# Patient Record
Sex: FEMALE | Race: WHITE | NOT HISPANIC OR LATINO | Employment: OTHER | URBAN - METROPOLITAN AREA
[De-identification: names, ages, dates, MRNs, and addresses within clinical notes are randomized per-mention and may not be internally consistent; named-entity substitution may affect disease eponyms.]

---

## 2017-04-25 ENCOUNTER — TRANSFERRED RECORDS (OUTPATIENT)
Dept: FAMILY MEDICINE | Facility: CLINIC | Age: 77
End: 2017-04-25

## 2017-06-21 ENCOUNTER — OFFICE VISIT (OUTPATIENT)
Dept: FAMILY MEDICINE | Facility: CLINIC | Age: 77
End: 2017-06-21

## 2017-06-21 VITALS
BODY MASS INDEX: 31.74 KG/M2 | RESPIRATION RATE: 20 BRPM | HEIGHT: 58 IN | WEIGHT: 151.2 LBS | TEMPERATURE: 97.6 F | HEART RATE: 60 BPM | SYSTOLIC BLOOD PRESSURE: 148 MMHG | DIASTOLIC BLOOD PRESSURE: 92 MMHG

## 2017-06-21 DIAGNOSIS — I10 ESSENTIAL HYPERTENSION, BENIGN: ICD-10-CM

## 2017-06-21 DIAGNOSIS — E78.2 MIXED HYPERLIPIDEMIA: ICD-10-CM

## 2017-06-21 DIAGNOSIS — R73.03 PRE-DIABETES: ICD-10-CM

## 2017-06-21 DIAGNOSIS — Z79.899 ENCOUNTER FOR LONG-TERM (CURRENT) USE OF MEDICATIONS: Primary | ICD-10-CM

## 2017-06-21 DIAGNOSIS — I49.1 PREMATURE ATRIAL BEATS: ICD-10-CM

## 2017-06-21 LAB — HBA1C MFR BLD: 6.7 % (ref 4–7)

## 2017-06-21 PROCEDURE — 99213 OFFICE O/P EST LOW 20 MIN: CPT | Performed by: PHYSICIAN ASSISTANT

## 2017-06-21 PROCEDURE — 83036 HEMOGLOBIN GLYCOSYLATED A1C: CPT | Performed by: PHYSICIAN ASSISTANT

## 2017-06-21 PROCEDURE — 36415 COLL VENOUS BLD VENIPUNCTURE: CPT | Performed by: PHYSICIAN ASSISTANT

## 2017-06-21 RX ORDER — ENALAPRIL MALEATE 10 MG/1
10 TABLET ORAL DAILY
Qty: 90 TABLET | Refills: 3 | Status: SHIPPED | OUTPATIENT
Start: 2017-06-21 | End: 2018-06-21

## 2017-06-21 RX ORDER — METOPROLOL TARTRATE 25 MG/1
12 TABLET, FILM COATED ORAL 2 TIMES DAILY
Qty: 90 TABLET | Refills: 3 | Status: SHIPPED | OUTPATIENT
Start: 2017-06-21 | End: 2018-06-21

## 2017-06-21 RX ORDER — HYDROCHLOROTHIAZIDE 25 MG/1
12.5 TABLET ORAL DAILY
Qty: 45 TABLET | Refills: 3 | Status: SHIPPED | OUTPATIENT
Start: 2017-06-21 | End: 2018-06-21

## 2017-06-21 NOTE — NURSING NOTE
Mikayla Sotomayor is here for a blood pressure check and medication refill.  Questioned patient about current smoking habits.  Pt. has never smoked.  Body mass index is 33.67 kg/(m^2).  PULSE regular  My Chart:     Pre-visit planning  Immunizations - up to date  Colonoscopy - declines  Mammogram - is up to date  Asthma -   PHQ9 -    JACKIE-7 -

## 2017-06-21 NOTE — PROGRESS NOTES
"CC: Medication check    History:  BENIGN HYPERTENSION  Mikayla takes BP 4-5 times/week at home. Getting 120s/60-70s.  As high as 130s, but never 140s.   Takes enalapril and HCTZ for her blood pressure. Has stopped walking as it becomes pain. No side effects to medication.     No chest pain, palpitations, shortness of breath. Occasional swelling in legs at night, but resolves by morning.      Is extremely careful with salt, and has a good balanced diet.       Premature atrial beats  Takes metoprolol for premature atrial beats. Is not having any symptoms of the premature beats like she used to.    Prediabetes  A1c last checked 4/2016 and was 6.1%. Not taking any medications.    PMH, MEDICATIONS, ALLERGIES, SOCIAL AND FAMILY HISTORY in [facility] and reviewed by me personally.      ROS negative other than the symptoms noted above in the HPI.        Examination   BP (!) 148/92 (BP Location: Left arm, Patient Position: Chair, Cuff Size: Adult Regular)  Pulse 60  Temp 97.6  F (36.4  C) (Oral)  Resp 20  Ht 1.48 m (4' 10.25\")  Wt 68.6 kg (151 lb 3.2 oz)  BMI 31.33 kg/m2       Constitutional: Sitting comfortably, in no acute distress. Vital signs noted. BP mildly elevated.   Eyes: pupils equal round reactive to light and accomodation, extra ocular movements intact  Cardiovascular:  regular rate and rhythm, no murmurs, clicks, or gallops  Respiratory:  normal respiratory rate and rhythm, lungs clear to auscultation  SKIN: No jaundice/pallor/rash.   Psychiatric: mentation appears normal and affect normal/bright        A/P    ICD-10-CM    1. Encounter for long-term (current) use of medications Z79.899    2. BENIGN HYPERTENSION I10 hydrochlorothiazide (HYDRODIURIL) 25 MG tablet     enalapril (VASOTEC) 10 MG tablet     BASIC METABOLIC PANEL (QUEST)   3. Premature atrial beats I49.1 metoprolol (LOPRESSOR) 25 MG tablet     VENOUS COLLECTION   4. Mixed hyperlipidemia E78.2 VENOUS COLLECTION     Lipid Profile (QUEST)   5. Pre-diabetes " R73.03 Hemoglobin A1c (BFP)       DISCUSSION: Mikayla seems to have white coat hypertension as all her home readings are much lower, she will bring cuff to next appt. Will refill medications and check labs today. I will send her a letter with results and any further recommendations.     follow up visit: 1 year, unless lab results indicate sooner     Lizbeth To PA-C  [facility]

## 2017-06-21 NOTE — MR AVS SNAPSHOT
"              After Visit Summary   6/21/2017    Mikayla Sotomayor    MRN: [id number]           Patient Information     Date Of Birth          1940        Visit Information        Provider Department      6/21/2017 10:00 AM Lizbeth To PA-C [facility]        Today's Diagnoses     Encounter for long-term (current) use of medications    -  1    BENIGN HYPERTENSION        Premature atrial beats        Mixed hyperlipidemia        Pre-diabetes           Follow-ups after your visit        Follow-up notes from your care team     Return in about 1 year (around 6/21/2018) for BP Recheck, Lab Work, Routine Visit.      Who to contact     If you have questions or need follow up information about today's clinic visit or your schedule please contact [facility] directly at [phone].  Normal or non-critical lab and imaging results will be communicated to you by Teevoxhart, letter or phone within 4 business days after the clinic has received the results. If you do not hear from us within 7 days, please contact the clinic through Teevoxhart or phone. If you have a critical or abnormal lab result, we will notify you by phone as soon as possible.  Submit refill requests through AppCast or call your pharmacy and they will forward the refill request to us. Please allow 3 business days for your refill to be completed.          Additional Information About Your Visit        Teevoxhart Information     AppCast lets you send messages to your doctor, view your test results, renew your prescriptions, schedule appointments and more. To sign up, go to [email or web address] . Click on \"Log in\" on the left side of the screen, which will take you to the Welcome page. Then click on \"Sign up Now\" on the right side of the page.     You will be asked to enter the access code listed below, as well as some personal information. Please follow the directions to create your username and " "password.     Your access code is: [email or web address]  Expires: 2017  1:25 PM     Your access code will  in 90 days. If you need help or a new code, please call your [facility] clinic or [phone].        Care EveryWhere ID     This is your Care EveryWhere ID. This could be used by other organizations to access your [facility] medical records  [id number]        Your Vitals Were     Pulse Temperature Respirations Height BMI (Body Mass Index)       60 97.6  F (36.4  C) (Oral) 20 1.48 m (4' 10.25\") 31.33 kg/m2        Blood Pressure from Last 3 Encounters:   17 (!) 148/92   16 150/70   16 156/78    Weight from Last 3 Encounters:   17 68.6 kg (151 lb 3.2 oz)   16 67.7 kg (149 lb 3.2 oz)   16 65.8 kg (145 lb)              We Performed the Following     BASIC METABOLIC PANEL (QUEST)     Hemoglobin A1c (BFP)     Lipid Profile (QUEST)     VENOUS COLLECTION          Where to get your medicines      These medications were sent to [facility] [address] [place] [address] [address]  AT [address] [facility] [address]     Phone:  [phone]     enalapril 10 MG tablet    hydrochlorothiazide 25 MG tablet    metoprolol 25 MG tablet          Primary Care Provider Office Phone # Fax #    Lizbeth To PA-C [phone]       [facility] [address] [facility] [address]  [facility] [address]        Equal Access to Services     LARA MARIE AH: Hadii yair tongo Sokeeley, waaxda luqadaha, qaybta kaalmada adeemilyyada, sary escobar. So [facility] [phone].    ATENCIÓN: Si habla español, tiene a koenig disposición servicios gratuitos de asistencia lingüística. Llame al [phone].    We comply with applicable federal civil rights laws and [place] laws. We do not discriminate on the basis of race, color, national origin, age, disability sex, sexual orientation or gender identity.       "      Thank you!     Thank you for choosing [facility]  for your care. Our goal is always to provide you with excellent care. Hearing back from our patients is one way we can continue to improve our services. Please take a few minutes to complete the written survey that you may receive in the mail after your visit with us. Thank you!             Your Updated Medication List - Protect others around you: Learn how to safely use, store and throw away your medicines at www.disposemymeds.org.          This list is accurate as of: 6/21/17  1:25 PM.  Always use your most recent med list.                   Brand Name Dispense Instructions for use Diagnosis    aspirin 81 MG tablet     100    1 tab po QD (Once per day)        CALCIUM MAGNESIUM PO      Take 2 oz by mouth daily.        enalapril 10 MG tablet    VASOTEC    90 tablet    Take 1 tablet (10 mg) by mouth daily    Essential hypertension, benign       FLAXSEED (LINSEED) PO      Take 1 tablet by mouth 2 times daily.        hydrochlorothiazide 25 MG tablet    HYDRODIURIL    45 tablet    Take 0.5 tablets (12.5 mg) by mouth daily    Essential hypertension, benign       metoprolol 25 MG tablet    LOPRESSOR    90 tablet    Take 0.5 tablets (12.5 mg) by mouth 2 times daily    Premature atrial beats       MULTIVITAMIN & MINERAL Liqd      Take 2 oz by mouth daily.        VITAMIN D (CHOLECALCIFEROL) PO      Take 5 drops by mouth daily.

## 2017-06-22 LAB
BUN SERPL-MCNC: 21 MG/DL (ref 7–25)
BUN/CREATININE RATIO: 17 (CALC) (ref 6–22)
CALCIUM SERPL-MCNC: 9.9 MG/DL (ref 8.6–10.4)
CHLORIDE SERPLBLD-SCNC: 100 MMOL/L (ref 98–110)
CHOLEST SERPL-MCNC: 246 MG/DL (ref 125–200)
CHOLEST/HDLC SERPL: 4.4 (CALC)
CO2 SERPL-SCNC: 24 MMOL/L (ref 20–31)
CREAT SERPL-MCNC: 1.24 MG/DL (ref 0.6–0.93)
EGFR AFRICAN AMERICAN - QUEST: 48 ML/MIN/1.73M2
GFR SERPL CREATININE-BSD FRML MDRD: 42 ML/MIN/1.73M2
GLUCOSE - QUEST: 127 MG/DL (ref 65–99)
HDLC SERPL-MCNC: 56 MG/DL
LDLC SERPL CALC-MCNC: 159 MG/DL (CALC)
NONHDLC SERPL-MCNC: 190 MG/DL (CALC)
POTASSIUM SERPL-SCNC: 4.6 MMOL/L (ref 3.5–5.3)
SODIUM SERPL-SCNC: 138 MMOL/L (ref 135–146)
TRIGL SERPL-MCNC: 155 MG/DL

## 2017-06-23 PROBLEM — E11.9 DIABETES MELLITUS, TYPE 2 (H): Status: ACTIVE | Noted: 2017-06-23

## 2017-06-23 PROBLEM — R73.03 PRE-DIABETES: Status: RESOLVED | Noted: 2017-06-21 | Resolved: 2017-06-23

## 2018-06-21 ENCOUNTER — OFFICE VISIT (OUTPATIENT)
Dept: FAMILY MEDICINE | Facility: CLINIC | Age: 78
End: 2018-06-21

## 2018-06-21 VITALS
DIASTOLIC BLOOD PRESSURE: 73 MMHG | RESPIRATION RATE: 20 BRPM | HEIGHT: 58 IN | TEMPERATURE: 97.7 F | HEART RATE: 57 BPM | BODY MASS INDEX: 31.91 KG/M2 | WEIGHT: 152 LBS | SYSTOLIC BLOOD PRESSURE: 184 MMHG

## 2018-06-21 DIAGNOSIS — G62.89 OTHER POLYNEUROPATHY: ICD-10-CM

## 2018-06-21 DIAGNOSIS — I34.0 MITRAL VALVE INSUFFICIENCY, UNSPECIFIED ETIOLOGY: ICD-10-CM

## 2018-06-21 DIAGNOSIS — E11.9 TYPE 2 DIABETES MELLITUS WITHOUT COMPLICATION, WITHOUT LONG-TERM CURRENT USE OF INSULIN (H): Primary | ICD-10-CM

## 2018-06-21 DIAGNOSIS — I49.1 PREMATURE ATRIAL BEATS: ICD-10-CM

## 2018-06-21 DIAGNOSIS — I10 ESSENTIAL HYPERTENSION, BENIGN: ICD-10-CM

## 2018-06-21 DIAGNOSIS — E78.2 MIXED HYPERLIPIDEMIA: ICD-10-CM

## 2018-06-21 LAB — HBA1C MFR BLD: 6.8 % (ref 4–7)

## 2018-06-21 PROCEDURE — 99213 OFFICE O/P EST LOW 20 MIN: CPT | Performed by: PHYSICIAN ASSISTANT

## 2018-06-21 PROCEDURE — 83036 HEMOGLOBIN GLYCOSYLATED A1C: CPT | Performed by: PHYSICIAN ASSISTANT

## 2018-06-21 PROCEDURE — 36415 COLL VENOUS BLD VENIPUNCTURE: CPT | Performed by: PHYSICIAN ASSISTANT

## 2018-06-21 RX ORDER — ENALAPRIL MALEATE 20 MG/1
20 TABLET ORAL DAILY
Qty: 90 TABLET | Refills: 0 | Status: SHIPPED | OUTPATIENT
Start: 2018-06-21 | End: 2018-10-01

## 2018-06-21 RX ORDER — ENALAPRIL MALEATE 10 MG/1
10 TABLET ORAL DAILY
Qty: 90 TABLET | Refills: 1 | Status: SHIPPED | OUTPATIENT
Start: 2018-06-21 | End: 2018-06-21 | Stop reason: DRUGHIGH

## 2018-06-21 RX ORDER — METOPROLOL TARTRATE 25 MG/1
12 TABLET, FILM COATED ORAL 2 TIMES DAILY
Qty: 90 TABLET | Refills: 1 | Status: SHIPPED | OUTPATIENT
Start: 2018-06-21 | End: 2018-10-01

## 2018-06-21 RX ORDER — HYDROCHLOROTHIAZIDE 25 MG/1
12.5 TABLET ORAL DAILY
Qty: 45 TABLET | Refills: 1 | Status: SHIPPED | OUTPATIENT
Start: 2018-06-21 | End: 2018-10-01

## 2018-06-21 NOTE — MR AVS SNAPSHOT
After Visit Summary   6/21/2018    Mikayla Sotomayor    MRN: [id number]           Patient Information     Date Of Birth          1940        Visit Information        Provider Department      6/21/2018 10:00 AM Lizbeth To PA-C [facility]        Today's Diagnoses     Type 2 diabetes mellitus without complication, without long-term current use of insulin (H)    -  1    BENIGN HYPERTENSION        Premature atrial beats        Other polyneuropathy        Mixed hyperlipidemia        Mitral valve insufficiency, unspecified etiology           Follow-ups after your visit        Follow-up notes from your care team     Return in about 3 months (around 9/21/2018).      Future tests that were ordered for you today     Open Future Orders        Priority Expected Expires Ordered    Echocardiogram Routine  6/21/2019 6/21/2018            Who to contact     If you have questions or need follow up information about today's clinic visit or your schedule please contact [facility] directly at [phone].  Normal or non-critical lab and imaging results will be communicated to you by MyChart, letter or phone within 4 business days after the clinic has received the results. If you do not hear from us within 7 days, please contact the clinic through Ocean Lithotripsyhart or phone. If you have a critical or abnormal lab result, we will notify you by phone as soon as possible.  Submit refill requests through iTherX or call your pharmacy and they will forward the refill request to us. Please allow 3 business days for your refill to be completed.          Additional Information About Your Visit        Care EveryWhere ID     This is your Care EveryWhere ID. This could be used by other organizations to access your [place] [facility] medical records  [id number]        Your Vitals Were     Pulse Temperature Respirations Height BMI (Body Mass Index)       57 97.7  F (36.5  C) (Oral) 20 1.48  "m (4' 10.25\") 31.5 kg/m2        Blood Pressure from Last 3 Encounters:   06/21/18 184/73   06/21/17 (!) 148/92   12/16/16 150/70    Weight from Last 3 Encounters:   06/21/18 68.9 kg (152 lb)   06/21/17 68.6 kg (151 lb 3.2 oz)   12/16/16 67.7 kg (149 lb 3.2 oz)              We Performed the Following     BASIC METABOLIC PANEL (QUEST)     Hemoglobin A1c (BFP)     Lipid Profile (QUEST)     VENOUS COLLECTION     VITAMIN B12 (QUEST)          Today's Medication Changes          These changes are accurate as of 6/21/18  4:02 PM.  If you have any questions, ask your nurse or doctor.               These medicines have changed or have updated prescriptions.        Dose/Directions    enalapril 20 MG tablet   Commonly known as:  VASOTEC   This may have changed:    - medication strength  - how much to take   Used for:  Essential hypertension, benign   Changed by:  Lizbeth To PA-C        Dose:  20 mg   Take 1 tablet (20 mg) by mouth daily   Quantity:  90 tablet   Refills:  0            Where to get your medicines      These medications [facility] to [facility] [address] [place] [address] [address] AT [address] [facility] [address]     Phone:  [phone]     enalapril 20 MG tablet    hydrochlorothiazide 25 MG tablet    metoprolol tartrate 25 MG tablet                Primary Care Provider Office Phone # Fax #    Lizbeth To PA-C [phone] [address] [address]        Equal Access to Services     [facility] AH: Hadii yair radford Sokeeley, waaxda luqadaha, qaybta kaalmada dana, sary escobar. So [facility] [phone].    ATENCIÓN: Si habla español, tiene a koenig disposición servicios gratuitos de asistencia lingüística. Inez al [phone].    We comply with applicable federal civil rights laws and [place] laws. We do not discriminate on the basis of race, color, " national origin, age, disability, sex, sexual orientation, or gender identity.            Thank you!     Thank you for choosing [facility]  for your care. Our goal is always to provide you with excellent care. Hearing back from our patients is one way we can continue to improve our services. Please take a few minutes to complete the written survey that you may receive in the mail after your visit with us. Thank you!             Your Updated Medication List - Protect others around you: Learn how to safely use, store and throw away your medicines at www.disposemymeds.org.          This list is accurate as of 6/21/18  4:02 PM.  Always use your most recent med list.                   Brand Name Dispense Instructions for use Diagnosis    aspirin 81 MG tablet     100    1 tab po QD (Once per day)        B-12 1000 MCG Tbcr     100 tablet    Take 1,000 mcg by mouth daily        CALCIUM MAGNESIUM PO      Take 2 oz by mouth daily.        enalapril 20 MG tablet    VASOTEC    90 tablet    Take 1 tablet (20 mg) by mouth daily    Essential hypertension, benign       FLAXSEED (LINSEED) PO      Take 1 tablet by mouth 2 times daily.        hydrochlorothiazide 25 MG tablet    HYDRODIURIL    45 tablet    Take 0.5 tablets (12.5 mg) by mouth daily    Essential hypertension, benign       metoprolol tartrate 25 MG tablet    LOPRESSOR    90 tablet    Take 0.5 tablets (12.5 mg) by mouth 2 times daily    Premature atrial beats       MULTIVITAMIN & MINERAL Liqd      Take 2 oz by mouth daily.        VITAMIN D (CHOLECALCIFEROL) PO      Take 5 drops by mouth daily.

## 2018-06-21 NOTE — PROGRESS NOTES
"CC: Medication Check    History:  Mikayla is here to refill her BP medication. She takes BP every day at home. Getting 120s/70s.  As high as 130s occasionally, but never 140s.      Takes metoprolol for premature atrial beats. Takes enalapril and HCTZ for her blood pressure.     No chest pain, palpitations, shortness of breath. Occasional swelling in legs at night, but resolves by morning.      Is extremely careful with salt, and has a good balanced diet.     PMH, MEDICATIONS, ALLERGIES, SOCIAL AND FAMILY HISTORY in [facility] and reviewed by me personally.      ROS negative other than the symptoms noted above in the HPI.        Examination   /73  Pulse 57  Temp 97.7  F (36.5  C) (Oral)  Resp 20  Ht 1.48 m (4' 10.25\")  Wt 68.9 kg (152 lb)  BMI 31.5 kg/m2       Constitutional: Sitting comfortably, in no acute distress. Vital signs noted  Eyes: pupils equal round reactive to light and accomodation, extra ocular movements intact  Neck:  no adenopathy, trachea midline and normal to palpation  Cardiovascular:  regular rate and rhythm, no murmurs, clicks, or gallops  Respiratory:  normal respiratory rate and rhythm, lungs clear to auscultation  SKIN: No jaundice/pallor/rash.   Psychiatric: mentation appears normal and affect normal/bright        A/P    ICD-10-CM    1. Type 2 diabetes mellitus without complication, without long-term current use of insulin (H) E11.9 VENOUS COLLECTION     Hemoglobin A1c (BFP)   2. BENIGN HYPERTENSION I10 hydrochlorothiazide (HYDRODIURIL) 25 MG tablet     enalapril (VASOTEC) 20 MG tablet     DISCONTINUED: enalapril (VASOTEC) 10 MG tablet   3. Premature atrial beats I49.1 metoprolol tartrate (LOPRESSOR) 25 MG tablet   4. Other polyneuropathy G62.89 BASIC METABOLIC PANEL (QUEST)     VITAMIN B12 (QUEST)   5. Mixed hyperlipidemia E78.2 Lipid Profile (QUEST)   6. Mitral valve insufficiency, unspecified etiology I34.0 Echocardiogram       DISCUSSION:  Mikayla's BP was elevated on 7 minute omron. " Recommended increase to 20 mg daily of enalapril with other medications staying the same. Did order echocardiogram to recheck MR she was last checked for in 2013. May need to follow-up with cardiology based on results.I will check other labs today, and contact her with letter when available.     A1c today was stable at 6.8% since last checked 6/2017 and was 6.7%. She can continue without medication for diabetes, but should continue to use caution with diet, and be as active as possible.     follow up visit: 1-2 months, to ensure lower BP.     Lizbeth To PA-C  [facility]

## 2018-06-21 NOTE — NURSING NOTE
Mikayla Sotomayor is here for a blood pressure check and medication refill.  Questioned patient about current smoking habits.  Pt. has never smoked.  Body mass index is 33.67 kg/(m^2).  PULSE regular  My Chart: declines    Pre-visit planning  Immunizations - up to date, declines Tdap  Colonoscopy -   Mammogram - is due and to be scheduled by patient for later completion  Asthma -   PHQ9 -    JACKIE-7 -

## 2018-06-22 LAB
BUN SERPL-MCNC: 25 MG/DL (ref 7–25)
BUN/CREATININE RATIO: 20 (CALC) (ref 6–22)
CALCIUM SERPL-MCNC: 9.6 MG/DL (ref 8.6–10.4)
CHLORIDE SERPLBLD-SCNC: 100 MMOL/L (ref 98–110)
CHOLEST SERPL-MCNC: 220 MG/DL
CHOLEST/HDLC SERPL: 4.7 (CALC)
CO2 SERPL-SCNC: 25 MMOL/L (ref 20–31)
CREAT SERPL-MCNC: 1.24 MG/DL (ref 0.6–0.93)
EGFR AFRICAN AMERICAN - QUEST: 48 ML/MIN/1.73M2
GFR SERPL CREATININE-BSD FRML MDRD: 41 ML/MIN/1.73M2
GLUCOSE - QUEST: 124 MG/DL (ref 65–99)
HDLC SERPL-MCNC: 47 MG/DL
LDLC SERPL CALC-MCNC: 143 MG/DL (CALC)
NONHDLC SERPL-MCNC: 173 MG/DL (CALC)
POTASSIUM SERPL-SCNC: 4.3 MMOL/L (ref 3.5–5.3)
SODIUM SERPL-SCNC: 137 MMOL/L (ref 135–146)
TRIGL SERPL-MCNC: 167 MG/DL
VIT B12 SERPL-MCNC: 233 PG/ML (ref 200–1100)

## 2018-06-29 ENCOUNTER — HEALTH MAINTENANCE LETTER (OUTPATIENT)
Age: 78
End: 2018-06-29

## 2018-07-24 ENCOUNTER — HOSPITAL ENCOUNTER (OUTPATIENT)
Dept: CARDIOLOGY | Facility: CLINIC | Age: 78
Discharge: HOME | End: 2018-07-24
Attending: PHYSICIAN ASSISTANT | Admitting: PHYSICIAN ASSISTANT
Payer: MEDICARE

## 2018-07-24 DIAGNOSIS — I34.0 MITRAL VALVE INSUFFICIENCY, UNSPECIFIED ETIOLOGY: ICD-10-CM

## 2018-07-24 PROCEDURE — 93306 TTE W/DOPPLER COMPLETE: CPT | Mod: 26 | Performed by: INTERNAL MEDICINE

## 2018-07-24 PROCEDURE — 93306 TTE W/DOPPLER COMPLETE: CPT

## 2018-10-01 ENCOUNTER — OFFICE VISIT (OUTPATIENT)
Dept: FAMILY MEDICINE | Facility: CLINIC | Age: 78
End: 2018-10-01

## 2018-10-01 VITALS
TEMPERATURE: 98 F | HEART RATE: 58 BPM | DIASTOLIC BLOOD PRESSURE: 80 MMHG | WEIGHT: 150 LBS | BODY MASS INDEX: 31.08 KG/M2 | OXYGEN SATURATION: 98 % | SYSTOLIC BLOOD PRESSURE: 164 MMHG

## 2018-10-01 DIAGNOSIS — I49.1 PREMATURE ATRIAL BEATS: ICD-10-CM

## 2018-10-01 DIAGNOSIS — I10 ESSENTIAL HYPERTENSION, BENIGN: ICD-10-CM

## 2018-10-01 LAB
ERYTHROCYTE [DISTWIDTH] IN BLOOD BY AUTOMATED COUNT: 14.1 %
HCT VFR BLD AUTO: 40.9 % (ref 35–47)
HEMOGLOBIN: 13.6 G/DL (ref 11.7–15.7)
MCH RBC QN AUTO: 29.6 PG (ref 26–33)
MCHC RBC AUTO-ENTMCNC: 33.3 G/DL (ref 31–36)
MCV RBC AUTO: 88.9 FL (ref 78–100)
PLATELET COUNT - QUEST: 257 10^9/L (ref 150–375)
RBC # BLD AUTO: 4.6 10*12/L (ref 3.8–5.2)
WBC # BLD AUTO: 7.5 10*9/L (ref 4–11)

## 2018-10-01 PROCEDURE — 99213 OFFICE O/P EST LOW 20 MIN: CPT | Performed by: PHYSICIAN ASSISTANT

## 2018-10-01 PROCEDURE — 85027 COMPLETE CBC AUTOMATED: CPT | Performed by: PHYSICIAN ASSISTANT

## 2018-10-01 PROCEDURE — 36415 COLL VENOUS BLD VENIPUNCTURE: CPT | Performed by: PHYSICIAN ASSISTANT

## 2018-10-01 RX ORDER — HYDROCHLOROTHIAZIDE 25 MG/1
12.5 TABLET ORAL DAILY
Qty: 45 TABLET | Refills: 1 | Status: SHIPPED | OUTPATIENT
Start: 2018-10-01 | End: 2019-01-07

## 2018-10-01 RX ORDER — METOPROLOL TARTRATE 25 MG/1
12 TABLET, FILM COATED ORAL 2 TIMES DAILY
Qty: 90 TABLET | Refills: 1 | Status: SHIPPED | OUTPATIENT
Start: 2018-10-01 | End: 2019-01-07

## 2018-10-01 RX ORDER — ENALAPRIL MALEATE 20 MG/1
20 TABLET ORAL DAILY
Qty: 90 TABLET | Refills: 0 | Status: SHIPPED | OUTPATIENT
Start: 2018-10-01 | End: 2019-01-07

## 2018-10-01 RX ORDER — AMLODIPINE BESYLATE 5 MG/1
5 TABLET ORAL DAILY
Qty: 90 TABLET | Refills: 0 | Status: SHIPPED | OUTPATIENT
Start: 2018-10-01 | End: 2018-12-13

## 2018-10-01 NOTE — NURSING NOTE
Mikayla is here for bp med check        Pre-visit Screening:  Immunizations:  not up to date - needs Tdap and Pneumonia  Colonoscopy:  NA  Mammogram: pt. States is scheduled  Asthma Action Test/Plan:  NA  PHQ9:  none  GAD7:  none  Questioned patient about current smoking habits Pt. has never smoked.  Ok to leave detailed message on voice mail for today's visit only Yes, phone # [phone]

## 2018-10-01 NOTE — PATIENT INSTRUCTIONS
BP is still elevated today.  Continue on same 3 blood pressure medications.  Start new medication called amlodipine. Take 1/2 tablet daily for 1 week, then if tolerating without side effects (fatigue, dizziness swelling in ankle) increase to 1 full tablet daily. Okay to take in AM or PM   Check BP 2-3 times weekly and record. Bring readings with you to next appointment. Consider buying Omron cuff, and bring with you to next appt.   Return 3 months fasting or sooner with concerns.

## 2018-10-01 NOTE — MR AVS SNAPSHOT
After Visit Summary   10/1/2018    Mikayla Sotomayor    MRN: [id number]           Patient Information     Date Of Birth          1940        Visit Information        Provider Department      10/1/2018 10:00 AM Lizbeth To PA-C [facility]        Today's Diagnoses     Premature atrial beats        BENIGN HYPERTENSION          Care Instructions    BP is still elevated today.  Continue on same 3 blood pressure medications.  Start new medication called amlodipine. Take 1/2 tablet daily for 1 week, then if tolerating without side effects (fatigue, dizziness swelling in ankle) increase to 1 full tablet daily. Okay to take in AM or PM   Check BP 2-3 times weekly and record. Bring readings with you to next appointment. Consider buying Omron cuff, and bring with you to next appt.   Return 3 months fasting or sooner with concerns.            Follow-ups after your visit        Who to contact     If you have questions or need follow up information about today's clinic visit or your schedule please contact [facility] directly at [phone].  Normal or non-critical lab and imaging results will be communicated to you by MyChart, letter or phone within 4 business days after the clinic has received the results. If you do not hear from us within 7 days, please contact the clinic through MyChart or phone. If you have a critical or abnormal lab result, we will notify you by phone as soon as possible.  Submit refill requests through NaturVention or call your pharmacy and they will forward the refill request to us. Please allow 3 business days for your refill to be completed.          Additional Information About Your Visit        Care EveryWhere ID     This is your Care EveryWhere ID. This could be used by other organizations to access your [place] medical records  [id number]        Your Vitals Were     Pulse Temperature Pulse Oximetry BMI (Body Mass Index)           58 98  F (36.7  C) (Oral) 98% 31.08 kg/m2         Blood Pressure from Last 3 Encounters:   10/01/18 164/80   06/21/18 184/73   06/21/17 (!) 148/92    Weight from Last 3 Encounters:   10/01/18 68 kg (150 lb)   06/21/18 68.9 kg (152 lb)   06/21/17 68.6 kg (151 lb 3.2 oz)              We Performed the Following     BASIC METABOLIC PANEL (QUEST)     HEMOGRAM/PLATELET (BFP)     VENOUS COLLECTION          Today's Medication Changes          These changes are accurate as of 10/1/18 10:40 AM.  If you have any questions, ask your nurse or doctor.               Start taking these medicines.        Dose/Directions    amLODIPine 5 MG tablet   Commonly known as:  NORVASC   Used for:  Essential hypertension, benign   Started by:  Lizbeth To PA-C        Dose:  5 mg   Take 1 tablet (5 mg) by mouth daily   Quantity:  90 tablet   Refills:  0            Where to get your medicines      These medications were sent to [facility] [address] [place] [address] [address]  AT [address] [address]     Phone:  [phone]     amLODIPine 5 MG tablet    enalapril 20 MG tablet    hydrochlorothiazide 25 MG tablet    metoprolol tartrate 25 MG tablet                Primary Care Provider Office Phone # Fax #    Lizbeth To PA-C [phone] [address]  [place] [address]        Equal Access to Services     [facility] AH: Hadii aad ku hadasho Soomaali, waaxda luqadaha, qaybta kaalmada adeegyada, waxay michael escobar. So [facility] [phone].    ATENCIÓN: Si habla español, tiene a koenig disposición servicios gratuitos de asistencia lingüística. Llame al [phone].    We comply with applicable federal civil rights laws and [place] laws. We do not discriminate on the basis of race, color, national origin, age, disability, sex, sexual orientation, or gender identity.            Thank you!     Thank you  for choosing [facility]  for your care. Our goal is always to provide you with excellent care. Hearing back from our patients is one way we can continue to improve our services. Please take a few minutes to complete the written survey that you may receive in the mail after your visit with us. Thank you!             Your Updated Medication List - Protect others around you: Learn how to safely use, store and throw away your medicines at www.disposemymeds.org.          This list is accurate as of 10/1/18 10:40 AM.  Always use your most recent med list.                   Brand Name Dispense Instructions for use Diagnosis    amLODIPine 5 MG tablet    NORVASC    90 tablet    Take 1 tablet (5 mg) by mouth daily    Essential hypertension, benign       aspirin 81 MG tablet     100    1 tab po QD (Once per day)        B-12 1000 MCG Tbcr     100 tablet    Take 1,000 mcg by mouth daily        CALCIUM MAGNESIUM PO      Take 2 oz by mouth daily.        enalapril 20 MG tablet    VASOTEC    90 tablet    Take 1 tablet (20 mg) by mouth daily    Essential hypertension, benign       FLAXSEED (LINSEED) PO      Take 1 tablet by mouth 2 times daily.        hydrochlorothiazide 25 MG tablet    HYDRODIURIL    45 tablet    Take 0.5 tablets (12.5 mg) by mouth daily    Essential hypertension, benign       metoprolol tartrate 25 MG tablet    LOPRESSOR    90 tablet    Take 0.5 tablets (12.5 mg) by mouth 2 times daily    Premature atrial beats       MULTIVITAMIN & MINERAL Liqd      Take 2 oz by mouth daily.        VITAMIN D (CHOLECALCIFEROL) PO      Take 5 drops by mouth daily.

## 2018-10-01 NOTE — PROGRESS NOTES
CC: BP Medication Check    History:  Mikayla was here 6/21/2018 with BP elevated to 184/73 on Omron. At that appt, she was increased to enalapril 20 mg daily. Still takes hydrochlorothiazide 12.5 mg 1/2 tablet and metoprolol 25 mg 1/2 tablet twice daily as well. Checks BP at home and usually gets a wide range range 90 (one time)-130s/70s. Admits she is due to get a new home BP cuff.     No headaches, vision changes, chest pain, SOB, palpitations.     Since June appt has been more careful of diet. Still unable to exercise due to foot pain.    PMH, MEDICATIONS, ALLERGIES, SOCIAL AND FAMILY HISTORY in [facility] and reviewed by me personally.      ROS negative other than the symptoms noted above in the HPI.        Examination   /80 (BP Location: Right arm, Patient Position: Sitting, Cuff Size: Adult Large)  Pulse 58  Temp 98  F (36.7  C) (Oral)  Wt 68 kg (150 lb)  SpO2 98%  BMI 31.08 kg/m2       Constitutional: Sitting comfortably, in no acute distress. Vital signs noted. BP elevated.  Neck:  no adenopathy, trachea midline and normal to palpation  Cardiovascular:  regular rate and rhythm, no murmurs, clicks, or gallops  Respiratory:  normal respiratory rate and rhythm, lungs clear to auscultation  SKIN: No jaundice/pallor/rash.   Psychiatric: mentation appears normal and affect normal/bright        A/P    ICD-10-CM    1. Premature atrial beats I49.1 metoprolol tartrate (LOPRESSOR) 25 MG tablet   2. BENIGN HYPERTENSION I10 hydrochlorothiazide (HYDRODIURIL) 25 MG tablet     enalapril (VASOTEC) 20 MG tablet     amLODIPine (NORVASC) 5 MG tablet     VENOUS COLLECTION     BASIC METABOLIC PANEL (QUEST)     HEMOGRAM/PLATELET (B[facility])       DISCUSSION:  BP improved, but is still elevated today.  Continue on same 3 blood pressure medications at same doses  START new medication called amlodipine. Take 1/2 tablet daily for 1 week, then if tolerating without side effects (fatigue, dizziness swelling in ankles) increase to 1 full  tablet daily. Okay to take in AM or PM   Check BP 2-3 times weekly and record. Bring readings with you to next appointment. Consider buying Omron cuff, and bring with you to next appt.   Return 3 months FASTING or sooner with concerns. If BP still elevated at that time, will likely refer to cardiologist to ensure risks are minimized.     Again, encouraged podiatry appt, as food pain is limiting exercise.    follow up visit: 3 months    Lizbeth To PA-C  [facility]

## 2018-10-02 DIAGNOSIS — N18.30 CKD (CHRONIC KIDNEY DISEASE) STAGE 3, GFR 30-59 ML/MIN (H): ICD-10-CM

## 2018-10-02 LAB
BUN SERPL-MCNC: 26 MG/DL (ref 7–25)
BUN/CREATININE RATIO: 21 (CALC) (ref 6–22)
CALCIUM SERPL-MCNC: 10 MG/DL (ref 8.6–10.4)
CHLORIDE SERPLBLD-SCNC: 102 MMOL/L (ref 98–110)
CO2 SERPL-SCNC: 24 MMOL/L (ref 20–32)
CREAT SERPL-MCNC: 1.26 MG/DL (ref 0.6–0.88)
EGFR AFRICAN AMERICAN - QUEST: 47 ML/MIN/1.73M2
GFR SERPL CREATININE-BSD FRML MDRD: 40 ML/MIN/1.73M2
GLUCOSE - QUEST: 137 MG/DL (ref 65–99)
POTASSIUM SERPL-SCNC: 4.9 MMOL/L (ref 3.5–5.3)
SODIUM SERPL-SCNC: 138 MMOL/L (ref 135–146)

## 2018-12-13 ENCOUNTER — OFFICE VISIT (OUTPATIENT)
Dept: INTERNAL MEDICINE | Facility: CLINIC | Age: 78
End: 2018-12-13
Payer: MEDICARE

## 2018-12-13 VITALS
DIASTOLIC BLOOD PRESSURE: 84 MMHG | HEART RATE: 68 BPM | RESPIRATION RATE: 16 BRPM | HEIGHT: 59 IN | TEMPERATURE: 98 F | WEIGHT: 144.8 LBS | BODY MASS INDEX: 29.19 KG/M2 | SYSTOLIC BLOOD PRESSURE: 138 MMHG | OXYGEN SATURATION: 98 %

## 2018-12-13 DIAGNOSIS — R41.3 MEMORY LOSS: Primary | ICD-10-CM

## 2018-12-13 DIAGNOSIS — I10 ESSENTIAL HYPERTENSION, BENIGN: ICD-10-CM

## 2018-12-13 PROCEDURE — 99203 OFFICE O/P NEW LOW 30 MIN: CPT | Performed by: NURSE PRACTITIONER

## 2018-12-13 ASSESSMENT — MIFFLIN-ST. JEOR: SCORE: 1032.44

## 2018-12-13 NOTE — PROGRESS NOTES
"  SUBJECTIVE:   Mikayla Sotomayor is a 78 year old female who presents to clinic today for the following health issues:      New Patient/Transfer of Care    Pt and  believe she had a TIA 10/1/18, had an episode of trouble word finding.  No loss of speech, facial droop or drooling, muscle weakness.  Has had\"memory issues\" since then.  Cannot remember parts of her prayers,  reports she is more irritable or easily frustrated, cries more easily.  Home BPs 130s/80s    Problem list and histories reviewed & adjusted, as indicated.  Additional history: as documented    Patient Active Problem List   Diagnosis     Essential hypertension, benign     Diffuse cystic mastopathy     Symptomatic menopausal or female climacteric states     Family history of other cardiovascular diseases     Family history of diabetes mellitus     Mixed hyperlipidemia     Nonspecific abnormal electrocardiogram (ECG) (EKG)     Disorder of bone and cartilage     ACP (advance care planning)     Health Care Home     Mitral valve disorder     Premature atrial complexes     Diabetes mellitus, type 2 (H)     CKD (chronic kidney disease) stage 3, GFR 30-59 ml/min (H)     Past Surgical History:   Procedure Laterality Date     C ANESTH,SURG BREAST RECONSTRUCTIVE      L breast     C APPENDECTOMY      age 5     C C-SEC ONLY,PREV C-SEC  1965-68     C DISCISSION,2ND CATARACT,INCIS  1/2008     C EYE EXAM ESTABLISHED PT  2012     DRAINAGE OF HEMATOMA/FLUID  1980     HC COLONOSCOPY THRU STOMA, DIAGNOSTIC  1/2007     TEST NOT FOUND  March 03    Cardiology    Thal. stess test  reported to the patient as normal       Social History     Tobacco Use     Smoking status: Never Smoker     Smokeless tobacco: Never Used   Substance Use Topics     Alcohol use: Yes     Alcohol/week: 0.0 oz     Comment: rare      Family History   Problem Relation Age of Onset     Hypertension Mother      Gastrointestinal Disease Mother         diverticulosis     Diabetes Father      " "Heart Disease Father      Hypertension Sister      Diabetes Brother      Breast Cancer No family hx of      Cancer - colorectal No family hx of          Current Outpatient Medications   Medication Sig Dispense Refill     ASPIRIN 81 MG OR TABS 1 tab po QD (Once per day) 100 3     Calcium-Magnesium-Vitamin D (CALCIUM MAGNESIUM PO) Take 2 oz by mouth daily.       Cyanocobalamin (B-12) 1000 MCG TBCR Take 1,000 mcg by mouth daily 100 tablet 1     enalapril (VASOTEC) 20 MG tablet Take 1 tablet (20 mg) by mouth daily 90 tablet 0     FLAXSEED, LINSEED, PO Take 1 tablet by mouth 2 times daily.       hydrochlorothiazide (HYDRODIURIL) 25 MG tablet Take 0.5 tablets (12.5 mg) by mouth daily 45 tablet 1     metoprolol tartrate (LOPRESSOR) 25 MG tablet Take 0.5 tablets (12.5 mg) by mouth 2 times daily 90 tablet 1     Multiple Vitamins-Minerals (MULTIVITAMIN & MINERAL) LIQD Take 2 oz by mouth daily.       VITAMIN D, CHOLECALCIFEROL, PO Take 5 drops by mouth daily.       BP Readings from Last 3 Encounters:   12/13/18 138/84   10/01/18 164/80   06/21/18 184/73    Wt Readings from Last 3 Encounters:   12/13/18 65.7 kg (144 lb 12.8 oz)   10/01/18 68 kg (150 lb)   06/21/18 68.9 kg (152 lb)                    Reviewed and updated as needed this visit by clinical staff  Tobacco  Allergies  Meds  Med Hx  Surg Hx  Fam Hx  Soc Hx      Reviewed and updated as needed this visit by Provider         ROS:  CONSTITUTIONAL: NEGATIVE for fever, chills, change in weight  ENT/MOUTH: NEGATIVE for ear, mouth and throat problems  RESP: NEGATIVE for significant cough or SOB  CV: NEGATIVE for chest pain, palpitations or peripheral edema  ROS otherwise negative    OBJECTIVE:     /84 (BP Location: Right arm, Patient Position: Sitting, Cuff Size: Adult Large)   Pulse 68   Temp 98  F (36.7  C) (Oral)   Resp 16   Ht 1.499 m (4' 11\")   Wt 65.7 kg (144 lb 12.8 oz)   SpO2 98%   BMI 29.25 kg/m    Body mass index is 29.25 kg/m .  GENERAL: alert, " no distress and elderly  Normal mentation, no word finding problems, she is anxious        ASSESSMENT/PLAN:               ICD-10-CM    1. Memory loss R41.3    2. Essential hypertension, benign I10        Patient Instructions   Increase hydrochlorothiazide to a full tablet  Consider memory evaluation    Nubia Morocho, NP  [facility]

## 2019-01-07 ENCOUNTER — OFFICE VISIT (OUTPATIENT)
Dept: INTERNAL MEDICINE | Facility: CLINIC | Age: 79
End: 2019-01-07
Payer: MEDICARE

## 2019-01-07 VITALS
SYSTOLIC BLOOD PRESSURE: 122 MMHG | DIASTOLIC BLOOD PRESSURE: 78 MMHG | WEIGHT: 142 LBS | HEIGHT: 58 IN | OXYGEN SATURATION: 96 % | BODY MASS INDEX: 29.81 KG/M2 | RESPIRATION RATE: 12 BRPM | HEART RATE: 71 BPM | TEMPERATURE: 98.1 F

## 2019-01-07 DIAGNOSIS — R41.3 MEMORY PROBLEM: ICD-10-CM

## 2019-01-07 DIAGNOSIS — Z00.00 ROUTINE GENERAL MEDICAL EXAMINATION AT A HEALTH CARE FACILITY: Primary | ICD-10-CM

## 2019-01-07 DIAGNOSIS — G45.9 TIA (TRANSIENT ISCHEMIC ATTACK): ICD-10-CM

## 2019-01-07 DIAGNOSIS — I49.1 PREMATURE ATRIAL BEATS: ICD-10-CM

## 2019-01-07 DIAGNOSIS — I10 ESSENTIAL HYPERTENSION, BENIGN: ICD-10-CM

## 2019-01-07 DIAGNOSIS — E11.21 CONTROLLED TYPE 2 DIABETES MELLITUS WITH DIABETIC NEPHROPATHY, WITHOUT LONG-TERM CURRENT USE OF INSULIN (H): ICD-10-CM

## 2019-01-07 LAB
BASOPHILS # BLD AUTO: 0 10E9/L (ref 0–0.2)
BASOPHILS NFR BLD AUTO: 0.5 %
DIFFERENTIAL METHOD BLD: NORMAL
EOSINOPHIL # BLD AUTO: 0.2 10E9/L (ref 0–0.7)
EOSINOPHIL NFR BLD AUTO: 3.1 %
ERYTHROCYTE [DISTWIDTH] IN BLOOD BY AUTOMATED COUNT: 14.5 % (ref 10–15)
HBA1C MFR BLD: 6.9 % (ref 0–5.6)
HCT VFR BLD AUTO: 43.4 % (ref 35–47)
HGB BLD-MCNC: 13.7 G/DL (ref 11.7–15.7)
LYMPHOCYTES # BLD AUTO: 1.9 10E9/L (ref 0.8–5.3)
LYMPHOCYTES NFR BLD AUTO: 25 %
MCH RBC QN AUTO: 29.6 PG (ref 26.5–33)
MCHC RBC AUTO-ENTMCNC: 31.6 G/DL (ref 31.5–36.5)
MCV RBC AUTO: 94 FL (ref 78–100)
MONOCYTES # BLD AUTO: 0.8 10E9/L (ref 0–1.3)
MONOCYTES NFR BLD AUTO: 10.2 %
NEUTROPHILS # BLD AUTO: 4.7 10E9/L (ref 1.6–8.3)
NEUTROPHILS NFR BLD AUTO: 61.2 %
PLATELET # BLD AUTO: 239 10E9/L (ref 150–450)
RBC # BLD AUTO: 4.63 10E12/L (ref 3.8–5.2)
WBC # BLD AUTO: 7.8 10E9/L (ref 4–11)

## 2019-01-07 PROCEDURE — 93000 ELECTROCARDIOGRAM COMPLETE: CPT | Performed by: INTERNAL MEDICINE

## 2019-01-07 PROCEDURE — 82043 UR ALBUMIN QUANTITATIVE: CPT | Performed by: INTERNAL MEDICINE

## 2019-01-07 PROCEDURE — 85025 COMPLETE CBC W/AUTO DIFF WBC: CPT | Performed by: INTERNAL MEDICINE

## 2019-01-07 PROCEDURE — 36415 COLL VENOUS BLD VENIPUNCTURE: CPT | Performed by: INTERNAL MEDICINE

## 2019-01-07 PROCEDURE — 99214 OFFICE O/P EST MOD 30 MIN: CPT | Mod: 25 | Performed by: INTERNAL MEDICINE

## 2019-01-07 PROCEDURE — G0439 PPPS, SUBSEQ VISIT: HCPCS | Performed by: INTERNAL MEDICINE

## 2019-01-07 PROCEDURE — 84443 ASSAY THYROID STIM HORMONE: CPT | Performed by: INTERNAL MEDICINE

## 2019-01-07 PROCEDURE — 83036 HEMOGLOBIN GLYCOSYLATED A1C: CPT | Performed by: INTERNAL MEDICINE

## 2019-01-07 RX ORDER — ENALAPRIL MALEATE 20 MG/1
20 TABLET ORAL DAILY
Qty: 90 TABLET | Refills: 4 | Status: SHIPPED | OUTPATIENT
Start: 2019-01-07 | End: 2020-03-16

## 2019-01-07 RX ORDER — METOPROLOL TARTRATE 25 MG/1
12 TABLET, FILM COATED ORAL 2 TIMES DAILY
Qty: 90 TABLET | Refills: 4 | Status: SHIPPED | OUTPATIENT
Start: 2019-01-07 | End: 2020-06-12

## 2019-01-07 RX ORDER — HYDROCHLOROTHIAZIDE 25 MG/1
12.5 TABLET ORAL DAILY
Qty: 45 TABLET | Refills: 4 | Status: SHIPPED | OUTPATIENT
Start: 2019-01-07 | End: 2020-03-16

## 2019-01-07 RX ORDER — ROSUVASTATIN CALCIUM 10 MG/1
10 TABLET, COATED ORAL DAILY
Qty: 90 TABLET | Refills: 0 | Status: SHIPPED | OUTPATIENT
Start: 2019-01-07 | End: 2020-01-07

## 2019-01-07 ASSESSMENT — ENCOUNTER SYMPTOMS
SORE THROAT: 0
HEADACHES: 0
COUGH: 0
MYALGIAS: 0
PARESTHESIAS: 0
DYSURIA: 0
FEVER: 0
HEARTBURN: 0
NAUSEA: 0
DIZZINESS: 0
HEMATURIA: 0
CONSTIPATION: 0
DIARRHEA: 0
EYE PAIN: 0
PALPITATIONS: 0
HEMATOCHEZIA: 0
CHILLS: 0
JOINT SWELLING: 0
BREAST MASS: 0
NERVOUS/ANXIOUS: 0
SHORTNESS OF BREATH: 0
ARTHRALGIAS: 0
FREQUENCY: 0
ABDOMINAL PAIN: 0

## 2019-01-07 ASSESSMENT — MIFFLIN-ST. JEOR: SCORE: 1003.86

## 2019-01-07 ASSESSMENT — ACTIVITIES OF DAILY LIVING (ADL): CURRENT_FUNCTION: NO ASSISTANCE NEEDED

## 2019-01-07 NOTE — LETTER
[facility]  [address], [address]  [place] [address]  [phone]        January 10, 2019    Mikayla Sotomayor  [address] [facility] [address]            Dear Ms. Mikayla BROWN Bran:      The results of your recent blood counts are within acceptable limits.   The thyroid test (TSH) is within normal limits.   The microalbumin is within normal limits.   The hemoglobin A1c is at goal of less than 7.0%. .  If you have any further questions or problems, please contact our office.    Sincerely,    Tereza Pimentel M.D.    Results for orders placed or performed in visit on 01/07/19   Hemoglobin A1c   Result Value Ref Range    Hemoglobin A1C 6.9 (H) 0 - 5.6 %   Albumin Random Urine Quantitative with Creat Ratio   Result Value Ref Range    Creatinine Urine 120 mg/dL    Albumin Urine mg/L 15 mg/L    Albumin Urine mg/g Cr 12.75 0 - 25 mg/g Cr   CBC with platelets differential   Result Value Ref Range    WBC 7.8 4.0 - 11.0 10e9/L    RBC Count 4.63 3.8 - 5.2 10e12/L    Hemoglobin 13.7 11.7 - 15.7 g/dL    Hematocrit 43.4 35.0 - 47.0 %    MCV 94 78 - 100 fl    MCH 29.6 26.5 - 33.0 pg    MCHC 31.6 31.5 - 36.5 g/dL    RDW 14.5 10.0 - 15.0 %    Platelet Count 239 150 - 450 10e9/L    % Neutrophils 61.2 %    % Lymphocytes 25.0 %    % Monocytes 10.2 %    % Eosinophils 3.1 %    % Basophils 0.5 %    Absolute Neutrophil 4.7 1.6 - 8.3 10e9/L    Absolute Lymphocytes 1.9 0.8 - 5.3 10e9/L    Absolute Monocytes 0.8 0.0 - 1.3 10e9/L    Absolute Eosinophils 0.2 0.0 - 0.7 10e9/L    Absolute Basophils 0.0 0.0 - 0.2 10e9/L    Diff Method Automated Method    TSH with free T4 reflex   Result Value Ref Range    TSH 2.27 0.40 - 4.00 mU/L

## 2019-01-07 NOTE — PROGRESS NOTES
"SUBJECTIVE:   Mikayla Sotomayor is a 78 year old female who presents for Preventive Visit.    Are you in the first 12 months of your Medicare coverage?  No    Annual Wellness Visit     In general, how would you rate your overall health?  Good    Frequency of exercise:  None    Do you usually eat at least 4 servings of fruit and vegetables a day, include whole grains    & fiber and avoid regularly eating high fat or \"junk\" foods?  No    Taking medications regularly:  Yes    Ability to successfully perform activities of daily living:  No assistance needed    Home Safety:  No safety concerns identified    Hearing Impairment:  Feel that people are mumbling or not speaking clearly and difficulty understanding soft or whispered speech    In the past 6 months, have you been bothered by leaking of urine?  No    In general, how would you rate your overall mental or emotional health?  Good    PHQ-2 Total Score: 0    Additional concerns today:  No      Patient was unaware of her new diagnosis of diabetes in 2017.  She has not seen diabetic education.  Last hgA1c was 6.8%.    3 months ago she reports an episode of garbled speech.  This lasted approximately 10 minutes.   Since then she has not had any episodes.  The patient reports that she has had some memory loss since this event.   She has not seen neurology.   She does not take an aspirin regularly.     Of note, B12 was 233.  No TSH recently performed.     Do you feel safe in your environment? Yes    Do you have a Health Care Directive? Yes: Patient states has Advance Directive and will bring in a copy to clinic.      Cognitive Screening   1) Repeat 3 items (Leader, Season, Table)    2) Clock draw: NORMAL  3) 3 item recall: Recalls NO objects   Results: 0 items recalled: PROBABLE COGNITIVE IMPAIRMENT, **INFORM PROVIDER**    Mini-CogTM Copyright JONATHON Zaman. Licensed by the author for use in [facility]; reprinted with permission ([email or web address]). All rights reserved.  "     Do you have sleep apnea, excessive snoring or daytime drowsiness?: no    Reviewed and updated as needed this visit by clinical staff  Tobacco  Allergies  Meds  Med Hx  Surg Hx  Fam Hx  Soc Hx        Reviewed and updated as needed this visit by Provider  Meds        Social History     Tobacco Use     Smoking status: Never Smoker     Smokeless tobacco: Never Used   Substance Use Topics     Alcohol use: Yes     Alcohol/week: 0.0 oz     Comment: rare        Alcohol Use 1/7/2019   If you drink alcohol do you typically have greater than 3 drinks per day OR greater than 7 drinks per week? No     Current providers sharing in care for this patient include:   Patient Care Team:  Lizbeth To PA-C as PCP - General (Physician Assistant)  Nubia Morocho NP as PCP - Assigned PCP    The following health maintenance items are reviewed in Epic and correct as of today:  Health Maintenance   Topic Date Due     FOOT EXAM Q1 YEAR  08/24/1941     MICROALBUMIN Q1 YEAR  08/24/1941     MEDICARE ANNUAL WELLNESS VISIT  08/24/1956     DTAP/TDAP/TD IMMUNIZATION (3 - Td) 06/12/2013     TSH W/ FREE T4 REFLEX Q2 YEAR  09/06/2014     EYE EXAM Q1 YEAR  01/01/2018     A1C Q6 MO  12/21/2018     ZOSTER IMMUNIZATION (2 of 2) 02/07/2019     MAMMO Q1 YR  01/13/2019 (Originally 4/25/2018)     ADVANCE DIRECTIVE PLANNING Q5 YRS  03/05/2019     FALL RISK ASSESSMENT  06/21/2019     LIPID MONITORING Q1 YEAR  06/21/2019     CREATININE Q1 YEAR  10/01/2019     PHQ-2 Q1 YR  10/01/2019     PNEUMOVAX IMMUNIZATION 65+ LOW/MEDIUM RISK (2 of 2 - PPSV23) 12/13/2019     COLONOSCOPY Q10 YR  06/21/2027     DEXA SCAN SCREENING (SYSTEM ASSIGNED)  Completed     INFLUENZA VACCINE  Addressed     IPV IMMUNIZATION  Aged Out     MENINGITIS IMMUNIZATION  Aged Out       Review of Systems   Constitutional: Negative for chills and fever.   HENT: Positive for hearing loss. Negative for congestion, ear pain and sore throat.    Eyes: Positive for visual  "disturbance. Negative for pain.   Respiratory: Negative for cough and shortness of breath.    Cardiovascular: Negative for chest pain, palpitations and peripheral edema.   Gastrointestinal: Negative for abdominal pain, constipation, diarrhea, heartburn, hematochezia and nausea.   Breasts:  Negative for tenderness, breast mass and discharge.   Genitourinary: Negative for dysuria, frequency, genital sores, hematuria, pelvic pain, urgency, vaginal bleeding and vaginal discharge.   Musculoskeletal: Negative for arthralgias, joint swelling and myalgias.   Skin: Negative for rash.   Neurological: Negative for dizziness, headaches and paresthesias.   Psychiatric/Behavioral: Negative for mood changes. The patient is not nervous/anxious.      Memory changes    OBJECTIVE:   /78   Pulse 71   Temp 98.1  F (36.7  C) (Oral)   Resp 12   Ht 1.473 m (4' 10\")   Wt 64.4 kg (142 lb)   SpO2 96%   BMI 29.68 kg/m   Estimated body mass index is 29.68 kg/m  as calculated from the following:    Height as of this encounter: 1.473 m (4' 10\").    Weight as of this encounter: 64.4 kg (142 lb).     Physical Exam  GENERAL: healthy, alert and no distress  EYES: Eyes grossly normal to inspection, PERRL and conjunctivae and sclerae normal  HENT: ear canals and TM's normal, nose and mouth without ulcers or lesions  NECK: no adenopathy, no asymmetry, masses, or scars and thyroid normal to palpation  RESP: lungs clear to auscultation - no rales, rhonchi or wheezes  BREAST: normal without masses, tenderness or nipple discharge and no palpable axillary masses or adenopathy  CV: regular rate and rhythm, normal S1 S2, no S3 or S4, no murmur, click or rub, no peripheral edema and peripheral pulses strong  ABDOMEN: soft, nontender, no hepatosplenomegaly, no masses and bowel sounds normal  MS: no gross musculoskeletal defects noted, no edema  SKIN: no suspicious lesions or rashes  NEURO: Normal strength and tone, mentation intact and speech " "normal  PSYCH: mentation appears normal, affect normal/bright      ASSESSMENT / PLAN:       ICD-10-CM    1. Routine general medical examination at a health care facility Z00.00    2. Controlled type 2 diabetes mellitus with diabetic nephropathy, without long-term current use of insulin (H) E11.21 DIABETES EDUCATOR REFERRAL     Comprehensive metabolic panel     Hemoglobin A1c     Albumin Random Urine Quantitative with Creat Ratio     rosuvastatin (CRESTOR) 10 MG tablet   3. BENIGN HYPERTENSION I10 hydrochlorothiazide (HYDRODIURIL) 25 MG tablet   4. Premature atrial beats I49.1 metoprolol tartrate (LOPRESSOR) 25 MG tablet   5. Memory problem R41.3 CT Head w/o Contrast     EKG 12-lead complete w/read - Clinics     NEUROLOGY ADULT REFERRAL     CBC with platelets differential     TSH with free T4 reflex   6. TIA (transient ischemic attack) G45.9 US Carotid Bilateral     EKG 12-lead complete w/read - Clinics     NEUROLOGY ADULT REFERRAL     rosuvastatin (CRESTOR) 10 MG tablet       DM.  Patient now aware of her DM.    H/o TIA.  No symptoms since.  Do not see past work up for TIA.  Patient to start aspirin and statin, statin also for her diabetes.  EKG and imaging ordered.  The patient is to follow up with neurology.      COUNSELING:  Reviewed preventive health counseling, as reflected in patient instructions       Regular exercise       Healthy diet/nutrition       Vision screening       Hearing screening       Dental care    BP Readings from Last 1 Encounters:   01/07/19 122/78     Estimated body mass index is 29.68 kg/m  as calculated from the following:    Height as of this encounter: 1.473 m (4' 10\").    Weight as of this encounter: 64.4 kg (142 lb).           reports that  has never smoked. she has never used smokeless tobacco.      Appropriate preventive services were discussed with this patient, including applicable screening as appropriate for cardiovascular disease, diabetes, osteopenia/osteoporosis, and " glaucoma.  As appropriate for age/gender, discussed screening for colorectal cancer, prostate cancer, breast cancer, and cervical cancer. Checklist reviewing preventive services available has been given to the patient.    Reviewed patients plan of care and provided an AVS. The Basic Care Plan (routine screening as documented in Health Maintenance) for Mikayla meets the Care Plan requirement. This Care Plan has been established and reviewed with the Patient.    Counseling Resources:  ATP IV Guidelines  Pooled Cohorts Equation Calculator  Breast Cancer Risk Calculator  FRAX Risk Assessment  ICSI Preventive Guidelines  Dietary Guidelines for Americans, 2010  USDA's MyPlate  ASA Prophylaxis  Lung CA Screening    Tereza Pimentel MD  [facility]

## 2019-01-07 NOTE — PATIENT INSTRUCTIONS
Regarding your mini stroke, recommend further work up with brain and carotid imaging.  Also will update EKG.  Please take aspirin 81mg regularly.  See neurology.     For diabetes, please see diabetic education.   You should be on cholesterol medication with both diabetes and a mini stroke.  Need to check these labs - cholesterol and liver function in 3 months.     Your B12 was low.  Recommend taking an OTC b12 supplement.   B12 can affect your memory.

## 2019-01-07 NOTE — NURSING NOTE
"Pulse 71   Temp 98.1  F (36.7  C) (Oral)   Resp 12   Ht 1.473 m (4' 10\")   Wt 64.4 kg (142 lb)   SpO2 96%   BMI 29.68 kg/m      "

## 2019-01-08 LAB
CREAT UR-MCNC: 120 MG/DL
MICROALBUMIN UR-MCNC: 15 MG/L
MICROALBUMIN/CREAT UR: 12.75 MG/G CR (ref 0–25)
TSH SERPL DL<=0.005 MIU/L-ACNC: 2.27 MU/L (ref 0.4–4)

## 2019-02-07 ENCOUNTER — TELEPHONE (OUTPATIENT)
Dept: INTERNAL MEDICINE | Facility: CLINIC | Age: 79
End: 2019-02-07

## 2019-02-07 NOTE — TELEPHONE ENCOUNTER
Diabetes Education Scheduling Outreach #1:    Call to patient to schedule. Spouse  declined to schedule.        Sonya Allen  [facility]  Diabetes and Nutrition Scheduling

## 2020-03-11 DIAGNOSIS — I10 ESSENTIAL HYPERTENSION, BENIGN: ICD-10-CM

## 2020-03-11 NOTE — TELEPHONE ENCOUNTER
"Requested Prescriptions   Pending Prescriptions Disp Refills     hydrochlorothiazide (HYDRODIURIL) 25 MG tablet [Pharmacy Med Name: HYDROCHLOROTHIAZIDE 25MG TABLETS] 45 tablet 4     Sig: TAKE 1/2 TABLET(12.5 MG) BY MOUTH DAILY   Last Written Prescription Date:  01/07/2019  Last Fill Quantity: 45,  # refills: 04  Last office visit: 1/7/2019 with prescribing provider:     Future Office Visit:      Diuretics (Including Combos) Protocol Failed - 3/11/2020 12:34 PM        Failed - Blood pressure under 140/90 in past 12 months     BP Readings from Last 3 Encounters:   01/07/19 122/78   12/13/18 138/84   10/01/18 164/80                 Failed - Recent (12 mo) or future (30 days) visit within the authorizing provider's specialty     Patient has had an office visit with the authorizing provider or a provider within the authorizing providers department within the previous 12 mos or has a future within next 30 days. See \"Patient Info\" tab in inbasket, or \"Choose Columns\" in Meds & Orders section of the refill encounter.              Failed - Normal serum creatinine on file in past 12 months     Recent Labs   Lab Test 10/01/18  1055   CR 1.26*              Failed - Normal serum potassium on file in past 12 months     Recent Labs   Lab Test 10/01/18  1055   POTASSIUM 4.9                    Failed - Normal serum sodium on file in past 12 months     Recent Labs   Lab Test 10/01/18  1055                 Passed - Medication is active on med list        Passed - Patient is age 18 or older        Passed - No active pregancy on record        Passed - No positive pregnancy test in past 12 months           enalapril (VASOTEC) 20 MG tablet [Pharmacy Med Name: ENALAPRIL 20MG TABLETS] 90 tablet 4     Sig: TAKE 1 TABLET(20 MG) BY MOUTH DAILY   Last Written Prescription Date:  01/07/2019  Last Fill Quantity: 90,  # refills: 04   Last office visit: 1/7/2019 with prescribing provider:     Future Office Visit:      ACE Inhibitors " "(Including Combos) Protocol Failed - 3/11/2020 12:34 PM        Failed - Blood pressure under 140/90 in past 12 months     BP Readings from Last 3 Encounters:   01/07/19 122/78   12/13/18 138/84   10/01/18 164/80                 Failed - Recent (12 mo) or future (30 days) visit within the authorizing provider's specialty     Patient has had an office visit with the authorizing provider or a provider within the authorizing providers department within the previous 12 mos or has a future within next 30 days. See \"Patient Info\" tab in inbasket, or \"Choose Columns\" in Meds & Orders section of the refill encounter.              Failed - Normal serum creatinine on file in past 12 months     Recent Labs   Lab Test 10/01/18  1055   CR 1.26*             Failed - Normal serum potassium on file in past 12 months     Recent Labs   Lab Test 10/01/18  1055   POTASSIUM 4.9             Passed - Medication is active on med list        Passed - Patient is age 18 or older        Passed - No active pregnancy on record        Passed - No positive pregnancy test within past 12 months           "

## 2020-03-16 RX ORDER — HYDROCHLOROTHIAZIDE 25 MG/1
TABLET ORAL
Qty: 45 TABLET | Refills: 0 | Status: SHIPPED | OUTPATIENT
Start: 2020-03-16 | End: 2020-06-12

## 2020-03-16 RX ORDER — ENALAPRIL MALEATE 20 MG/1
TABLET ORAL
Qty: 90 TABLET | Refills: 0 | Status: SHIPPED | OUTPATIENT
Start: 2020-03-16 | End: 2020-06-12

## 2020-03-16 NOTE — TELEPHONE ENCOUNTER
Last office visit 1/7/19. Last labs 10/1/18. Please advise due to recent public health concerns. Is it still OK to approve through 7/6/20 with labs this overdue?

## 2020-03-16 NOTE — TELEPHONE ENCOUNTER
Routing refill request to provider for review/approval because:  Patient needs to be seen because it has been more than 1 year since last office visit, labs out of range: CR    Given Covid-19, ok to refill until July?

## 2020-06-09 DIAGNOSIS — I10 ESSENTIAL HYPERTENSION, BENIGN: ICD-10-CM

## 2020-06-10 RX ORDER — ENALAPRIL MALEATE 20 MG/1
TABLET ORAL
Qty: 90 TABLET | Refills: 0 | OUTPATIENT
Start: 2020-06-10

## 2020-06-10 RX ORDER — HYDROCHLOROTHIAZIDE 25 MG/1
TABLET ORAL
Qty: 45 TABLET | Refills: 0 | OUTPATIENT
Start: 2020-06-10

## 2020-06-10 NOTE — TELEPHONE ENCOUNTER
Patient due for annual physical, call to patient. Assisted in scheduling appointment. Patient has enough medication to last her until future appointment. Will route to provider as fyi.

## 2020-06-12 ENCOUNTER — VIRTUAL VISIT (OUTPATIENT)
Dept: INTERNAL MEDICINE | Facility: CLINIC | Age: 80
End: 2020-06-12
Payer: MEDICARE

## 2020-06-12 DIAGNOSIS — E78.2 MIXED HYPERLIPIDEMIA: Primary | ICD-10-CM

## 2020-06-12 DIAGNOSIS — I10 ESSENTIAL HYPERTENSION, BENIGN: ICD-10-CM

## 2020-06-12 DIAGNOSIS — E11.00 TYPE 2 DIABETES MELLITUS WITH HYPEROSMOLARITY WITHOUT COMA, WITHOUT LONG-TERM CURRENT USE OF INSULIN (H): ICD-10-CM

## 2020-06-12 PROCEDURE — 99442 ZZC PHYSICIAN TELEPHONE EVALUATION 11-20 MIN: CPT | Performed by: NURSE PRACTITIONER

## 2020-06-12 RX ORDER — HYDROCHLOROTHIAZIDE 25 MG/1
TABLET ORAL
Qty: 45 TABLET | Refills: 0 | Status: SHIPPED | OUTPATIENT
Start: 2020-06-12 | End: 2020-09-15

## 2020-06-12 RX ORDER — ENALAPRIL MALEATE 20 MG/1
20 TABLET ORAL DAILY
Qty: 90 TABLET | Refills: 0 | Status: SHIPPED | OUTPATIENT
Start: 2020-06-12 | End: 2020-09-15

## 2020-06-12 NOTE — PROGRESS NOTES
"Mikayla Sotomayor is a 79 year old female who is being evaluated via a billable telephone visit.      The patient has been notified of following:     \"This telephone visit will be conducted via a call between you and your physician/provider. We have found that certain health care needs can be provided without the need for a physical exam.  This service lets us provide the care you need with a short phone conversation.  If a prescription is necessary we can send it directly to your pharmacy.  If lab work is needed we can place an order for that and you can then stop by our lab to have the test done at a later time.    Telephone visits are billed at different rates depending on your insurance coverage. During this emergency period, for some insurers they may be billed the same as an in-person visit.  Please reach out to your insurance provider with any questions.    If during the course of the call the physician/provider feels a telephone visit is not appropriate, you will not be charged for this service.\"    Patient has given verbal consent for Telephone visit?  Yes, Yumiko Hernandez MA    What phone number would you like to be contacted at? [phone]    How would you like to obtain your AVS? Patient declined.  Subjective     Mikayla Sotomayor is a 79 year old female who presents via phone visit today for the following health issues:    HPI  Diabetes Follow-up      How often are you checking your blood sugar? Not at all    What concerns do you have today about your diabetes? None     Do you have any of these symptoms? (Select all that apply)  No numbness or tingling in feet.  No redness, sores or blisters on feet.  No complaints of excessive thirst.  No reports of blurry vision.  No significant changes to weight.    Have you had a diabetic eye exam in the last 12 months? No          Hyperlipidemia Follow-Up      Are you regularly taking any medication or supplement to lower your cholesterol?   Yes- " Rosuvastatin    Are you having muscle aches or other side effects that you think could be caused by your cholesterol lowering medication?  No    Hypertension Follow-up      Do you check your blood pressure regularly outside of the clinic? Yes   130-140/60-70    Are you following a low salt diet? Yes    Are your blood pressures ever more than 140 on the top number (systolic) OR more   than 90 on the bottom number (diastolic), for example 140/90? No    BP Readings from Last 2 Encounters:   01/07/19 122/78   12/13/18 138/84     Hemoglobin A1C (%)   Date Value   01/07/2019 6.9 (H)   06/21/2018 6.8     LDL Cholesterol Calculated (mg/dL (calc))   Date Value   06/21/2018 143 (H)   06/21/2017 159 (H)         How many servings of fruits and vegetables do you eat daily?  2-3    On average, how many sweetened beverages do you drink each day (Examples: soda, juice, sweet tea, etc.  Do NOT count diet or artificially sweetened beverages)?   1    How many days per week do you exercise enough to make your heart beat faster? 3 or less     How many minutes a day do you exercise enough to make your heart beat faster? 9 or less    How many days per week do you miss taking your medication? 0             Patient Active Problem List   Diagnosis     Essential hypertension, benign     Diffuse cystic mastopathy     Symptomatic menopausal or female climacteric states     Family history of other cardiovascular diseases     Family history of diabetes mellitus     Mixed hyperlipidemia     Nonspecific abnormal electrocardiogram (ECG) (EKG)     Disorder of bone and cartilage     ACP (advance care planning)     Health Care Home     Mitral valve disorder     Premature atrial complexes     Diabetes mellitus, type 2 (H)     CKD (chronic kidney disease) stage 3, GFR 30-59 ml/min (H)     Past Surgical History:   Procedure Laterality Date     C ANESTH,SURG BREAST RECONSTRUCTIVE      L breast     C APPENDECTOMY      age 5     C C-SEC ONLY,PREV C-SEC   1965-68     C DISCISSION,2ND CATARACT,INCIS  1/2008     C EYE EXAM ESTABLISHED PT  2012     DRAINAGE OF HEMATOMA/FLUID  1980     HC COLONOSCOPY THRU STOMA, DIAGNOSTIC  1/2007     TEST NOT FOUND  March 03    Cardiology    Thal. stess test  reported to the patient as normal       Social History     Tobacco Use     Smoking status: Never Smoker     Smokeless tobacco: Never Used   Substance Use Topics     Alcohol use: Yes     Alcohol/week: 0.0 standard drinks     Comment: rare      Family History   Problem Relation Age of Onset     Hypertension Mother      Gastrointestinal Disease Mother         diverticulosis     Diabetes Father      Heart Disease Father      Hypertension Sister      Diabetes Brother      Breast Cancer No family hx of      Cancer - colorectal No family hx of          Current Outpatient Medications   Medication Sig Dispense Refill     ASPIRIN 81 MG OR TABS 1 tab po QD (Once per day) 100 3     Calcium-Magnesium-Vitamin D (CALCIUM MAGNESIUM PO) Take 2 oz by mouth daily.       Cyanocobalamin (B-12) 1000 MCG TBCR Take 1,000 mcg by mouth daily 100 tablet 1     enalapril (VASOTEC) 20 MG tablet Take 1 tablet (20 mg) by mouth daily 90 tablet 0     FLAXSEED, LINSEED, PO Take 1 tablet by mouth 2 times daily.       hydrochlorothiazide (HYDRODIURIL) 25 MG tablet TAKE 1/2 TABLET(12.5 MG) BY MOUTH DAILY 45 tablet 0     Multiple Vitamins-Minerals (MULTIVITAMIN & MINERAL) LIQD Take 2 oz by mouth daily.       VITAMIN D, CHOLECALCIFEROL, PO Take 5 drops by mouth daily.       rosuvastatin (CRESTOR) 10 MG tablet Take 1 tablet (10 mg) by mouth daily 90 tablet 0     Recent Labs   Lab Test 01/07/19  1059 10/01/18  1055 06/21/18  1041 06/21/18  1024 06/21/17  1054 06/21/17  1048  01/22/16  0908 07/11/13  1649  10/11/12  0942 09/06/12  0952   A1C 6.9*  --   --  6.8 6.7  --    < >  --   --    < >  --   --    LDL  --   --  143*  --   --  159*  --  144*  --   --   --  153*   HDL  --   --  47*  --   --  56  --  60  --   --   --  58    TRIG  --   --  167*  --   --  155*  --  182*  --   --   --  138   ALT  --   --   --   --   --   --   --  6  --   --  9 8   CR  --  1.26* 1.24*  --   --  1.24*   < > 1.33* 1.09*  --  1.01* 1.18*   GFRESTIMATED  --  40* 41*  --   --  42*   < > 38* 49*  --  55* 45*   GFRESTBLACK  --   --   --   --   --   --   --   --  59*  --   --   --    POTASSIUM  --  4.9 4.3  --   --  4.6   < > 4.0 4.4  --  4.7 4.3   TSH 2.27  --   --   --   --   --   --   --   --   --   --  6.83*    < > = values in this interval not displayed.      BP Readings from Last 3 Encounters:   01/07/19 122/78   12/13/18 138/84   10/01/18 164/80    Wt Readings from Last 3 Encounters:   01/07/19 64.4 kg (142 lb)   12/13/18 65.7 kg (144 lb 12.8 oz)   10/01/18 68 kg (150 lb)                    Reviewed and updated as needed this visit by Provider         Review of Systems   Constitutional, HEENT, cardiovascular, pulmonary, gi and gu systems are negative, except as otherwise noted.       Objective   Reported vitals:  There were no vitals taken for this visit.     PSYCH: Alert and oriented times 3; coherent speech, normal   rate and volume, able to articulate logical thoughts, able   to abstract reason, no tangential thoughts, no hallucinations   or delusions  Her affect is normal  RESP: No cough, no audible wheezing, able to talk in full sentences  Remainder of exam unable to be completed due to telephone visits            Assessment/Plan:  1. BENIGN HYPERTENSION    - hydrochlorothiazide (HYDRODIURIL) 25 MG tablet; TAKE 1/2 TABLET(12.5 MG) BY MOUTH DAILY  Dispense: 45 tablet; Refill: 0  - enalapril (VASOTEC) 20 MG tablet; Take 1 tablet (20 mg) by mouth daily  Dispense: 90 tablet; Refill: 0  - **Basic metabolic panel FUTURE anytime; Future  - **CBC with platelets FUTURE anytime; Future    2. Mixed hyperlipidemia    - **ALT FUTURE anytime; Future  - Lipid panel reflex to direct LDL Fasting; Future    3. Type 2 diabetes mellitus with hyperosmolarity without  coma, without long-term current use of insulin (H)    - **A1C FUTURE anytime; Future    Pt refuses to come to lab or OV due to pandemic  The current medical regimen is effective;  continue present plan and medications.        Phone call duration:  14 minutes    Nubia Morocho NP

## 2020-09-14 DIAGNOSIS — I10 ESSENTIAL HYPERTENSION, BENIGN: ICD-10-CM

## 2020-09-15 RX ORDER — HYDROCHLOROTHIAZIDE 25 MG/1
TABLET ORAL
Qty: 45 TABLET | Refills: 0 | Status: SHIPPED | OUTPATIENT
Start: 2020-09-15 | End: 2021-01-07

## 2020-09-15 RX ORDER — ENALAPRIL MALEATE 20 MG/1
TABLET ORAL
Qty: 90 TABLET | Refills: 0 | Status: SHIPPED | OUTPATIENT
Start: 2020-09-15 | End: 2021-01-07

## 2020-09-15 NOTE — TELEPHONE ENCOUNTER
"Last VV on 6/12/20     No current lab work. Pt refusing to come to lab or OV, due to pandemic.   Provider approval needed.     Requested Prescriptions   Pending Prescriptions Disp Refills     enalapril (VASOTEC) 20 MG tablet [Pharmacy Med Name: ENALAPRIL 20MG TABLETS] 90 tablet 0     Sig: TAKE 1 TABLET BY MOUTH DAILY       ACE Inhibitors (Including Combos) Protocol Failed - 9/14/2020 10:10 AM        Failed - Blood pressure under 140/90 in past 12 months     BP Readings from Last 3 Encounters:   01/07/19 122/78   12/13/18 138/84   10/01/18 164/80                 Failed - Normal serum creatinine on file in past 12 months     Recent Labs   Lab Test 10/01/18  1055   CR 1.26*       Ok to refill medication if creatinine is low          Failed - Normal serum potassium on file in past 12 months     Recent Labs   Lab Test 10/01/18  1055   POTASSIUM 4.9             Passed - Recent (12 mo) or future (30 days) visit within the authorizing provider's specialty     Patient has had an office visit with the authorizing provider or a provider within the authorizing providers department within the previous 12 mos or has a future within next 30 days. See \"Patient Info\" tab in inbasket, or \"Choose Columns\" in Meds & Orders section of the refill encounter.              Passed - Medication is active on med list        Passed - Patient is age 18 or older        Passed - No active pregnancy on record        Passed - No positive pregnancy test within past 12 months           hydrochlorothiazide (HYDRODIURIL) 25 MG tablet [Pharmacy Med Name: HYDROCHLOROTHIAZIDE 25MG TABLETS] 45 tablet 0     Sig: TAKE 1/2 TABLET BY MOUTH DAILY       Diuretics (Including Combos) Protocol Failed - 9/14/2020 10:10 AM        Failed - Blood pressure under 140/90 in past 12 months     BP Readings from Last 3 Encounters:   01/07/19 122/78   12/13/18 138/84   10/01/18 164/80                 Failed - Normal serum creatinine on file in past 12 months     Recent Labs " "  Lab Test 10/01/18  1055   CR 1.26*              Failed - Normal serum potassium on file in past 12 months     Recent Labs   Lab Test 10/01/18  1055   POTASSIUM 4.9                    Failed - Normal serum sodium on file in past 12 months     Recent Labs   Lab Test 10/01/18  1055                 Passed - Recent (12 mo) or future (30 days) visit within the authorizing provider's specialty     Patient has had an office visit with the authorizing provider or a provider within the authorizing providers department within the previous 12 mos or has a future within next 30 days. See \"Patient Info\" tab in inbasket, or \"Choose Columns\" in Meds & Orders section of the refill encounter.              Passed - Medication is active on med list        Passed - Patient is age 18 or older        Passed - No active pregancy on record        Passed - No positive pregnancy test in past 12 months             "

## 2021-04-12 DIAGNOSIS — I10 ESSENTIAL HYPERTENSION, BENIGN: ICD-10-CM

## 2021-04-14 RX ORDER — HYDROCHLOROTHIAZIDE 25 MG/1
TABLET ORAL
Qty: 45 TABLET | Refills: 0 | Status: SHIPPED | OUTPATIENT
Start: 2021-04-14 | End: 2021-07-15

## 2021-04-14 RX ORDER — ENALAPRIL MALEATE 20 MG/1
TABLET ORAL
Qty: 90 TABLET | Refills: 0 | Status: SHIPPED | OUTPATIENT
Start: 2021-04-14 | End: 2021-07-15

## 2021-04-14 NOTE — TELEPHONE ENCOUNTER
.Pending Prescriptions:                       Disp   Refills    hydrochlorothiazide (HYDRODIURIL) 25 MG ta*45 tab*0        Sig: TAKE 1/2 TABLET BY MOUTH DAILY    enalapril (VASOTEC) 20 MG tablet [Pharmacy*90 tab*0        Sig: TAKE 1 TABLET BY MOUTH DAILY    Routing refill request to provider for review/approval because:  Patient fails protocol

## 2021-07-15 DIAGNOSIS — I10 ESSENTIAL HYPERTENSION, BENIGN: ICD-10-CM

## 2021-07-15 NOTE — LETTER
[facility]  [address], [address]  [place], [place]  [address]                                            TEL:[phone]  FAX:[phone]      Mikayla Sotomayor  [address] [facility] [address]      July 19, 2021    Dear Mikayla,    We are concerned about your health care.  We recently provided you with a medication refill.  Many medications require routine follow-up with your provider.      At this time we ask that: You schedule a routine office visit with your physician to follow your medications    Your prescription: Has been refilled for 1 month so you may have time for the above noted follow-up.      Thank you,      [facility]

## 2021-07-18 RX ORDER — HYDROCHLOROTHIAZIDE 25 MG/1
TABLET ORAL
Qty: 15 TABLET | Refills: 0 | Status: SHIPPED | OUTPATIENT
Start: 2021-07-18 | End: 2021-08-30

## 2021-07-18 RX ORDER — ENALAPRIL MALEATE 20 MG/1
TABLET ORAL
Qty: 30 TABLET | Refills: 0 | Status: SHIPPED | OUTPATIENT
Start: 2021-07-18 | End: 2021-08-30

## 2021-07-19 NOTE — TELEPHONE ENCOUNTER
Message sent via [employer].      Esperanza Casas CMA  [facility]  [place]

## 2021-08-30 ENCOUNTER — VIRTUAL VISIT (OUTPATIENT)
Dept: INTERNAL MEDICINE | Facility: CLINIC | Age: 81
End: 2021-08-30
Payer: MEDICARE

## 2021-08-30 DIAGNOSIS — I10 ESSENTIAL HYPERTENSION, BENIGN: ICD-10-CM

## 2021-08-30 PROCEDURE — 99213 OFFICE O/P EST LOW 20 MIN: CPT | Mod: 95 | Performed by: NURSE PRACTITIONER

## 2021-08-30 RX ORDER — HYDROCHLOROTHIAZIDE 25 MG/1
TABLET ORAL
Qty: 45 TABLET | Refills: 3 | Status: SHIPPED | OUTPATIENT
Start: 2021-08-30 | End: 2022-10-13

## 2021-08-30 RX ORDER — ENALAPRIL MALEATE 20 MG/1
20 TABLET ORAL DAILY
Qty: 90 TABLET | Refills: 3 | Status: SHIPPED | OUTPATIENT
Start: 2021-08-30 | End: 2022-10-13

## 2021-08-30 NOTE — PROGRESS NOTES
Mikayla is a 81 year old who is being evaluated via a billable telephone visit.      What phone number would you like to be contacted at? Home phone  How would you like to obtain your AVS?     Assessment & Plan     BENIGN HYPERTENSION    - enalapril (VASOTEC) 20 MG tablet; Take 1 tablet (20 mg) by mouth daily  - hydrochlorothiazide (HYDRODIURIL) 25 MG tablet; TAKE 1/2 TABLET BY MOUTH DAILY    Pt refuses to come to clinic for labs, has not had Covid vaccine  F/u 6 months and prn         Nubia Morocho NP  [facility]   Mikayla is a 81 year old who presents for the following health issues     HPI     Hypertension Follow-up      Do you check your blood pressure regularly outside of the clinic? Yes     Are you following a low salt diet? Yes    Are your blood pressures ever more than 140 on the top number (systolic) OR more   than 90 on the bottom number (diastolic), for example 140/90? No      How many servings of fruits and vegetables do you eat daily?  2-3    On average, how many sweetened beverages do you drink each day (Examples: soda, juice, sweet tea, etc.  Do NOT count diet or artificially sweetened beverages)?   1-2    How many days per week do you exercise enough to make your heart beat faster? 3 or less    How many minutes a day do you exercise enough to make your heart beat faster? 10 - 19    How many days per week do you miss taking your medication? 0        Review of Systems   Constitutional, HEENT, cardiovascular, pulmonary, gi and gu systems are negative, except as otherwise noted.      Objective           Vitals:  No vitals were obtained today due to virtual visit.    Physical Exam     PSYCH: Alert and oriented times 3; coherent speech, normal   rate and volume, able to articulate logical thoughts, able   to abstract reason, no tangential thoughts, no hallucinations   or delusions  Her affect is normal  RESP: No cough, no audible wheezing, able to talk in full  sentences  Remainder of exam unable to be completed due to telephone visits                Phone call duration: 11 minutes

## 2022-09-29 DIAGNOSIS — I10 ESSENTIAL HYPERTENSION, BENIGN: ICD-10-CM

## 2022-09-29 NOTE — TELEPHONE ENCOUNTER
Pending Prescriptions:                       Disp   Refills    enalapril (VASOTEC) 20 MG tablet          90 tab*3            Sig: Take 1 tablet (20 mg) by mouth daily    hydrochlorothiazide (HYDRODIURIL) 25 MG t*45 tab*3            Sig: TAKE 1/2 TABLET BY MOUTH DAILY    Routing refill request to provider for review/approval because:  Protocols failed.    Please advise, thanks.

## 2022-09-30 NOTE — TELEPHONE ENCOUNTER
Last visit was a virtual visit in 8/21.  Patient has not actually been here physically since 2019.  Last labs were in 2019.  Call and get patient scheduled with Nubia Morocho first, then we can do the refill.

## 2022-10-10 NOTE — TELEPHONE ENCOUNTER
Routing refill request to provider for review/approval because:  Blood pressure under 140/90 in past 12 months    Recent (12 mo) or future (30 days) visit within the authorizing provider's specialty    Normal serum creatinine on file in past 12 months    Normal serum potassium on file in past 12 months   Puneet ATKINS RN, BSN

## 2022-10-13 DIAGNOSIS — I10 ESSENTIAL HYPERTENSION, BENIGN: ICD-10-CM

## 2022-10-13 RX ORDER — ENALAPRIL MALEATE 20 MG/1
20 TABLET ORAL DAILY
Qty: 90 TABLET | Refills: 3 | Status: SHIPPED | OUTPATIENT
Start: 2022-10-13 | End: 2023-10-31

## 2022-10-13 RX ORDER — ENALAPRIL MALEATE 20 MG/1
20 TABLET ORAL DAILY
Qty: 30 TABLET | Refills: 0 | Status: CANCELLED | OUTPATIENT
Start: 2022-10-13

## 2022-10-13 RX ORDER — HYDROCHLOROTHIAZIDE 25 MG/1
TABLET ORAL
Qty: 45 TABLET | Refills: 3 | Status: SHIPPED | OUTPATIENT
Start: 2022-10-13 | End: 2023-10-31

## 2022-10-13 RX ORDER — HYDROCHLOROTHIAZIDE 25 MG/1
TABLET ORAL
Qty: 15 TABLET | Refills: 0 | Status: CANCELLED | OUTPATIENT
Start: 2022-10-13

## 2023-10-06 DIAGNOSIS — I10 ESSENTIAL HYPERTENSION, BENIGN: ICD-10-CM

## 2023-10-06 RX ORDER — HYDROCHLOROTHIAZIDE 25 MG/1
TABLET ORAL
Qty: 45 TABLET | Refills: 3 | OUTPATIENT
Start: 2023-10-06

## 2023-10-06 RX ORDER — ENALAPRIL MALEATE 20 MG/1
20 TABLET ORAL DAILY
Qty: 90 TABLET | Refills: 3 | OUTPATIENT
Start: 2023-10-06

## 2023-10-30 ENCOUNTER — ALLIED HEALTH/NURSE VISIT (OUTPATIENT)
Dept: FAMILY MEDICINE | Facility: CLINIC | Age: 83
End: 2023-10-30
Payer: MEDICARE

## 2023-10-30 VITALS — DIASTOLIC BLOOD PRESSURE: 92 MMHG | OXYGEN SATURATION: 99 % | HEART RATE: 90 BPM | SYSTOLIC BLOOD PRESSURE: 153 MMHG

## 2023-10-30 DIAGNOSIS — I10 ESSENTIAL HYPERTENSION, BENIGN: Primary | ICD-10-CM

## 2023-10-30 PROCEDURE — 99207 PR NO CHARGE NURSE ONLY: CPT

## 2023-10-30 NOTE — PROGRESS NOTES
Mikayla Sotomayor is a 83 year old year old patient who comes in today for a Blood Pressure check because of ongoing blood pressure monitoring.    Vital Signs as repeated by /92 ! (Right arm, sitting, adult regular cuff)  Patient is taking medication as prescribed  Patient is tolerating medications well.  Patient is monitoring Blood Pressure at home.  Average readings if yes are 130's/80's  Current complaints: none, denies any symptoms, believes she is experiencing white coat HTN    Pt requesting refills of her enalapril (VASOTEC) and hydrochlorothiazide (HYDRODIURIL), has approx 4 days left    >2 years since last visit, pt overdue for OV, needs to establish care with primary provider accepting new patients     Disposition:   -Scheduled pt for OV with Slime Ibarra PA-C tomorrow 10/31 at 12:40pm for medication and BP check   -Educated pt on red flag symptoms, advised to call clinic if experiencing symptoms or if BP is very elevated     Patient was given an opportunity to ask questions, verbalized understanding of plan, and is agreeable.     Valencia BROWN RN  Patient Advocate Liaison - YAHAIRA BROWN [facility]  [phone]

## 2023-10-30 NOTE — PROGRESS NOTES
Mikayla Sotomayor is a 83 year old patient who comes in today for a Blood Pressure check.  Initial BP:  BP (!) 156/90 (BP Location: Right arm, Patient Position: Sitting, Cuff Size: Adult Regular)   Pulse 90   SpO2 99%      Data Unavailable  Disposition: follow-up as previously indicated by provider, results routed to provider, and BP elevated.  Triage RN notified, patient asked to wait

## 2023-10-31 ENCOUNTER — OFFICE VISIT (OUTPATIENT)
Dept: FAMILY MEDICINE | Facility: CLINIC | Age: 83
End: 2023-10-31
Payer: MEDICARE

## 2023-10-31 ENCOUNTER — TELEPHONE (OUTPATIENT)
Dept: FAMILY MEDICINE | Facility: CLINIC | Age: 83
End: 2023-10-31

## 2023-10-31 VITALS
HEIGHT: 59 IN | DIASTOLIC BLOOD PRESSURE: 70 MMHG | SYSTOLIC BLOOD PRESSURE: 172 MMHG | WEIGHT: 140.6 LBS | TEMPERATURE: 97.9 F | HEART RATE: 102 BPM | RESPIRATION RATE: 10 BRPM | BODY MASS INDEX: 28.35 KG/M2 | OXYGEN SATURATION: 97 %

## 2023-10-31 DIAGNOSIS — I10 ESSENTIAL HYPERTENSION, BENIGN: Primary | ICD-10-CM

## 2023-10-31 DIAGNOSIS — I49.49 ECTOPIC BEATS: ICD-10-CM

## 2023-10-31 DIAGNOSIS — Z12.31 VISIT FOR SCREENING MAMMOGRAM: ICD-10-CM

## 2023-10-31 DIAGNOSIS — N18.30 STAGE 3 CHRONIC KIDNEY DISEASE, UNSPECIFIED WHETHER STAGE 3A OR 3B CKD (H): ICD-10-CM

## 2023-10-31 DIAGNOSIS — E11.9 TYPE 2 DIABETES MELLITUS WITHOUT COMPLICATION, WITHOUT LONG-TERM CURRENT USE OF INSULIN (H): ICD-10-CM

## 2023-10-31 LAB
ANION GAP SERPL CALCULATED.3IONS-SCNC: 12 MMOL/L (ref 7–15)
BUN SERPL-MCNC: 25.8 MG/DL (ref 8–23)
CALCIUM SERPL-MCNC: 10.1 MG/DL (ref 8.8–10.2)
CHLORIDE SERPL-SCNC: 103 MMOL/L (ref 98–107)
CHOLEST SERPL-MCNC: 225 MG/DL
CREAT SERPL-MCNC: 1.36 MG/DL (ref 0.51–0.95)
CREAT UR-MCNC: 101 MG/DL
DEPRECATED HCO3 PLAS-SCNC: 26 MMOL/L (ref 22–29)
EGFRCR SERPLBLD CKD-EPI 2021: 38 ML/MIN/1.73M2
GLUCOSE SERPL-MCNC: 144 MG/DL (ref 70–99)
HBA1C MFR BLD: 6.4 % (ref 0–5.6)
HDLC SERPL-MCNC: 65 MG/DL
HGB BLD-MCNC: 13.3 G/DL (ref 11.7–15.7)
LDLC SERPL CALC-MCNC: 133 MG/DL
MICROALBUMIN UR-MCNC: 36.1 MG/L
MICROALBUMIN/CREAT UR: 35.74 MG/G CR (ref 0–25)
NONHDLC SERPL-MCNC: 160 MG/DL
POTASSIUM SERPL-SCNC: 4.4 MMOL/L (ref 3.4–5.3)
SODIUM SERPL-SCNC: 141 MMOL/L (ref 135–145)
TRIGL SERPL-MCNC: 137 MG/DL

## 2023-10-31 PROCEDURE — 80061 LIPID PANEL: CPT | Performed by: PHYSICIAN ASSISTANT

## 2023-10-31 PROCEDURE — 85018 HEMOGLOBIN: CPT | Performed by: PHYSICIAN ASSISTANT

## 2023-10-31 PROCEDURE — 36415 COLL VENOUS BLD VENIPUNCTURE: CPT | Performed by: PHYSICIAN ASSISTANT

## 2023-10-31 PROCEDURE — 93000 ELECTROCARDIOGRAM COMPLETE: CPT | Performed by: PHYSICIAN ASSISTANT

## 2023-10-31 PROCEDURE — 99214 OFFICE O/P EST MOD 30 MIN: CPT | Mod: 25 | Performed by: PHYSICIAN ASSISTANT

## 2023-10-31 PROCEDURE — 82570 ASSAY OF URINE CREATININE: CPT | Performed by: PHYSICIAN ASSISTANT

## 2023-10-31 PROCEDURE — 83036 HEMOGLOBIN GLYCOSYLATED A1C: CPT | Performed by: PHYSICIAN ASSISTANT

## 2023-10-31 PROCEDURE — 80048 BASIC METABOLIC PNL TOTAL CA: CPT | Performed by: PHYSICIAN ASSISTANT

## 2023-10-31 PROCEDURE — 82043 UR ALBUMIN QUANTITATIVE: CPT | Performed by: PHYSICIAN ASSISTANT

## 2023-10-31 RX ORDER — ENALAPRIL MALEATE 20 MG/1
20 TABLET ORAL 2 TIMES DAILY
Qty: 180 TABLET | Refills: 1 | Status: SHIPPED | OUTPATIENT
Start: 2023-10-31 | End: 2024-08-01

## 2023-10-31 RX ORDER — RESPIRATORY SYNCYTIAL VIRUS VACCINE 120MCG/0.5
0.5 KIT INTRAMUSCULAR ONCE
Qty: 1 EACH | Refills: 0 | Status: CANCELLED | OUTPATIENT
Start: 2023-10-31 | End: 2023-10-31

## 2023-10-31 RX ORDER — HYDROCHLOROTHIAZIDE 25 MG/1
TABLET ORAL
Qty: 45 TABLET | Refills: 3 | Status: SHIPPED | OUTPATIENT
Start: 2023-10-31

## 2023-10-31 NOTE — TELEPHONE ENCOUNTER
RN reviewed results message below with patient    No questions at this time     Advised that there are still some labs pending and when they are completed she will be notified of results     Celi Navas Registered Nurse  [facility]

## 2023-10-31 NOTE — TELEPHONE ENCOUNTER
----- Message from Slime Ibarra PA-C sent at 10/31/2023  3:48 PM CDT -----  Please call patient and let her know that her A1c is improved from 4 years ago. Technically she meets criteria for diabetes due to elevated A1c several times (4-6 years ago) but today's A1c is right on the edge of pre-diabetes vs diabetes.  Hemoglobin is normal at 13.3.

## 2023-10-31 NOTE — PROGRESS NOTES
Assessment & Plan     BENIGN HYPERTENSION  High today. She has not been seen in a couple years. She reports blood pressure at home is normal.  She is taking both blood pressure medications at night. Perhaps they are wearing off by the end of the day. I recommended increasing the enalapril and using it twice daily to see if this helps.  Follow up in 2 weeks for recheck.  - hydrochlorothiazide (HYDRODIURIL) 25 MG tablet; TAKE 1/2 TABLET BY MOUTH DAILY  - enalapril (VASOTEC) 20 MG tablet; Take 1 tablet (20 mg) by mouth 2 times daily    Type 2 diabetes mellitus without complication, without long-term current use of insulin (H)  Patient states she did not know she had diabetes.  A1c does show elevations consistent with diabetes in the past few years. Recheck today. She is not taking any medications.  - Lipid panel reflex to direct LDL Non-fasting; Future  - HEMOGLOBIN A1C; Future  - Lipid panel reflex to direct LDL Non-fasting  - HEMOGLOBIN A1C    Stage 3 chronic kidney disease, unspecified whether stage 3a or 3b CKD (H)  Check labs today.  - BASIC METABOLIC PANEL; Future  - Albumin Random Urine Quantitative with Creat Ratio; Future  - Hemoglobin; Future  - BASIC METABOLIC PANEL  - Albumin Random Urine Quantitative with Creat Ratio  - Hemoglobin    Ectopic beats  On exam noted ectopic beats. EKG does not show atrial fibrillation. Reassurance given to patient.  - EKG 12-lead complete w/read - Clinics    Visit for screening mammogram  Declined mammogram.    Review of external notes as documented elsewhere in note  Review of the result(s) of each unique test - Previous A1c results  Ordering of each unique test  Prescription drug management      Slime Ibarra PA-C  [facility] TENISHA Degroot is a 83 year old, presenting for the following health issues:  Recheck Medication and Hypertension        10/31/2023    12:39 PM   Additional Questions   Roomed by Bertin MAN       History of Present  "Illness       Hypertension: She presents for follow up of hypertension.  She does check blood pressure  regularly outside of the clinic. Outpatient blood pressures have not been over 140/90. She does not follow a low salt diet.     She eats 2-3 servings of fruits and vegetables daily.She consumes 1 sweetened beverage(s) daily.She exercises with enough effort to increase her heart rate 9 or less minutes per day.  She exercises with enough effort to increase her heart rate 3 or less days per week.   She is taking medications regularly.     Hypertension Follow-up    Do you check your blood pressure regularly outside of the clinic? Yes   Are you following a low salt diet? No  Are your blood pressures ever more than 140 on the top number (systolic) OR more   than 90 on the bottom number (diastolic), for example 140/90? Yes      Medication Followup of Hydrochlorothiazide and Enalapril  Taking Medication as prescribed: yes  Side Effects:  None  Medication Helping Symptoms:  yes    She reports blood pressure at home is in the 130-140's/80's at home    Review of Systems   GENERAL:  No fevers or chills  RESP:  No shortness of breath  CARDIAC: No chest pain        Objective    BP (!) 172/70   Pulse 102   Temp 97.9  F (36.6  C) (Oral)   Resp 10   Ht 1.499 m (4' 11\")   Wt 63.8 kg (140 lb 9.6 oz)   SpO2 97%   BMI 28.40 kg/m    Body mass index is 28.4 kg/m .  Physical Exam   GENERAL: No acute distress  HEENT: Normocephalic, PERRL  CARDIAC: Regular rate and rhythm. 2/6 systolic ejection murmur.  NEURO: Alert and non-focal      EKG read by myself: Sinus rhythm with ectopic beats    Results for orders placed or performed in visit on 10/31/23 (from the past 24 hour(s))   HEMOGLOBIN A1C   Result Value Ref Range    Hemoglobin A1C 6.4 (H) 0.0 - 5.6 %   Hemoglobin   Result Value Ref Range    Hemoglobin 13.3 11.7 - 15.7 g/dL                 "

## 2023-11-01 NOTE — TELEPHONE ENCOUNTER
Please call patient and let her know that cholesterol is high. I would highly recommend taking a statin medication. She reported not taking the Crestor would she be open to trying it again?   Electrolytes are normal. Creatinine shows chronic kidney disease stage 3 (similar to previous).  Urine albumin (looking for small proteins in the urine) is a little high. The enalapril can help us with this as it is kidney protective. Also getting the blood pressure better control may help.  I would recommend we recheck BMP in 2 weeks with the nurse only BP check. This was ordered.

## 2023-11-01 NOTE — TELEPHONE ENCOUNTER
Called patient and advised of below.  Patient will discuss statin with  and daughter but does not feel she wants to take.  No lab appt on 11/14/23 when she has bp check scheduled.  Added to note to ask MA to draw.  Liliana Wilson RN

## 2023-11-02 NOTE — TELEPHONE ENCOUNTER
Called pt-informed she does not need to fast, lab results mailed, 11/02/2023    Treasure Harris/MATT

## 2023-11-02 NOTE — TELEPHONE ENCOUNTER
Labs do not need to be fasting. Please let her know  Can you please mail copy of labs to patient's house?

## 2023-11-02 NOTE — TELEPHONE ENCOUNTER
Slime,    Should pt's lab appt be fasting?    Routing  to MA/TC: If pt's lab needs to be fasting, pt's appt needs to be rescheduled   Also can you mail a copy of pt's labs to her house?    Thank you  Mis Wheatley RN on 11/2/2023 at 12:21 PM

## 2024-08-01 ENCOUNTER — TELEPHONE (OUTPATIENT)
Dept: FAMILY MEDICINE | Facility: CLINIC | Age: 84
End: 2024-08-01
Payer: MEDICARE

## 2024-08-01 DIAGNOSIS — I10 ESSENTIAL HYPERTENSION, BENIGN: ICD-10-CM

## 2024-08-01 RX ORDER — HYDROCHLOROTHIAZIDE 25 MG/1
TABLET ORAL
Qty: 45 TABLET | Refills: 3 | OUTPATIENT
Start: 2024-08-01

## 2024-08-01 RX ORDER — ENALAPRIL MALEATE 20 MG/1
20 TABLET ORAL 2 TIMES DAILY
Qty: 180 TABLET | Refills: 0 | Status: SHIPPED | OUTPATIENT
Start: 2024-08-01

## 2024-08-01 NOTE — TELEPHONE ENCOUNTER
Refill provided but patient needs to establish care and is due for labs. Please help her schedule.

## 2024-10-09 ENCOUNTER — OFFICE VISIT (OUTPATIENT)
Dept: FAMILY MEDICINE | Facility: CLINIC | Age: 84
End: 2024-10-09
Payer: MEDICARE

## 2024-10-09 VITALS
WEIGHT: 139 LBS | BODY MASS INDEX: 28.02 KG/M2 | RESPIRATION RATE: 11 BRPM | HEIGHT: 59 IN | HEART RATE: 95 BPM | TEMPERATURE: 98 F | OXYGEN SATURATION: 97 % | SYSTOLIC BLOOD PRESSURE: 140 MMHG | DIASTOLIC BLOOD PRESSURE: 80 MMHG

## 2024-10-09 DIAGNOSIS — E11.22 TYPE 2 DIABETES MELLITUS WITH STAGE 3 CHRONIC KIDNEY DISEASE, WITHOUT LONG-TERM CURRENT USE OF INSULIN, UNSPECIFIED WHETHER STAGE 3A OR 3B CKD (H): ICD-10-CM

## 2024-10-09 DIAGNOSIS — N18.30 TYPE 2 DIABETES MELLITUS WITH STAGE 3 CHRONIC KIDNEY DISEASE, WITHOUT LONG-TERM CURRENT USE OF INSULIN, UNSPECIFIED WHETHER STAGE 3A OR 3B CKD (H): ICD-10-CM

## 2024-10-09 DIAGNOSIS — I10 ESSENTIAL HYPERTENSION, BENIGN: Primary | ICD-10-CM

## 2024-10-09 DIAGNOSIS — N18.30 STAGE 3 CHRONIC KIDNEY DISEASE, UNSPECIFIED WHETHER STAGE 3A OR 3B CKD (H): ICD-10-CM

## 2024-10-09 LAB
EST. AVERAGE GLUCOSE BLD GHB EST-MCNC: 146 MG/DL
HBA1C MFR BLD: 6.7 % (ref 0–5.6)
HGB BLD-MCNC: 13 G/DL (ref 11.7–15.7)

## 2024-10-09 PROCEDURE — 36415 COLL VENOUS BLD VENIPUNCTURE: CPT

## 2024-10-09 PROCEDURE — 99214 OFFICE O/P EST MOD 30 MIN: CPT

## 2024-10-09 PROCEDURE — 85018 HEMOGLOBIN: CPT

## 2024-10-09 PROCEDURE — 82043 UR ALBUMIN QUANTITATIVE: CPT

## 2024-10-09 PROCEDURE — 82570 ASSAY OF URINE CREATININE: CPT

## 2024-10-09 PROCEDURE — 83036 HEMOGLOBIN GLYCOSYLATED A1C: CPT

## 2024-10-09 PROCEDURE — G2211 COMPLEX E/M VISIT ADD ON: HCPCS

## 2024-10-09 PROCEDURE — 80048 BASIC METABOLIC PNL TOTAL CA: CPT

## 2024-10-09 PROCEDURE — 80061 LIPID PANEL: CPT

## 2024-10-09 RX ORDER — ENALAPRIL MALEATE 20 MG/1
20 TABLET ORAL 2 TIMES DAILY
Qty: 180 TABLET | Refills: 3 | Status: SHIPPED | OUTPATIENT
Start: 2024-10-09

## 2024-10-09 RX ORDER — HYDROCHLOROTHIAZIDE 25 MG/1
TABLET ORAL
Qty: 45 TABLET | Refills: 3 | Status: SHIPPED | OUTPATIENT
Start: 2024-10-09

## 2024-10-09 RX ORDER — ENALAPRIL MALEATE 20 MG/1
20 TABLET ORAL 2 TIMES DAILY
Qty: 180 TABLET | Refills: 0 | Status: SHIPPED | OUTPATIENT
Start: 2024-10-09 | End: 2024-10-09

## 2024-10-09 NOTE — LETTER
October 11, 2024      Mikayla Sotomayor  [address] [facility] [address]        Dear ,    We are writing to inform you of your test results.    Your labs look stable except for her hemoglobin A1c came back at 6.7, which is in the diabetic range.     I recommend limiting processed carbohydrates and refined sugars and to increase her walking as tolerated. If you would like to start an oral medication for diabetes, please call [phone] to make an appointment to discuss.     Resulted Orders   HEMOGLOBIN A1C   Result Value Ref Range    Estimated Average Glucose 146 (H) <117 mg/dL    Hemoglobin A1C 6.7 (H) 0.0 - 5.6 %      Comment:      Normal <5.7%   Prediabetes 5.7-6.4%    Diabetes 6.5% or higher     Note: Adopted from ADA consensus guidelines.   Lipid panel reflex to direct LDL Non-fasting   Result Value Ref Range    Cholesterol 235 (H) <200 mg/dL    Triglycerides 117 <150 mg/dL    Direct Measure HDL 61 >=50 mg/dL    LDL Cholesterol Calculated 151 (H) <100 mg/dL    Non HDL Cholesterol 174 (H) <130 mg/dL    Patient Fasting > 8hrs? No     Narrative    Cholesterol  Desirable: < 200 mg/dL  Borderline High: 200 - 239 mg/dL  High: >= 240 mg/dL    Triglycerides  Normal: < 150 mg/dL  Borderline High: 150 - 199 mg/dL  High: 200-499 mg/dL  Very High: >= 500 mg/dL    Direct Measure HDL  Female: >= 50 mg/dL   Male: >= 40 mg/dL    LDL Cholesterol  Desirable: < 100 mg/dL  Above Desirable: 100 - 129 mg/dL   Borderline High: 130 - 159 mg/dL   High:  160 - 189 mg/dL   Very High: >= 190 mg/dL    Non HDL Cholesterol  Desirable: < 130 mg/dL  Above Desirable: 130 - 159 mg/dL  Borderline High: 160 - 189 mg/dL  High: 190 - 219 mg/dL  Very High: >= 220 mg/dL   Basic metabolic panel  (Ca, Cl, CO2, Creat, Gluc, K, Na, BUN)   Result Value Ref Range    Sodium 140 135 - 145 mmol/L    Potassium 4.9 3.4 - 5.3 mmol/L    Chloride 104 98 - 107 mmol/L    Carbon Dioxide (CO2) 26 22 - 29 mmol/L    Anion Gap 10 7 - 15 mmol/L     Urea Nitrogen 32.3 (H) 8.0 - 23.0 mg/dL    Creatinine 1.34 (H) 0.51 - 0.95 mg/dL    GFR Estimate 38 (L) >60 mL/min/1.73m2      Comment:      eGFR calculated using 2021 CKD-EPI equation.    Calcium 10.1 8.8 - 10.4 mg/dL      Comment:      Reference intervals for this test were updated on 7/16/2024 to reflect our healthy population more accurately. There may be differences in the flagging of prior results with similar values performed with this method. Those prior results can be interpreted in the context of the updated reference intervals.    Glucose 150 (H) 70 - 99 mg/dL    Patient Fasting > 8hrs? No    Hemoglobin   Result Value Ref Range    Hemoglobin 13.0 11.7 - 15.7 g/dL       If you have any questions or concerns, please call the clinic at the number listed above.       Sincerely,      KOMAL Purvis CNP

## 2024-10-09 NOTE — PROGRESS NOTES
"  Assessment & Plan     (I10) BENIGN HYPERTENSION  (primary encounter diagnosis)  Comment: Chronic stable medical condition; continue present management on current treatment regimen of hydrochlorothiazide 12.5 mg daily and enalapril 20 mg BID. Continue to monitor blood pressures at home. Refilled medication today  Plan: hydrochlorothiazide (HYDRODIURIL) 25 MG tablet,        enalapril (VASOTEC) 20 MG tablet    (E11.22,  N18.30) Type 2 diabetes mellitus with stage 3 chronic kidney disease, without long-term current use of insulin, unspecified whether stage 3a or 3b CKD (H)  Comment: Chronic medical condition due for monitoring. Patient's last A1c was 6.4. Diet controlled.   Plan: HEMOGLOBIN A1C, Lipid panel reflex to direct         LDL Non-fasting    (N18.30) Stage 3 chronic kidney disease, unspecified whether stage 3a or 3b CKD (H)  Comment: Chronic medical condition due for monitoring.   Plan: Albumin Random Urine Quantitative with Creat         Ratio, Basic metabolic panel  (Ca, Cl, CO2,         Creat, Gluc, K, Na, BUN), Hemoglobin      BMI  Estimated body mass index is 28.07 kg/m  as calculated from the following:    Height as of this encounter: 1.499 m (4' 11\").    Weight as of this encounter: 63 kg (139 lb).     The longitudinal plan of care for the diagnosis(es)/condition(s) as documented were addressed during this visit. Due to the added complexity in care, I will continue to support Mikayla in the subsequent management and with ongoing continuity of care.    Subjective   Mikayla is a 84 year old, presenting for the following health issues:  Hypertension and Recheck Medication        10/9/2024    10:48 AM   Additional Questions   Roomed by Leny LIU   Patient presents to the clinic today to follow-up on her chronic medical conditions. She declines all vaccines and mammogram. She is agreeable to have labs completed today.    Hypertension Follow-up    Do you check your blood pressure regularly outside of the " "clinic? Yes   Are you following a low salt diet? Yes  Are your blood pressures ever more than 140 on the top number (systolic) OR more   than 90 on the bottom number (diastolic), for example 140/90? Yes  How many servings of fruits and vegetables do you eat daily?  2-3  On average, how many sweetened beverages do you drink each day (Examples: soda, juice, sweet tea, etc.  Do NOT count diet or artificially sweetened beverages)?   0  How many days per week do you exercise enough to make your heart beat faster? 3 or less  How many minutes a day do you exercise enough to make your heart beat faster? 9 or less  How many days per week do you miss taking your medication? 0    Hypertension - Patient checks her blood pressures daily and they have been running 140s/80s.         Objective    BP (!) 140/80   Pulse 95   Temp 98  F (36.7  C) (Oral)   Resp 11   Ht 1.499 m (4' 11\")   Wt 63 kg (139 lb)   SpO2 97%   BMI 28.07 kg/m    Body mass index is 28.07 kg/m .  Physical Exam   GENERAL: alert and no distress  RESP: lungs clear to auscultation - no rales, rhonchi or wheezes  CV: regular rates and rhythm, normal S1 S2, no S3 or S4, murmur heard, peripheral pulses strong, and no peripheral edema  MS: no gross musculoskeletal defects noted, no edema        Signed Electronically by: KOMAL Purvis CNP    "

## 2024-10-10 LAB
ANION GAP SERPL CALCULATED.3IONS-SCNC: 10 MMOL/L (ref 7–15)
BUN SERPL-MCNC: 32.3 MG/DL (ref 8–23)
CALCIUM SERPL-MCNC: 10.1 MG/DL (ref 8.8–10.4)
CHLORIDE SERPL-SCNC: 104 MMOL/L (ref 98–107)
CHOLEST SERPL-MCNC: 235 MG/DL
CREAT SERPL-MCNC: 1.34 MG/DL (ref 0.51–0.95)
CREAT UR-MCNC: 63.4 MG/DL
EGFRCR SERPLBLD CKD-EPI 2021: 38 ML/MIN/1.73M2
FASTING STATUS PATIENT QL REPORTED: NO
FASTING STATUS PATIENT QL REPORTED: NO
GLUCOSE SERPL-MCNC: 150 MG/DL (ref 70–99)
HCO3 SERPL-SCNC: 26 MMOL/L (ref 22–29)
HDLC SERPL-MCNC: 61 MG/DL
LDLC SERPL CALC-MCNC: 151 MG/DL
MICROALBUMIN UR-MCNC: <12 MG/L
MICROALBUMIN/CREAT UR: NORMAL MG/G{CREAT}
NONHDLC SERPL-MCNC: 174 MG/DL
POTASSIUM SERPL-SCNC: 4.9 MMOL/L (ref 3.4–5.3)
SODIUM SERPL-SCNC: 140 MMOL/L (ref 135–145)
TRIGL SERPL-MCNC: 117 MG/DL

## 2025-01-11 ENCOUNTER — TRANSFERRED RECORDS (OUTPATIENT)
Dept: MULTI SPECIALTY CLINIC | Facility: CLINIC | Age: 85
End: 2025-01-11

## 2025-01-11 LAB — RETINOPATHY: NORMAL

## 2025-01-23 ENCOUNTER — TELEPHONE (OUTPATIENT)
Dept: FAMILY MEDICINE | Facility: CLINIC | Age: 85
End: 2025-01-23
Payer: MEDICARE

## 2025-01-23 NOTE — TELEPHONE ENCOUNTER
Patient Quality Outreach    Patient is due for the following:   Colon Cancer Screening  Physical Annual Wellness Visit      Topic Date Due    Pneumococcal Vaccine (1 of 2 - PCV) Never done    Diptheria Tetanus Pertussis (DTAP/TDAP/TD) Vaccine (1 - Tdap) 06/13/2003    Zoster (Shingles) Vaccine (2 of 2) 02/07/2019    Flu Vaccine (1) 09/01/2024    COVID-19 Vaccine (1 - 2024-25 season) Never done       Action(s) Taken:   Contacted patient to remind them to schedule their Annual Wellness exam and Colon Cancer screening     Type of outreach:    Sent letter.    Questions for provider review:    None           Tia Swanson CMA

## 2025-01-23 NOTE — LETTER
January 23, 2025      Mikayla Sotomayor  [address] [facility] [address]        Dear Donny Degroot-    I care about your health and have reviewed your health plan. I have reviewed your medical conditions, medication list, and lab results and am making recommendations based on this review, to better manage your health.    You are in particular need of attention regarding:  -Colon Cancer Screening  -Wellness (Physical) Visit     I am recommending that you:  {recommendations:-schedule a WELLNESS (Physical) APPOINTMENT with me.   I will check fasting labs the same day - nothing to eat except water and meds for 8-10 hours prior.  -schedule a COLONOSCOPY to look for colon cancer (due every 10 years or 5 years in higher risk situations.)   Colon cancer is now the second leading cause of death in the [place] for both men and women and there are over 130,000 new cases and 50,000 deaths per year from colon cancer.  Colonoscopies can prevent 90-95% of these deaths.  Problem lesions can be removed before they ever become cancer.  This test is not only looking for cancer, but also getting rid of precancerious lesions.  If you do not wish to do a colonoscopy or cannot afford to do one, at this time, there is another option. It is called a FIT test or Fecal Immunochemical Occult Blood Test (take home stool sample kit).  It does not replace the colonoscopy for colorectal cancer screening, but it can detect hidden bleeding in the lower colon.  It does need to be repeated every year and if a positive result is obtained, you would be referred for a colonoscopy.  If you have completed either one of these tests at another facility, please have the records sent to our clinic so that we can best coordinate your care.    Please call us at [phone] (or use [employer]) to address the above recommendations.     Thank you for trusting us with your health care.We look forward to supporting your healthcare needs in the  future.        Sincerely,    Your [facility] Team

## 2025-03-21 ENCOUNTER — TRANSFERRED RECORDS (OUTPATIENT)
Dept: HEALTH INFORMATION MANAGEMENT | Facility: CLINIC | Age: 85
End: 2025-03-21

## 2025-05-09 ENCOUNTER — HOSPITAL ENCOUNTER (INPATIENT)
Facility: CLINIC | Age: 85
LOS: 1 days | Discharge: OTHER INSTITUTION | DRG: 270 | End: 2025-05-10
Attending: EMERGENCY MEDICINE | Admitting: INTERNAL MEDICINE
Payer: COMMERCIAL

## 2025-05-09 ENCOUNTER — APPOINTMENT (OUTPATIENT)
Dept: CT IMAGING | Facility: CLINIC | Age: 85
DRG: 270 | End: 2025-05-09
Attending: EMERGENCY MEDICINE
Payer: COMMERCIAL

## 2025-05-09 DIAGNOSIS — I21.4 NSTEMI (NON-ST ELEVATED MYOCARDIAL INFARCTION) (H): ICD-10-CM

## 2025-05-09 DIAGNOSIS — R94.31 ABNORMAL ELECTROCARDIOGRAM: ICD-10-CM

## 2025-05-09 DIAGNOSIS — J81.0 PULMONARY EDEMA, ACUTE (H): ICD-10-CM

## 2025-05-09 DIAGNOSIS — I77.1 SUBCLAVIAN ARTERY STENOSIS, RIGHT: ICD-10-CM

## 2025-05-09 LAB
ALBUMIN SERPL BCG-MCNC: 4.5 G/DL (ref 3.5–5.2)
ALP SERPL-CCNC: 67 U/L (ref 40–150)
ALT SERPL W P-5'-P-CCNC: 11 U/L (ref 0–50)
ANION GAP SERPL CALCULATED.3IONS-SCNC: 14 MMOL/L (ref 7–15)
AST SERPL W P-5'-P-CCNC: 31 U/L (ref 0–45)
BASOPHILS # BLD AUTO: 0.1 10E3/UL (ref 0–0.2)
BASOPHILS NFR BLD AUTO: 1 %
BILIRUB SERPL-MCNC: 0.2 MG/DL
BUN SERPL-MCNC: 26.8 MG/DL (ref 8–23)
CALCIUM SERPL-MCNC: 10 MG/DL (ref 8.8–10.4)
CHLORIDE SERPL-SCNC: 104 MMOL/L (ref 98–107)
CREAT SERPL-MCNC: 1.66 MG/DL (ref 0.51–0.95)
EGFRCR SERPLBLD CKD-EPI 2021: 30 ML/MIN/1.73M2
EOSINOPHIL # BLD AUTO: 0.6 10E3/UL (ref 0–0.7)
EOSINOPHIL NFR BLD AUTO: 6 %
ERYTHROCYTE [DISTWIDTH] IN BLOOD BY AUTOMATED COUNT: 13.7 % (ref 10–15)
GLUCOSE BLDC GLUCOMTR-MCNC: 123 MG/DL (ref 70–99)
GLUCOSE SERPL-MCNC: 145 MG/DL (ref 70–99)
HCO3 SERPL-SCNC: 23 MMOL/L (ref 22–29)
HCT VFR BLD AUTO: 40.7 % (ref 35–47)
HGB BLD-MCNC: 13.5 G/DL (ref 11.7–15.7)
IMM GRANULOCYTES # BLD: 0 10E3/UL
IMM GRANULOCYTES NFR BLD: 0 %
LYMPHOCYTES # BLD AUTO: 1.5 10E3/UL (ref 0.8–5.3)
LYMPHOCYTES NFR BLD AUTO: 14 %
MAGNESIUM SERPL-MCNC: 2.1 MG/DL (ref 1.7–2.3)
MCH RBC QN AUTO: 30.5 PG (ref 26.5–33)
MCHC RBC AUTO-ENTMCNC: 33.2 G/DL (ref 31.5–36.5)
MCV RBC AUTO: 92 FL (ref 78–100)
MONOCYTES # BLD AUTO: 1 10E3/UL (ref 0–1.3)
MONOCYTES NFR BLD AUTO: 9 %
NEUTROPHILS # BLD AUTO: 8 10E3/UL (ref 1.6–8.3)
NEUTROPHILS NFR BLD AUTO: 71 %
NRBC # BLD AUTO: 0 10E3/UL
NRBC BLD AUTO-RTO: 0 /100
NT-PROBNP SERPL-MCNC: 1462 PG/ML (ref 0–624)
PLAT MORPH BLD: NORMAL
PLATELET # BLD AUTO: 246 10E3/UL (ref 150–450)
POTASSIUM SERPL-SCNC: 4.5 MMOL/L (ref 3.4–5.3)
PROT SERPL-MCNC: 7.5 G/DL (ref 6.4–8.3)
RBC # BLD AUTO: 4.42 10E6/UL (ref 3.8–5.2)
RBC MORPH BLD: NORMAL
SODIUM SERPL-SCNC: 141 MMOL/L (ref 135–145)
TROPONIN T SERPL HS-MCNC: 126 NG/L
TROPONIN T SERPL HS-MCNC: 654 NG/L
WBC # BLD AUTO: 11.2 10E3/UL (ref 4–11)

## 2025-05-09 PROCEDURE — 250N000011 HC RX IP 250 OP 636: Performed by: INTERNAL MEDICINE

## 2025-05-09 PROCEDURE — 84484 ASSAY OF TROPONIN QUANT: CPT | Performed by: INTERNAL MEDICINE

## 2025-05-09 PROCEDURE — 85041 AUTOMATED RBC COUNT: CPT | Performed by: INTERNAL MEDICINE

## 2025-05-09 PROCEDURE — 84484 ASSAY OF TROPONIN QUANT: CPT | Performed by: EMERGENCY MEDICINE

## 2025-05-09 PROCEDURE — 82962 GLUCOSE BLOOD TEST: CPT

## 2025-05-09 PROCEDURE — 83880 ASSAY OF NATRIURETIC PEPTIDE: CPT | Performed by: EMERGENCY MEDICINE

## 2025-05-09 PROCEDURE — 82565 ASSAY OF CREATININE: CPT | Performed by: EMERGENCY MEDICINE

## 2025-05-09 PROCEDURE — 250N000011 HC RX IP 250 OP 636: Performed by: EMERGENCY MEDICINE

## 2025-05-09 PROCEDURE — 250N000013 HC RX MED GY IP 250 OP 250 PS 637: Performed by: INTERNAL MEDICINE

## 2025-05-09 PROCEDURE — 250N000011 HC RX IP 250 OP 636: Mod: JZ | Performed by: EMERGENCY MEDICINE

## 2025-05-09 PROCEDURE — 36415 COLL VENOUS BLD VENIPUNCTURE: CPT | Performed by: INTERNAL MEDICINE

## 2025-05-09 PROCEDURE — 250N000009 HC RX 250: Performed by: EMERGENCY MEDICINE

## 2025-05-09 PROCEDURE — 36415 COLL VENOUS BLD VENIPUNCTURE: CPT | Performed by: EMERGENCY MEDICINE

## 2025-05-09 PROCEDURE — 71260 CT THORAX DX C+: CPT

## 2025-05-09 PROCEDURE — 99223 1ST HOSP IP/OBS HIGH 75: CPT | Performed by: INTERNAL MEDICINE

## 2025-05-09 PROCEDURE — 83735 ASSAY OF MAGNESIUM: CPT | Performed by: INTERNAL MEDICINE

## 2025-05-09 PROCEDURE — 120N000001 HC R&B MED SURG/OB

## 2025-05-09 PROCEDURE — 85025 COMPLETE CBC W/AUTO DIFF WBC: CPT | Performed by: EMERGENCY MEDICINE

## 2025-05-09 RX ORDER — AMOXICILLIN 250 MG
2 CAPSULE ORAL 2 TIMES DAILY PRN
Status: DISCONTINUED | OUTPATIENT
Start: 2025-05-09 | End: 2025-05-10 | Stop reason: HOSPADM

## 2025-05-09 RX ORDER — HEPARIN SODIUM 10000 [USP'U]/100ML
0-5000 INJECTION, SOLUTION INTRAVENOUS CONTINUOUS
Status: DISCONTINUED | OUTPATIENT
Start: 2025-05-09 | End: 2025-05-09

## 2025-05-09 RX ORDER — ACETAMINOPHEN 650 MG/1
650 SUPPOSITORY RECTAL EVERY 4 HOURS PRN
Status: DISCONTINUED | OUTPATIENT
Start: 2025-05-09 | End: 2025-05-10 | Stop reason: HOSPADM

## 2025-05-09 RX ORDER — ONDANSETRON 4 MG/1
4 TABLET, ORALLY DISINTEGRATING ORAL EVERY 6 HOURS PRN
Status: DISCONTINUED | OUTPATIENT
Start: 2025-05-09 | End: 2025-05-10 | Stop reason: HOSPADM

## 2025-05-09 RX ORDER — HYDROMORPHONE HYDROCHLORIDE 1 MG/ML
0.5 INJECTION, SOLUTION INTRAMUSCULAR; INTRAVENOUS; SUBCUTANEOUS
Refills: 0 | Status: DISCONTINUED | OUTPATIENT
Start: 2025-05-09 | End: 2025-05-09

## 2025-05-09 RX ORDER — PROCHLORPERAZINE MALEATE 5 MG/1
5 TABLET ORAL EVERY 6 HOURS PRN
Status: DISCONTINUED | OUTPATIENT
Start: 2025-05-09 | End: 2025-05-10 | Stop reason: HOSPADM

## 2025-05-09 RX ORDER — HYDRALAZINE HYDROCHLORIDE 10 MG/1
10 TABLET, FILM COATED ORAL 3 TIMES DAILY
Status: DISCONTINUED | OUTPATIENT
Start: 2025-05-10 | End: 2025-05-10 | Stop reason: HOSPADM

## 2025-05-09 RX ORDER — DEXTROSE MONOHYDRATE 25 G/50ML
25-50 INJECTION, SOLUTION INTRAVENOUS
Status: DISCONTINUED | OUTPATIENT
Start: 2025-05-09 | End: 2025-05-10 | Stop reason: HOSPADM

## 2025-05-09 RX ORDER — HYDROMORPHONE HCL IN WATER/PF 6 MG/30 ML
0.4 PATIENT CONTROLLED ANALGESIA SYRINGE INTRAVENOUS
Status: DISCONTINUED | OUTPATIENT
Start: 2025-05-09 | End: 2025-05-10 | Stop reason: HOSPADM

## 2025-05-09 RX ORDER — IOPAMIDOL 755 MG/ML
500 INJECTION, SOLUTION INTRAVASCULAR ONCE
Status: COMPLETED | OUTPATIENT
Start: 2025-05-09 | End: 2025-05-09

## 2025-05-09 RX ORDER — HYDROMORPHONE HYDROCHLORIDE 2 MG/1
2 TABLET ORAL EVERY 4 HOURS PRN
Status: DISCONTINUED | OUTPATIENT
Start: 2025-05-09 | End: 2025-05-10 | Stop reason: HOSPADM

## 2025-05-09 RX ORDER — HEPARIN SODIUM 10000 [USP'U]/100ML
0-5000 INJECTION, SOLUTION INTRAVENOUS CONTINUOUS
Status: DISCONTINUED | OUTPATIENT
Start: 2025-05-09 | End: 2025-05-10 | Stop reason: HOSPADM

## 2025-05-09 RX ORDER — ACETAMINOPHEN 325 MG/1
650 TABLET ORAL EVERY 4 HOURS PRN
Status: DISCONTINUED | OUTPATIENT
Start: 2025-05-09 | End: 2025-05-10 | Stop reason: HOSPADM

## 2025-05-09 RX ORDER — ASPIRIN 81 MG/1
81 TABLET ORAL DAILY
Status: DISCONTINUED | OUTPATIENT
Start: 2025-05-10 | End: 2025-05-10 | Stop reason: HOSPADM

## 2025-05-09 RX ORDER — HYDROMORPHONE HCL IN WATER/PF 6 MG/30 ML
0.2 PATIENT CONTROLLED ANALGESIA SYRINGE INTRAVENOUS
Status: DISCONTINUED | OUTPATIENT
Start: 2025-05-09 | End: 2025-05-10 | Stop reason: HOSPADM

## 2025-05-09 RX ORDER — ATORVASTATIN CALCIUM 10 MG/1
10 TABLET, FILM COATED ORAL EVERY EVENING
Status: DISCONTINUED | OUTPATIENT
Start: 2025-05-09 | End: 2025-05-10 | Stop reason: HOSPADM

## 2025-05-09 RX ORDER — HYDROXYZINE HYDROCHLORIDE 10 MG/1
10 TABLET, FILM COATED ORAL 2 TIMES DAILY PRN
Status: DISCONTINUED | OUTPATIENT
Start: 2025-05-09 | End: 2025-05-10 | Stop reason: HOSPADM

## 2025-05-09 RX ORDER — LABETALOL HYDROCHLORIDE 5 MG/ML
10 INJECTION, SOLUTION INTRAVENOUS ONCE
Status: DISCONTINUED | OUTPATIENT
Start: 2025-05-09 | End: 2025-05-09

## 2025-05-09 RX ORDER — ONDANSETRON 2 MG/ML
4 INJECTION INTRAMUSCULAR; INTRAVENOUS EVERY 6 HOURS PRN
Status: DISCONTINUED | OUTPATIENT
Start: 2025-05-09 | End: 2025-05-10 | Stop reason: HOSPADM

## 2025-05-09 RX ORDER — METOPROLOL TARTRATE 25 MG/1
25 TABLET, FILM COATED ORAL 2 TIMES DAILY
Status: DISCONTINUED | OUTPATIENT
Start: 2025-05-09 | End: 2025-05-10 | Stop reason: HOSPADM

## 2025-05-09 RX ORDER — CALCIUM CARBONATE 500 MG/1
1000 TABLET, CHEWABLE ORAL 4 TIMES DAILY PRN
Status: DISCONTINUED | OUTPATIENT
Start: 2025-05-09 | End: 2025-05-10 | Stop reason: HOSPADM

## 2025-05-09 RX ORDER — LIDOCAINE 40 MG/G
CREAM TOPICAL
Status: DISCONTINUED | OUTPATIENT
Start: 2025-05-09 | End: 2025-05-10 | Stop reason: HOSPADM

## 2025-05-09 RX ORDER — NICOTINE POLACRILEX 4 MG
15-30 LOZENGE BUCCAL
Status: DISCONTINUED | OUTPATIENT
Start: 2025-05-09 | End: 2025-05-10 | Stop reason: HOSPADM

## 2025-05-09 RX ORDER — FUROSEMIDE 10 MG/ML
20 INJECTION INTRAMUSCULAR; INTRAVENOUS ONCE
Status: COMPLETED | OUTPATIENT
Start: 2025-05-09 | End: 2025-05-09

## 2025-05-09 RX ORDER — AMOXICILLIN 250 MG
1 CAPSULE ORAL 2 TIMES DAILY PRN
Status: DISCONTINUED | OUTPATIENT
Start: 2025-05-09 | End: 2025-05-10 | Stop reason: HOSPADM

## 2025-05-09 RX ORDER — POLYETHYLENE GLYCOL 3350 17 G/17G
17 POWDER, FOR SOLUTION ORAL 2 TIMES DAILY PRN
Status: DISCONTINUED | OUTPATIENT
Start: 2025-05-09 | End: 2025-05-10 | Stop reason: HOSPADM

## 2025-05-09 RX ORDER — BISACODYL 10 MG
10 SUPPOSITORY, RECTAL RECTAL DAILY PRN
Status: DISCONTINUED | OUTPATIENT
Start: 2025-05-09 | End: 2025-05-10 | Stop reason: HOSPADM

## 2025-05-09 RX ADMIN — IOPAMIDOL 72 ML: 755 INJECTION, SOLUTION INTRAVENOUS at 20:51

## 2025-05-09 RX ADMIN — ATORVASTATIN CALCIUM 10 MG: 10 TABLET, FILM COATED ORAL at 23:46

## 2025-05-09 RX ADMIN — METOPROLOL TARTRATE 25 MG: 25 TABLET, FILM COATED ORAL at 23:46

## 2025-05-09 RX ADMIN — HEPARIN SODIUM 700 UNITS/HR: 10000 INJECTION, SOLUTION INTRAVENOUS at 21:58

## 2025-05-09 RX ADMIN — FUROSEMIDE 20 MG: 10 INJECTION, SOLUTION INTRAMUSCULAR; INTRAVENOUS at 23:34

## 2025-05-09 RX ADMIN — SODIUM CHLORIDE 60 ML: 9 INJECTION, SOLUTION INTRAVENOUS at 20:51

## 2025-05-09 RX ADMIN — HYDROMORPHONE HYDROCHLORIDE 0.5 MG: 1 INJECTION, SOLUTION INTRAMUSCULAR; INTRAVENOUS; SUBCUTANEOUS at 20:40

## 2025-05-09 ASSESSMENT — COLUMBIA-SUICIDE SEVERITY RATING SCALE - C-SSRS
1. IN THE PAST MONTH, HAVE YOU WISHED YOU WERE DEAD OR WISHED YOU COULD GO TO SLEEP AND NOT WAKE UP?: NO
6. HAVE YOU EVER DONE ANYTHING, STARTED TO DO ANYTHING, OR PREPARED TO DO ANYTHING TO END YOUR LIFE?: NO
2. HAVE YOU ACTUALLY HAD ANY THOUGHTS OF KILLING YOURSELF IN THE PAST MONTH?: NO

## 2025-05-09 ASSESSMENT — ACTIVITIES OF DAILY LIVING (ADL)
ADLS_ACUITY_SCORE: 41

## 2025-05-10 ENCOUNTER — APPOINTMENT (OUTPATIENT)
Dept: CARDIOLOGY | Facility: CLINIC | Age: 85
DRG: 270 | End: 2025-05-10
Attending: INTERNAL MEDICINE
Payer: COMMERCIAL

## 2025-05-10 ENCOUNTER — APPOINTMENT (OUTPATIENT)
Dept: ULTRASOUND IMAGING | Facility: CLINIC | Age: 85
DRG: 233 | End: 2025-05-10
Attending: NURSE PRACTITIONER
Payer: COMMERCIAL

## 2025-05-10 ENCOUNTER — HOSPITAL ENCOUNTER (INPATIENT)
Facility: CLINIC | Age: 85
DRG: 233 | End: 2025-05-10
Admitting: STUDENT IN AN ORGANIZED HEALTH CARE EDUCATION/TRAINING PROGRAM
Payer: COMMERCIAL

## 2025-05-10 ENCOUNTER — ANESTHESIA EVENT (OUTPATIENT)
Dept: SURGERY | Facility: CLINIC | Age: 85
End: 2025-05-10
Payer: COMMERCIAL

## 2025-05-10 VITALS
HEIGHT: 59 IN | HEART RATE: 80 BPM | BODY MASS INDEX: 26.81 KG/M2 | DIASTOLIC BLOOD PRESSURE: 77 MMHG | RESPIRATION RATE: 16 BRPM | WEIGHT: 133 LBS | OXYGEN SATURATION: 98 % | SYSTOLIC BLOOD PRESSURE: 148 MMHG | TEMPERATURE: 98.2 F

## 2025-05-10 DIAGNOSIS — I21.3 ST ELEVATION MI (STEMI) (H): ICD-10-CM

## 2025-05-10 DIAGNOSIS — Z95.1 S/P CABG (CORONARY ARTERY BYPASS GRAFT): ICD-10-CM

## 2025-05-10 DIAGNOSIS — I21.4 NSTEMI (NON-ST ELEVATED MYOCARDIAL INFARCTION) (H): Primary | ICD-10-CM

## 2025-05-10 PROBLEM — Z98.890 STATUS POST CORONARY ANGIOGRAM: Status: ACTIVE | Noted: 2025-05-10

## 2025-05-10 LAB
ABO + RH BLD: NORMAL
ACANTHOCYTES BLD QL SMEAR: SLIGHT
ACT BLD: 162 SECONDS (ref 74–150)
ALBUMIN SERPL BCG-MCNC: 3.9 G/DL (ref 3.5–5.2)
ALBUMIN UR-MCNC: NEGATIVE MG/DL
ALP SERPL-CCNC: 60 U/L (ref 40–150)
ALT SERPL W P-5'-P-CCNC: 22 U/L (ref 0–50)
ANION GAP SERPL CALCULATED.3IONS-SCNC: 12 MMOL/L (ref 7–15)
ANION GAP SERPL CALCULATED.3IONS-SCNC: 16 MMOL/L (ref 7–15)
APPEARANCE UR: CLEAR
APTT PPP: 33 SECONDS (ref 22–38)
AST SERPL W P-5'-P-CCNC: 393 U/L (ref 0–45)
BILIRUB SERPL-MCNC: 0.3 MG/DL
BILIRUB UR QL STRIP: NEGATIVE
BLD GP AB SCN SERPL QL: NEGATIVE
BLD PROD TYP BPU: NORMAL
BLD PROD TYP BPU: NORMAL
BLOOD COMPONENT TYPE: NORMAL
BLOOD COMPONENT TYPE: NORMAL
BUN SERPL-MCNC: 28.6 MG/DL (ref 8–23)
BUN SERPL-MCNC: 31 MG/DL (ref 8–23)
CA-I BLD-MCNC: 4.4 MG/DL (ref 4.4–5.2)
CALCIUM SERPL-MCNC: 9.6 MG/DL (ref 8.8–10.4)
CALCIUM SERPL-MCNC: 9.7 MG/DL (ref 8.8–10.4)
CHLORIDE SERPL-SCNC: 101 MMOL/L (ref 98–107)
CHLORIDE SERPL-SCNC: 102 MMOL/L (ref 98–107)
CHOLEST SERPL-MCNC: 206 MG/DL
CODING SYSTEM: NORMAL
CODING SYSTEM: NORMAL
COLOR UR AUTO: NORMAL
CREAT SERPL-MCNC: 1.66 MG/DL (ref 0.51–0.95)
CREAT SERPL-MCNC: 1.68 MG/DL (ref 0.51–0.95)
CROSSMATCH: NORMAL
CROSSMATCH: NORMAL
EGFRCR SERPLBLD CKD-EPI 2021: 29 ML/MIN/1.73M2
EGFRCR SERPLBLD CKD-EPI 2021: 30 ML/MIN/1.73M2
ELLIPTOCYTES BLD QL SMEAR: SLIGHT
ELLIPTOCYTES BLD QL SMEAR: SLIGHT
ERYTHROCYTE [DISTWIDTH] IN BLOOD BY AUTOMATED COUNT: 13.5 % (ref 10–15)
ERYTHROCYTE [DISTWIDTH] IN BLOOD BY AUTOMATED COUNT: 13.7 % (ref 10–15)
ERYTHROCYTE [DISTWIDTH] IN BLOOD BY AUTOMATED COUNT: 13.8 % (ref 10–15)
EST. AVERAGE GLUCOSE BLD GHB EST-MCNC: 126 MG/DL
FIBRINOGEN PPP-MCNC: 412 MG/DL (ref 170–510)
FRAGMENTS BLD QL SMEAR: ABNORMAL
GLUCOSE BLDC GLUCOMTR-MCNC: 145 MG/DL (ref 70–99)
GLUCOSE BLDC GLUCOMTR-MCNC: 163 MG/DL (ref 70–99)
GLUCOSE SERPL-MCNC: 136 MG/DL (ref 70–99)
GLUCOSE SERPL-MCNC: 98 MG/DL (ref 70–99)
GLUCOSE UR STRIP-MCNC: NEGATIVE MG/DL
HBA1C MFR BLD: 6 %
HCO3 SERPL-SCNC: 22 MMOL/L (ref 22–29)
HCO3 SERPL-SCNC: 25 MMOL/L (ref 22–29)
HCT VFR BLD AUTO: 34.7 % (ref 35–47)
HCT VFR BLD AUTO: 37 % (ref 35–47)
HCT VFR BLD AUTO: 38.9 % (ref 35–47)
HDLC SERPL-MCNC: 61 MG/DL
HGB BLD-MCNC: 11.8 G/DL (ref 11.7–15.7)
HGB BLD-MCNC: 12.2 G/DL (ref 11.7–15.7)
HGB BLD-MCNC: 12.9 G/DL (ref 11.7–15.7)
HGB UR QL STRIP: NEGATIVE
INR PPP: 1.06 (ref 0.85–1.15)
ISSUE DATE AND TIME: NORMAL
ISSUE DATE AND TIME: NORMAL
KETONES UR STRIP-MCNC: NEGATIVE MG/DL
LDLC SERPL CALC-MCNC: 127 MG/DL
LEUKOCYTE ESTERASE UR QL STRIP: NEGATIVE
LVEF ECHO: NORMAL
MAGNESIUM SERPL-MCNC: 2.2 MG/DL (ref 1.7–2.3)
MCH RBC QN AUTO: 30.3 PG (ref 26.5–33)
MCH RBC QN AUTO: 30.5 PG (ref 26.5–33)
MCH RBC QN AUTO: 30.6 PG (ref 26.5–33)
MCHC RBC AUTO-ENTMCNC: 33 G/DL (ref 31.5–36.5)
MCHC RBC AUTO-ENTMCNC: 33.2 G/DL (ref 31.5–36.5)
MCHC RBC AUTO-ENTMCNC: 34 G/DL (ref 31.5–36.5)
MCV RBC AUTO: 90 FL (ref 78–100)
MCV RBC AUTO: 92 FL (ref 78–100)
MCV RBC AUTO: 92 FL (ref 78–100)
NITRATE UR QL: NEGATIVE
NONHDLC SERPL-MCNC: 145 MG/DL
PH UR STRIP: 5.5 [PH] (ref 5–7)
PHOSPHATE SERPL-MCNC: 4.2 MG/DL (ref 2.5–4.5)
PLAT MORPH BLD: ABNORMAL
PLAT MORPH BLD: ABNORMAL
PLATELET # BLD AUTO: 201 10E3/UL (ref 150–450)
PLATELET # BLD AUTO: 215 10E3/UL (ref 150–450)
PLATELET # BLD AUTO: 240 10E3/UL (ref 150–450)
POTASSIUM SERPL-SCNC: 4 MMOL/L (ref 3.4–5.3)
POTASSIUM SERPL-SCNC: 4.4 MMOL/L (ref 3.4–5.3)
PROT SERPL-MCNC: 6.8 G/DL (ref 6.4–8.3)
PROTHROMBIN TIME: 13.6 SECONDS (ref 11.8–14.8)
RBC # BLD AUTO: 3.86 10E6/UL (ref 3.8–5.2)
RBC # BLD AUTO: 4.02 10E6/UL (ref 3.8–5.2)
RBC # BLD AUTO: 4.23 10E6/UL (ref 3.8–5.2)
RBC MORPH BLD: ABNORMAL
RBC MORPH BLD: ABNORMAL
SODIUM SERPL-SCNC: 138 MMOL/L (ref 135–145)
SODIUM SERPL-SCNC: 140 MMOL/L (ref 135–145)
SP GR UR STRIP: 1.01 (ref 1–1.03)
SPECIMEN EXP DATE BLD: NORMAL
TRIGL SERPL-MCNC: 91 MG/DL
TROPONIN T SERPL HS-MCNC: 1550 NG/L
TROPONIN T SERPL HS-MCNC: 2844 NG/L
UFH PPP CHRO-ACNC: 0.22 IU/ML (ref ?–1.1)
UFH PPP CHRO-ACNC: 0.77 IU/ML (ref ?–1.1)
UNIT ABO/RH: NORMAL
UNIT ABO/RH: NORMAL
UNIT NUMBER: NORMAL
UNIT NUMBER: NORMAL
UNIT STATUS: NORMAL
UNIT STATUS: NORMAL
UNIT TYPE ISBT: 6200
UNIT TYPE ISBT: 6200
UROBILINOGEN UR STRIP-MCNC: NORMAL MG/DL
WBC # BLD AUTO: 11.1 10E3/UL (ref 4–11)
WBC # BLD AUTO: 12.4 10E3/UL (ref 4–11)
WBC # BLD AUTO: 12.7 10E3/UL (ref 4–11)

## 2025-05-10 PROCEDURE — 93454 CORONARY ARTERY ANGIO S&I: CPT | Performed by: INTERNAL MEDICINE

## 2025-05-10 PROCEDURE — 86923 COMPATIBILITY TEST ELECTRIC: CPT | Performed by: PHYSICIAN ASSISTANT

## 2025-05-10 PROCEDURE — 36415 COLL VENOUS BLD VENIPUNCTURE: CPT | Performed by: STUDENT IN AN ORGANIZED HEALTH CARE EDUCATION/TRAINING PROGRAM

## 2025-05-10 PROCEDURE — 250N000013 HC RX MED GY IP 250 OP 250 PS 637: Performed by: STUDENT IN AN ORGANIZED HEALTH CARE EDUCATION/TRAINING PROGRAM

## 2025-05-10 PROCEDURE — B2111ZZ FLUOROSCOPY OF MULTIPLE CORONARY ARTERIES USING LOW OSMOLAR CONTRAST: ICD-10-PCS | Performed by: INTERNAL MEDICINE

## 2025-05-10 PROCEDURE — 82962 GLUCOSE BLOOD TEST: CPT

## 2025-05-10 PROCEDURE — 93970 EXTREMITY STUDY: CPT

## 2025-05-10 PROCEDURE — 250N000011 HC RX IP 250 OP 636

## 2025-05-10 PROCEDURE — 85027 COMPLETE CBC AUTOMATED: CPT | Performed by: STUDENT IN AN ORGANIZED HEALTH CARE EDUCATION/TRAINING PROGRAM

## 2025-05-10 PROCEDURE — 99291 CRITICAL CARE FIRST HOUR: CPT | Mod: GC | Performed by: STUDENT IN AN ORGANIZED HEALTH CARE EDUCATION/TRAINING PROGRAM

## 2025-05-10 PROCEDURE — 84484 ASSAY OF TROPONIN QUANT: CPT | Performed by: STUDENT IN AN ORGANIZED HEALTH CARE EDUCATION/TRAINING PROGRAM

## 2025-05-10 PROCEDURE — 255N000002 HC RX 255 OP 636: Performed by: INTERNAL MEDICINE

## 2025-05-10 PROCEDURE — 83735 ASSAY OF MAGNESIUM: CPT | Performed by: STUDENT IN AN ORGANIZED HEALTH CARE EDUCATION/TRAINING PROGRAM

## 2025-05-10 PROCEDURE — 93454 CORONARY ARTERY ANGIO S&I: CPT | Mod: 26 | Performed by: INTERNAL MEDICINE

## 2025-05-10 PROCEDURE — 250N000013 HC RX MED GY IP 250 OP 250 PS 637: Performed by: INTERNAL MEDICINE

## 2025-05-10 PROCEDURE — 82565 ASSAY OF CREATININE: CPT | Performed by: INTERNAL MEDICINE

## 2025-05-10 PROCEDURE — 84100 ASSAY OF PHOSPHORUS: CPT | Performed by: STUDENT IN AN ORGANIZED HEALTH CARE EDUCATION/TRAINING PROGRAM

## 2025-05-10 PROCEDURE — 99153 MOD SED SAME PHYS/QHP EA: CPT | Performed by: INTERNAL MEDICINE

## 2025-05-10 PROCEDURE — 85520 HEPARIN ASSAY: CPT | Performed by: STUDENT IN AN ORGANIZED HEALTH CARE EDUCATION/TRAINING PROGRAM

## 2025-05-10 PROCEDURE — 86900 BLOOD TYPING SEROLOGIC ABO: CPT | Performed by: STUDENT IN AN ORGANIZED HEALTH CARE EDUCATION/TRAINING PROGRAM

## 2025-05-10 PROCEDURE — 83036 HEMOGLOBIN GLYCOSYLATED A1C: CPT | Performed by: STUDENT IN AN ORGANIZED HEALTH CARE EDUCATION/TRAINING PROGRAM

## 2025-05-10 PROCEDURE — 85520 HEPARIN ASSAY: CPT | Performed by: INTERNAL MEDICINE

## 2025-05-10 PROCEDURE — C1725 CATH, TRANSLUMIN NON-LASER: HCPCS | Performed by: INTERNAL MEDICINE

## 2025-05-10 PROCEDURE — 85041 AUTOMATED RBC COUNT: CPT | Performed by: INTERNAL MEDICINE

## 2025-05-10 PROCEDURE — 250N000009 HC RX 250

## 2025-05-10 PROCEDURE — 250N000011 HC RX IP 250 OP 636: Performed by: INTERNAL MEDICINE

## 2025-05-10 PROCEDURE — 85730 THROMBOPLASTIN TIME PARTIAL: CPT | Performed by: NURSE PRACTITIONER

## 2025-05-10 PROCEDURE — C8929 TTE W OR WO FOL WCON,DOPPLER: HCPCS

## 2025-05-10 PROCEDURE — 82330 ASSAY OF CALCIUM: CPT | Performed by: STUDENT IN AN ORGANIZED HEALTH CARE EDUCATION/TRAINING PROGRAM

## 2025-05-10 PROCEDURE — 80061 LIPID PANEL: CPT | Performed by: INTERNAL MEDICINE

## 2025-05-10 PROCEDURE — C1894 INTRO/SHEATH, NON-LASER: HCPCS | Performed by: INTERNAL MEDICINE

## 2025-05-10 PROCEDURE — 258N000003 HC RX IP 258 OP 636

## 2025-05-10 PROCEDURE — 5A02210 ASSISTANCE WITH CARDIAC OUTPUT USING BALLOON PUMP, CONTINUOUS: ICD-10-PCS | Performed by: INTERNAL MEDICINE

## 2025-05-10 PROCEDURE — 99152 MOD SED SAME PHYS/QHP 5/>YRS: CPT | Performed by: INTERNAL MEDICINE

## 2025-05-10 PROCEDURE — C1769 GUIDE WIRE: HCPCS | Performed by: INTERNAL MEDICINE

## 2025-05-10 PROCEDURE — 82310 ASSAY OF CALCIUM: CPT | Performed by: STUDENT IN AN ORGANIZED HEALTH CARE EDUCATION/TRAINING PROGRAM

## 2025-05-10 PROCEDURE — 99223 1ST HOSP IP/OBS HIGH 75: CPT | Mod: 25 | Performed by: INTERNAL MEDICINE

## 2025-05-10 PROCEDURE — 85384 FIBRINOGEN ACTIVITY: CPT | Performed by: NURSE PRACTITIONER

## 2025-05-10 PROCEDURE — 86923 COMPATIBILITY TEST ELECTRIC: CPT | Performed by: STUDENT IN AN ORGANIZED HEALTH CARE EDUCATION/TRAINING PROGRAM

## 2025-05-10 PROCEDURE — 200N000001 HC R&B ICU

## 2025-05-10 PROCEDURE — 36415 COLL VENOUS BLD VENIPUNCTURE: CPT | Performed by: INTERNAL MEDICINE

## 2025-05-10 PROCEDURE — 250N000012 HC RX MED GY IP 250 OP 636 PS 637: Performed by: INTERNAL MEDICINE

## 2025-05-10 PROCEDURE — 33967 INSERT I-AORT PERCUT DEVICE: CPT | Performed by: INTERNAL MEDICINE

## 2025-05-10 PROCEDURE — 99223 1ST HOSP IP/OBS HIGH 75: CPT | Mod: 57 | Performed by: STUDENT IN AN ORGANIZED HEALTH CARE EDUCATION/TRAINING PROGRAM

## 2025-05-10 PROCEDURE — 81003 URINALYSIS AUTO W/O SCOPE: CPT | Performed by: NURSE PRACTITIONER

## 2025-05-10 PROCEDURE — 85610 PROTHROMBIN TIME: CPT | Performed by: NURSE PRACTITIONER

## 2025-05-10 PROCEDURE — 5A1935Z RESPIRATORY VENTILATION, LESS THAN 24 CONSECUTIVE HOURS: ICD-10-PCS | Performed by: INTERNAL MEDICINE

## 2025-05-10 PROCEDURE — 86901 BLOOD TYPING SEROLOGIC RH(D): CPT | Performed by: STUDENT IN AN ORGANIZED HEALTH CARE EDUCATION/TRAINING PROGRAM

## 2025-05-10 PROCEDURE — 99238 HOSP IP/OBS DSCHRG MGMT 30/<: CPT | Performed by: STUDENT IN AN ORGANIZED HEALTH CARE EDUCATION/TRAINING PROGRAM

## 2025-05-10 PROCEDURE — 250N000011 HC RX IP 250 OP 636: Performed by: STUDENT IN AN ORGANIZED HEALTH CARE EDUCATION/TRAINING PROGRAM

## 2025-05-10 PROCEDURE — 82435 ASSAY OF BLOOD CHLORIDE: CPT | Performed by: STUDENT IN AN ORGANIZED HEALTH CARE EDUCATION/TRAINING PROGRAM

## 2025-05-10 PROCEDURE — 33967 INSERT I-AORT PERCUT DEVICE: CPT | Mod: GC | Performed by: INTERNAL MEDICINE

## 2025-05-10 PROCEDURE — 93306 TTE W/DOPPLER COMPLETE: CPT | Mod: 26 | Performed by: INTERNAL MEDICINE

## 2025-05-10 PROCEDURE — 258N000003 HC RX IP 258 OP 636: Performed by: STUDENT IN AN ORGANIZED HEALTH CARE EDUCATION/TRAINING PROGRAM

## 2025-05-10 PROCEDURE — 93880 EXTRACRANIAL BILAT STUDY: CPT

## 2025-05-10 PROCEDURE — 272N000001 HC OR GENERAL SUPPLY STERILE: Performed by: INTERNAL MEDICINE

## 2025-05-10 PROCEDURE — 85347 COAGULATION TIME ACTIVATED: CPT

## 2025-05-10 PROCEDURE — 99152 MOD SED SAME PHYS/QHP 5/>YRS: CPT | Mod: GC | Performed by: INTERNAL MEDICINE

## 2025-05-10 PROCEDURE — 3E043XZ INTRODUCTION OF VASOPRESSOR INTO CENTRAL VEIN, PERCUTANEOUS APPROACH: ICD-10-PCS | Performed by: INTERNAL MEDICINE

## 2025-05-10 RX ORDER — NALOXONE HYDROCHLORIDE 0.4 MG/ML
0.4 INJECTION, SOLUTION INTRAMUSCULAR; INTRAVENOUS; SUBCUTANEOUS
Status: ACTIVE | OUTPATIENT
Start: 2025-05-10 | End: 2025-05-10

## 2025-05-10 RX ORDER — OXYCODONE HYDROCHLORIDE 5 MG/1
10 TABLET ORAL EVERY 4 HOURS PRN
Status: DISCONTINUED | OUTPATIENT
Start: 2025-05-10 | End: 2025-05-10

## 2025-05-10 RX ORDER — NALOXONE HYDROCHLORIDE 0.4 MG/ML
0.2 INJECTION, SOLUTION INTRAMUSCULAR; INTRAVENOUS; SUBCUTANEOUS
Status: ACTIVE | OUTPATIENT
Start: 2025-05-10 | End: 2025-05-10

## 2025-05-10 RX ORDER — HYDROMORPHONE HCL IN WATER/PF 6 MG/30 ML
0.1 PATIENT CONTROLLED ANALGESIA SYRINGE INTRAVENOUS
Status: DISCONTINUED | OUTPATIENT
Start: 2025-05-10 | End: 2025-05-11

## 2025-05-10 RX ORDER — FENTANYL CITRATE 50 UG/ML
25 INJECTION, SOLUTION INTRAMUSCULAR; INTRAVENOUS
Status: DISCONTINUED | OUTPATIENT
Start: 2025-05-10 | End: 2025-05-11

## 2025-05-10 RX ORDER — NITROGLYCERIN 0.4 MG/1
0.4 TABLET SUBLINGUAL EVERY 5 MIN PRN
Status: DISCONTINUED | OUTPATIENT
Start: 2025-05-10 | End: 2025-05-10 | Stop reason: HOSPADM

## 2025-05-10 RX ORDER — DEXTROSE MONOHYDRATE 25 G/50ML
25-50 INJECTION, SOLUTION INTRAVENOUS
Status: DISCONTINUED | OUTPATIENT
Start: 2025-05-10 | End: 2025-05-11

## 2025-05-10 RX ORDER — ACETAMINOPHEN 325 MG/1
650 TABLET ORAL EVERY 4 HOURS PRN
Status: DISCONTINUED | OUTPATIENT
Start: 2025-05-10 | End: 2025-05-10

## 2025-05-10 RX ORDER — OXYCODONE HYDROCHLORIDE 5 MG/1
5 TABLET ORAL EVERY 4 HOURS PRN
Status: DISCONTINUED | OUTPATIENT
Start: 2025-05-10 | End: 2025-05-10

## 2025-05-10 RX ORDER — SODIUM CHLORIDE 9 MG/ML
75 INJECTION, SOLUTION INTRAVENOUS CONTINUOUS
Status: ACTIVE | OUTPATIENT
Start: 2025-05-10 | End: 2025-05-10

## 2025-05-10 RX ORDER — IOPAMIDOL 755 MG/ML
INJECTION, SOLUTION INTRAVASCULAR
Status: DISCONTINUED | OUTPATIENT
Start: 2025-05-10 | End: 2025-05-10 | Stop reason: HOSPADM

## 2025-05-10 RX ORDER — ONDANSETRON 2 MG/ML
INJECTION INTRAMUSCULAR; INTRAVENOUS
Status: DISCONTINUED | OUTPATIENT
Start: 2025-05-10 | End: 2025-05-10 | Stop reason: HOSPADM

## 2025-05-10 RX ORDER — SODIUM CHLORIDE 9 MG/ML
INJECTION, SOLUTION INTRAVENOUS CONTINUOUS
Status: DISCONTINUED | OUTPATIENT
Start: 2025-05-10 | End: 2025-05-13

## 2025-05-10 RX ORDER — FENTANYL CITRATE 50 UG/ML
INJECTION, SOLUTION INTRAMUSCULAR; INTRAVENOUS
Status: DISCONTINUED | OUTPATIENT
Start: 2025-05-10 | End: 2025-05-10 | Stop reason: HOSPADM

## 2025-05-10 RX ORDER — LANOLIN ALCOHOL/MO/W.PET/CERES
1000 CREAM (GRAM) TOPICAL DAILY
Status: DISCONTINUED | OUTPATIENT
Start: 2025-05-10 | End: 2025-05-10 | Stop reason: HOSPADM

## 2025-05-10 RX ORDER — NICOTINE POLACRILEX 4 MG
15-30 LOZENGE BUCCAL
Status: DISCONTINUED | OUTPATIENT
Start: 2025-05-10 | End: 2025-05-11

## 2025-05-10 RX ORDER — ASPIRIN 81 MG/1
TABLET, CHEWABLE ORAL
Status: DISCONTINUED | OUTPATIENT
Start: 2025-05-10 | End: 2025-05-10 | Stop reason: HOSPADM

## 2025-05-10 RX ORDER — OXYCODONE HYDROCHLORIDE 5 MG/1
5 TABLET ORAL EVERY 4 HOURS PRN
Refills: 0 | Status: DISCONTINUED | OUTPATIENT
Start: 2025-05-10 | End: 2025-05-11

## 2025-05-10 RX ORDER — FLUMAZENIL 0.1 MG/ML
0.2 INJECTION, SOLUTION INTRAVENOUS
Status: ACTIVE | OUTPATIENT
Start: 2025-05-10 | End: 2025-05-10

## 2025-05-10 RX ORDER — ATROPINE SULFATE 0.1 MG/ML
0.5 INJECTION INTRAVENOUS
Status: ACTIVE | OUTPATIENT
Start: 2025-05-10 | End: 2025-05-10

## 2025-05-10 RX ORDER — ACETAMINOPHEN 325 MG/1
650 TABLET ORAL EVERY 4 HOURS PRN
Status: DISCONTINUED | OUTPATIENT
Start: 2025-05-10 | End: 2025-05-19 | Stop reason: HOSPADM

## 2025-05-10 RX ORDER — HEPARIN SODIUM 10000 [USP'U]/100ML
0-5000 INJECTION, SOLUTION INTRAVENOUS CONTINUOUS
Status: DISCONTINUED | OUTPATIENT
Start: 2025-05-10 | End: 2025-05-11

## 2025-05-10 RX ADMIN — MELATONIN TAB 3 MG 3 MG: 3 TAB at 22:24

## 2025-05-10 RX ADMIN — NITROGLYCERIN 0.4 MG: 0.4 TABLET SUBLINGUAL at 00:59

## 2025-05-10 RX ADMIN — METOPROLOL TARTRATE 25 MG: 25 TABLET, FILM COATED ORAL at 08:06

## 2025-05-10 RX ADMIN — HUMAN ALBUMIN MICROSPHERES AND PERFLUTREN 3 ML: 10; .22 INJECTION, SOLUTION INTRAVENOUS at 09:34

## 2025-05-10 RX ADMIN — SODIUM CHLORIDE: 0.9 INJECTION, SOLUTION INTRAVENOUS at 17:31

## 2025-05-10 RX ADMIN — HEPARIN SODIUM 400 UNITS/HR: 10000 INJECTION, SOLUTION INTRAVENOUS at 06:43

## 2025-05-10 RX ADMIN — INSULIN ASPART 1 UNITS: 100 INJECTION, SOLUTION INTRAVENOUS; SUBCUTANEOUS at 08:06

## 2025-05-10 RX ADMIN — ASPIRIN 81 MG: 81 TABLET, COATED ORAL at 08:05

## 2025-05-10 RX ADMIN — HEPARIN SODIUM 700 UNITS/HR: 10000 INJECTION, SOLUTION INTRAVENOUS at 13:50

## 2025-05-10 RX ADMIN — HYDRALAZINE HYDROCHLORIDE 10 MG: 10 TABLET ORAL at 08:38

## 2025-05-10 RX ADMIN — HYDROMORPHONE HYDROCHLORIDE 0.1 MG: 0.2 INJECTION, SOLUTION INTRAMUSCULAR; INTRAVENOUS; SUBCUTANEOUS at 20:04

## 2025-05-10 RX ADMIN — OXYCODONE HYDROCHLORIDE 2.5 MG: 5 TABLET ORAL at 18:59

## 2025-05-10 ASSESSMENT — ACTIVITIES OF DAILY LIVING (ADL)
ADLS_ACUITY_SCORE: 42
ADLS_ACUITY_SCORE: 41
ADLS_ACUITY_SCORE: 22
ADLS_ACUITY_SCORE: 42
ADLS_ACUITY_SCORE: 42
ADLS_ACUITY_SCORE: 44
ADLS_ACUITY_SCORE: 37
ADLS_ACUITY_SCORE: 40
ADLS_ACUITY_SCORE: 22
ADLS_ACUITY_SCORE: 44
ADLS_ACUITY_SCORE: 42
ADLS_ACUITY_SCORE: 42
ADLS_ACUITY_SCORE: 41
ADLS_ACUITY_SCORE: 42
ADLS_ACUITY_SCORE: 37
ADLS_ACUITY_SCORE: 41
ADLS_ACUITY_SCORE: 44
ADLS_ACUITY_SCORE: 41
ADLS_ACUITY_SCORE: 44
ADLS_ACUITY_SCORE: 44

## 2025-05-10 NOTE — PROGRESS NOTES
CVTS Brief Note    Pt scheduled for surgery with Dr. Guzman (CABG) tomorrow, 5/11/25 at 07:30. Preoperative orders in place under signed and held for nursing to release.     NPO after midnight  Preop imaging ordered (carotid US, vein mapping)  Preop labs ordered (UA, type and screen)  Hold heparin 4 hours prior to OR      KOMAL Martinez, ACNPC-AG, CCRN  Nurse Practitioner  Cardiothoracic Surgery

## 2025-05-10 NOTE — PROGRESS NOTES
Summary: NSTEMI; Occlusive CAD  Orientation: A&O x 4; [place]  ACHS: 145  Vitals/Tele: VSS on RA  IV Access/drains: PIV infusing Heparin @ 400 units  Diet: NPO as of 0806  Mobility: SBA  GI/: Continent of B&B  Wound/Skin: Scattered bruising BUE; Coccyx  Consults: Cardiology  Discharge Plan: Transfer to [facility] Cath Lab  Plan: ASA; Heparin @ 400U; Statin; Metoprolol; Hydralazine PRN  See Flow sheets for assessment   Afebrile; 148/77; 60 - 120s

## 2025-05-10 NOTE — ED TRIAGE NOTES
BIBA from home for Right arm and chest pain for last 6 weeks. Has been seen multiple times for same. Took 4 baby aspirin PTA.

## 2025-05-10 NOTE — H&P
"ICU H&P  Date of Service: 05/10/25    Assessment and Plan:  Mikayla Sotomayor is a 84 year old female with a history of hypertension, CKD stage 3, hyperlipidemia, and type 2 diabetes mellitus who presents with NSTEMI. She went to the cath lab with Dr. Dent on 5/10/2025 and had IABP placed. A CABG is planned for 5/11/2025. The patient was admitted to the ICU for medical management.    Neuro:  Post-procedure pain management  - PRN Tylenol, oxycodone, and Dilaudid    CV:  NSTEMI  Multivessel CAD  Hypertension  Hyperlipidemia  - MAP goal > 65, currently not requiring vasopressors  - Continue heparin drip  - Cardiology following, appreciate recommendations  - CVTS consulted, plan for CABG on 5/11/2025 with Dr. Gumzan  - Continue metoprolol, statin, and aspirin    Pulm:  No acute issues  - Maintain O2 saturation >92%    GI:  No acute issues  - Regular diet, then NPO at midnight for surgery    Renal:  History of CKD stage 3  - Monitor I/O, external catheter in place  - Avoid nephrotoxic agents  - Daily BMP ordered    ID:  No acute issues  - Monitor leukocytosis trend and fever    Heme:  Leukocytosis, likely reactive  - Daily CBC ordered    Endo:  Type 2 diabetes mellitus  Risk for stress hyperglycemia  - POCs with meals and at bedtime  - Maintain glucose <180  - Updated hemoglobin A1c ordered (last 6.7% in 2024)    PPx:  1. DVT: heparin drip   2. VAP: HOB 30 degrees, chlorhexidine rinse  3. Stress Ulcer: N/A  4. Restraints: N/A  5. Wound care - per unit routine   6. Feeding - Regular diet, NPO at midnight    Clinically Significant Risk Factors Present on Admission                 # Drug Induced Platelet Defect: home medication list includes an antiplatelet medication   # Hypertension: Noted on problem list  # Chronic heart failure with reduced ejection fraction: last echo with EF <40%          # Overweight: Estimated body mass index is 26.86 kg/m  as calculated from the following:    Height as of 5/9/25: 1.499 m (4' 11\").    " Weight as of 5/9/25: 60.3 kg (133 lb).                    Dispo: ICU    HPI: Mikayla Sotomayor is a 84 year old female with a history of hypertension and type 2 diabetes mellitus who presents with NSTEMI. She went to the cath lab with Dr. Dent on 5/10/2025 and had IABP placed. A CABG is planned for 5/11/2025. The patient was admitted to the ICU for medical management.    Reportedly, the patient has had some arm pain that comes and goes, but developed chest pain over the last couple of days. It worsened last night and she was also nauseated, vomiting, and had diaphoresis. She presented to the ED and was found to have ST depression on EKG and thus was started on a heparin drip. She was also hypertensive.    Currently, she denies any pain. No shortness of breath. Just feels chilly.    /60   Pulse 51   Resp 17   SpO2 100%     Resp: 17    No intake or output data in the 24 hours ending 05/10/25 1605    Physical Exam:  Constitutional: healthy, alert, and no distress  Head: Normocephalic. No masses, lesions, tenderness or abnormalities  Neck: Neck supple. No adenopathy.   ENT: No neck nodes or sinus tenderness  Cardiovascular: RRR. No murmurs, clicks gallops or rub. IABP in place to right groin.  Respiratory: Good diaphragmatic excursion. Lungs clear.  Gastrointestinal: Abdomen soft, non-tender. BS normal. No masses, organomegaly  : Deferred  Musculoskeletal: No gross deformities noted, gait normal, and normal muscle tone  Skin: Seborrheic keratoses scattered diffusely over the back. No suspicious lesions or rashes.  Neurologic: Aox4. No deficits noted.  Psychiatric: Mentation appears normal and affect normal/bright.    ______________________________________________    Labs: reviewed.     CBC RESULTS:   Recent Labs   Lab Test 05/10/25  0742   WBC 12.4*   RBC 4.23   HGB 12.9   HCT 38.9   MCV 92   MCH 30.5   MCHC 33.2   RDW 13.8        Last Comprehensive Metabolic Panel:  Lab Results   Component Value Date      05/10/2025    POTASSIUM 4.0 05/10/2025    CHLORIDE 101 05/10/2025    CO2 25 05/10/2025    ANIONGAP 12 05/10/2025     (H) 05/10/2025    BUN 31.0 (H) 05/10/2025    CR 1.68 (H) 05/10/2025    GFRESTIMATED 29 (L) 05/10/2025    LISA 9.6 05/10/2025     Troponin - 2844    ______________________________________________    Imaging: reviewed.    EXAM: CT AORTIC SURVEY W CONTRAST  LOCATION: [facility]  DATE: 5/9/2025     INDICATION: Chest pain  COMPARISON: None.  TECHNIQUE: Noncontrast CT of the chest followed by CT angiogram chest abdomen pelvis during arterial phase of injection of IV contrast. 2D and 3D MIP reconstructions were performed by the CT technologist. Dose reduction techniques were used.   CONTRAST: 72mL Isovue 370     FINDINGS:   CT ANGIOGRAM CHEST, ABDOMEN, AND PELVIS: Cardiomegaly. Thoracic aorta is nonaneurysmal. Classic aortic arch arterial anatomy. Severe stenosis of the right subclavian artery origin. The right brachiocephalic and common carotid arteries are widely patent.   Left common carotid artery widely patent. Left subclavian artery widely patent. No evidence of thoracic aortic injury.     Severe atherosclerotic disease of the nonaneurysmal abdominal aorta. Separate origins of the common hepatic and splenic artery from the aorta, both vessels patent. SMA is patent. Single left and duplicated right renal arteries are patent. RUT is patent.  Bilateral iliac and visualized proximal femoral arteries patent.     LUNGS AND PLEURA: Moderate interstitial thickening throughout the bilateral lungs. Trace bilateral pleural effusions. Bronchovascular thickening. Findings compatible with pulmonary edema/fluid overload..     MEDIASTINUM/AXILLAE: No lymphadenopathy     CORONARY ARTERY CALCIFICATION: Severe.     HEPATOBILIARY: Normal.     PANCREAS: Normal.     SPLEEN: Normal.     ADRENAL GLANDS: Normal.     KIDNEYS/BLADDER: Moderate left renal parenchymal atrophy. No  hydronephrosis.     BOWEL: Normal.     LYMPH NODES: No lymphadenopathy     PELVIC ORGANS: No pelvic masses     MUSCULOSKELETAL: Degenerative changes in the lumbar spine. No acute fracture or destructive bone lesion.                                                                IMPRESSION:  1.  Findings compatible with moderate interstitial pulmonary edema/fluid overload.  2.  No acute vascular findings in the chest, abdomen, or pelvis.  3.  Heart is enlarged.  4.  Severe stenosis of the right subclavian artery origin    ______________________________________________    Echocardiogram, 5/9/2025: Interpretation Summary     1. The left ventricle is normal in size. The visual ejection fraction is  estimated at 35%. Distal anteroseptal, anterolateral, inferior, and apical  hypokinesis.  2. The right ventricle is normal in structure, function and size.  3. There is mild to moderate (1-2+) mitral regurgitation.  4. There is mild to moderate (1-2+) tricuspid regurgitation.  5. Severe (>55mmHg) pulmonary hypertension is present. The right ventricular  systolic pressure is approximated at 59mmHg plus the right atrial pressure.     Echo 2018 showed EF 60%, normal wall motion, no valve disease.     ______________________________________________    Cardiac Catheterization, 5/10/2025:     2nd Mrg lesion is 80% stenosed.    Mid LAD to Dist LAD lesion is 70% stenosed.    Ost LM to Dist LM lesion is 99% stenosed.    RPDA lesion is 40% stenosed.    Ost RCA to Prox RCA lesion is 50% stenosed.     Severe multivessel coronary artery disease  Severe ostial-distal left main heavily calcified coronary artery disease  Severe mid-distal LAD obstructive coronary artery disease  Severe obstructive coronary artery disease of a large OM 2 which supplies most of the lateral wall  Moderate coronary artery disease of the RCA and RPDA  Consultation sent to cardiothoracic surgery for evaluation for bypass candidacy.  Successful, uncomplicated right  common femoral arterial access with placement of a balloon pump through 7 Tristanian sheath.  This sheath was sutured into place.  Insertion of intra-aortic balloon pump, position confirmed appropriate and saved on fluoroscopy.       Past Medical History  Past Medical History:   Diagnosis Date    Essential hypertension, benign     Fam hx-cardiovas dis NEC     Family history of diabetes mellitus     Mitral valve disorders(424.0) 3/5/2014    Under care of  Dr Richelle Toledo MD, consultant in cardiology     Osteopenia     Premature atrial complexes 3/5/2014    Under care of  Dr Richelle Toledo MD, consultant in cardiology       Surgical History   Past Surgical History:   Procedure Laterality Date    DRAINAGE OF HEMATOMA/FLUID  1980    TEST NOT FOUND  March 03    Cardiology    Thal. stess test  reported to the patient as normal    ZZC ANESTH,SURG BREAST RECONSTRUCTIVE      L breast    ZZC APPENDECTOMY      age 5    ZZC C-SEC ONLY,PREV C-SEC  1965-68    ZZC DISCISSION,2ND CATARACT,INCIS  1/2008    ZZC EYE EXAM ESTABLISHED PT  2012    ZZHC COLONOSCOPY THRU STOMA, DIAGNOSTIC  1/2007      Family History   I have reviewed this patient's family history and updated it with pertinent information if needed.  Family History   Problem Relation Age of Onset    Hypertension Mother     Gastrointestinal Disease Mother         diverticulosis    Diabetes Father     Heart Disease Father     Hypertension Sister     Diabetes Brother     Breast Cancer No family hx of     Cancer - colorectal No family hx of        Social History   Social History     Tobacco Use    Smoking status: Never    Smokeless tobacco: Never   Substance Use Topics    Alcohol use: Yes     Alcohol/week: 0.0 standard drinks of alcohol     Comment: rare     Drug use: No       Discussed with Dr. Guzman.    Mely Saini MD  Surgical Critical Care Fellow

## 2025-05-10 NOTE — PHARMACY-ADMISSION MEDICATION HISTORY
Admission medication history completed at [facility]. Please see Pharmacist Admission Medication History note from 05/10/2025.

## 2025-05-10 NOTE — PRE-PROCEDURE
GENERAL PRE-PROCEDURE:   Procedure:  Coronary angiogram with possible intervention  Date/Time:  5/10/2025 12:23 PM    Verbal consent obtained?: Yes    Written consent obtained?: No    Emergent situation    Risks and benefits: Risks, benefits and alternatives were discussed    Consent given by:  Patient  Patient states understanding of procedure being performed: Yes    Patient's understanding of procedure matches consent: Yes    Procedure consent matches procedure scheduled: Yes    Expected level of sedation:  Moderate  Appropriately NPO:  Yes  ASA Class:  3  Mallampati  :  Grade 3- soft palate visible, posterior pharyngeal wall not visible  Lungs:  Lungs clear with good breath sounds bilaterally  Heart:  Normal heart sounds and rate  History & Physical reviewed:  History and physical reviewed and no updates needed  Statement of review:  I have reviewed the lab findings, diagnostic data, medications, and the plan for sedation

## 2025-05-10 NOTE — PROGRESS NOTES
Cardiology Update    84-year-old woman admitted in the setting of an NSTEMI, found to have multivessel disease including a 99% left main lesion, 70% mid to distal LAD, 80% second OM.  Intra-aortic balloon pump placed through the right femoral artery.  CV surgery consulted with plans for CABG tomorrow.  TTE showing LVEF around 35%.    Post angiogram admitted to the ICU.  She is hemodynamically stable without any pressor requirements, balloon pump set one-to-one.  She has had no recurrence of chest pain.  Right groin access site without hematoma.  Distal pulse is dopplerable.      Plan:  -Tentative plan for CABG tomorrow  -In the interim, maintain heparin, IABP at 1:1

## 2025-05-10 NOTE — PLAN OF CARE
[facility]    ED Boarding Nurse Handoff Addendum Report:    Date/time: 5/10/2025, 5:52 AM    Neuro: Alert and Oriented x4  Activity: have not been out of bed yet  Telemetry Monitoring: Yes - normal sinus rhythm with ST depression  Pain: denying chest pain post nitro.  Labs / Tests: Trop 654, 1550, 2844  : Purewick in place  LDA's: Peripheral  Fluids: has Heparin running at 400units per hour.  Diet: 2 gram sodium  Consults: Cardiology    Plan of Care:    Tele. Trops Q2. Echo in AM. Nitro PRN for chest pain.     Vital signs (within last 30 minutes):    Vitals:    05/09/25 2230 05/09/25 2300 05/09/25 2325 05/09/25 2346   BP: (!) 141/78 (!) 143/77 (!) 146/79 (!) 148/85   Pulse: 94 88 84 86   Resp: 20      Temp:       TempSrc:       SpO2: 100% 99% 100% 100%   Weight:       Height:           ED Boarding Nurse name: Beatrice Tillman RN

## 2025-05-10 NOTE — ED PROVIDER NOTES
"  Emergency Department Note      History of Present Illness     Chief Complaint   Chest Pain      HPI   Mikayla Sotomayor is a 84 year old female with history of hypertension and type 2 diabetes who presents for chest pain and right arm pain. Patient reports episodes of right arm pain for the past 6 weeks. Episodes are intermittent and last 10-15 minutes. She describes the arm pain as dull and compares it to a charley horse. The pain sometimes radiates to her chest during these episodes of arm pain. She describes the chest pain as tightness across her chest. Her daughter states she has been seen by multiple providers for this issue, but the pain persists. Today, patient has had an episode of arm and chest pain since . She states \"my heart is beating so fast\". Her daughter also reports increased general weakness tonight. No arm swelling. No abdominal pain. No recent chills, fever, or illness. No major cardiac history. Patient took 4 baby aspirins prior to arrival. She takes hypertension medications 2x/day and took her dose this morning.     Independent Historian   Daughter, son, and  assist with the history.    Review of External Notes   None    Past Medical History     Medical History and Problem List   Hypertension  Type 2 diabetes  Mitral valve disorders  CKD stage 3  Mixed hyperlipidemia  Disorder of bone and cartilage  Premature atrial complexes  Diffuse cystic mastopathy     Medications   Hydralazine   Hydrodiuril  Vasotec  Aspirin 81mg    Surgical History   Drainage of hematoma/fluid  Breast reconstructive  Appendectomy    Colonoscopy   Cataract    Physical Exam     Patient Vitals for the past 24 hrs:   BP Temp Temp src Pulse Resp SpO2 Height Weight   25 2346 (!) 148/85 -- -- 86 -- 100 % -- --   25 2325 (!) 146/79 -- -- 84 -- 100 % -- --   25 2300 (!) 143/77 -- -- 88 -- 99 % -- --   25 2230 (!) 141/78 -- -- 94 20 100 % -- --   25 2200 (!) 147/75 -- -- 90 21 100 " "% -- --   05/09/25 2140 (!) 150/75 -- -- 89 14 99 % -- --   05/09/25 2115 (!) 145/80 -- -- 102 17 97 % -- --   05/09/25 2100 (!) 141/84 -- -- (!) 126 24 98 % -- --   05/09/25 2045 -- -- -- 118 24 (!) 91 % -- --   05/09/25 2030 (!) 176/96 -- -- 110 10 -- -- --   05/09/25 2000 (!) 170/101 -- -- 116 26 98 % -- --   05/09/25 1939 (!) 187/100 98.5  F (36.9  C) Temporal 117 18 99 % 1.499 m (4' 11\") 60.3 kg (133 lb)     Physical Exam  General: Alert, appears uncomfortable 2/2 right arm pain  Neuro:  No focal deficits. SILT to median/ulnar/radial nerve distribution.   HEENT:  Moist mucous membranes.  Conjunctiva normal.   CV:  Tachycardic, regular rhythm, no m/r/g, skin warm and well perfused; 2+ bilateral radial pulses  Pulm:  CTAB, no wheezes/ronchi/rales.  No acute distress, breathing comfortably  GI:  Soft, nontender, nondistended.  No rebound or guarding.    MSK:  Moving all extremities.  No focal areas of edema, erythema  Right arm:  No apparent edema.  No pain with elbow flexion/extension or with PROM of the right shoulder.  No skin changes/erythema.    Skin:  WWP, no rashes, no lower extremity edema, skin color normal, no diaphoresis    Diagnostics     Lab Results   Labs Ordered and Resulted from Time of ED Arrival to Time of ED Departure   TROPONIN T, HIGH SENSITIVITY - Abnormal       Result Value    Troponin T, High Sensitivity 126 (*)    COMPREHENSIVE METABOLIC PANEL - Abnormal    Sodium 141      Potassium 4.5      Carbon Dioxide (CO2) 23      Anion Gap 14      Urea Nitrogen 26.8 (*)     Creatinine 1.66 (*)     GFR Estimate 30 (*)     Calcium 10.0      Chloride 104      Glucose 145 (*)     Alkaline Phosphatase 67      AST 31      ALT 11      Protein Total 7.5      Albumin 4.5      Bilirubin Total 0.2     CBC WITH PLATELETS AND DIFFERENTIAL - Abnormal    WBC Count 11.2 (*)     RBC Count 4.42      Hemoglobin 13.5      Hematocrit 40.7      MCV 92      MCH 30.5      MCHC 33.2      RDW 13.7      Platelet Count 246   "    % Neutrophils 71      % Lymphocytes 14      % Monocytes 9      % Eosinophils 6      % Basophils 1      % Immature Granulocytes 0      NRBCs per 100 WBC 0      Absolute Neutrophils 8.0      Absolute Lymphocytes 1.5      Absolute Monocytes 1.0      Absolute Eosinophils 0.6      Absolute Basophils 0.1      Absolute Immature Granulocytes 0.0      Absolute NRBCs 0.0     NT-PROBNP - Abnormal    NT-proBNP 1,462 (*)    TROPONIN T, HIGH SENSITIVITY - Abnormal    Troponin T, High Sensitivity 654 (*)    CBC WITH PLATELETS - Abnormal    WBC Count 12.7 (*)     RBC Count 4.02      Hemoglobin 12.2      Hematocrit 37.0      MCV 92      MCH 30.3      MCHC 33.0      RDW 13.5      Platelet Count 201     GLUCOSE BY METER - Abnormal    GLUCOSE BY METER POCT 123 (*)    TROPONIN T, HIGH SENSITIVITY - Abnormal    Troponin T, High Sensitivity 1,550 (*)    MAGNESIUM - Normal    Magnesium 2.1     RBC AND PLATELET MORPHOLOGY    RBC Morphology Confirmed RBC Indices      Platelet Assessment        Value: Automated Count Confirmed. Platelet morphology is normal.   GLUCOSE MONITOR NURSING POCT   GLUCOSE MONITOR NURSING POCT   GLUCOSE MONITOR NURSING POCT   GLUCOSE MONITOR NURSING POCT   GLUCOSE MONITOR NURSING POCT   TROPONIN T, HIGH SENSITIVITY   HEPARIN UNFRACTIONATED ANTI XA LEVEL       Imaging   CT Aortic Survey w Contrast   Final Result   IMPRESSION:   1.  Findings compatible with moderate interstitial pulmonary edema/fluid overload.   2.  No acute vascular findings in the chest, abdomen, or pelvis.   3.  Heart is enlarged.   4.  Severe stenosis of the right subclavian artery origin             EKG   ECG results from 05/09/25   EKG 12 lead     Value    Systolic Blood Pressure     Diastolic Blood Pressure     Ventricular Rate 116    Atrial Rate 116    WA Interval 146    QRS Duration 98        QTc 450    P Axis 82    R AXIS 8    T Axis 152    Interpretation ECG      Sinus tachycardia with Premature atrial complexes with Aberrant  conduction  Left ventricular hypertrophy with repolarization abnormality ( R in aVL , Rohan product )  Abnormal ECG  When compared with ECG of 12-Jul-2013 10:25, (unconfirmed)  Premature ventricular complexes are no longer Present  Questionable change in QRS axis  ST now depressed in Inferior leads  ST more depressed Lateral leads  Nonspecific T wave abnormality no longer evident in Anterior leads  T wave inversion now evident in Lateral leads  Interpreted by Dr. Jones at 1935     EKG 12 lead     Value    Systolic Blood Pressure     Diastolic Blood Pressure     Ventricular Rate 120    Atrial Rate 120    NY Interval 154    QRS Duration 100        QTc 477    P Axis 61    R AXIS 20    T Axis 155    Interpretation ECG      Sinus tachycardia with occasional Premature ventricular complexes  Left ventricular hypertrophy with repolarization abnormality  Abnormal ECG  When compared with ECG of 09-May-2025 19:33, (unconfirmed)  Premature ventricular complexes are now Present  Aberrant conduction is no longer Present  ST more depressed Anterolateral leads  T wave inversion more evident in Lateral leads  Interpreted by Dr. Jones at 2040.       ECG taken at 2134, ECG read at 2140  Sinus rhythm. Left ventricular hypertrophy with repolarization abnormality ( Rohan product ). Marked ST abnormality, possible anterior subendocardial injury. QTcB >= 480 msec. Abnormal ECG. Premature ventricular complexes are no longer Present. ST less depressed in Inferior leads. T wave inversion less evident in Lateral leads.  Rate 89 bpm. NY interval 152 ms. QRS duration 100 ms. QT/QTc 396/481 ms. P-R-T axes 45 21 129.    Independent Interpretation   None    ED Course      Medications Administered   Medications   aspirin EC tablet 81 mg (has no administration in time range)   B-12 TBCR 1,000 mcg (has no administration in time range)   hydrALAZINE (APRESOLINE) tablet 10 mg (has no administration in time range)   hydrOXYzine HCl (ATARAX)  tablet 10 mg (has no administration in time range)   lidocaine 1 % 0.1-1 mL (has no administration in time range)   lidocaine (LMX4) cream (has no administration in time range)   sodium chloride (PF) 0.9% PF flush 3 mL (3 mLs Intracatheter $Given 5/9/25 2333)   sodium chloride (PF) 0.9% PF flush 3 mL (has no administration in time range)   senna-docusate (SENOKOT-S/PERICOLACE) 8.6-50 MG per tablet 1 tablet (has no administration in time range)     Or   senna-docusate (SENOKOT-S/PERICOLACE) 8.6-50 MG per tablet 2 tablet (has no administration in time range)   calcium carbonate (TUMS) chewable tablet 1,000 mg (has no administration in time range)   atorvastatin (LIPITOR) tablet 10 mg (10 mg Oral $Given 5/9/25 2346)   metoprolol tartrate (LOPRESSOR) tablet 25 mg (25 mg Oral $Given 5/9/25 2346)   Patient is already receiving anticoagulation with heparin, enoxaparin (LOVENOX), warfarin (COUMADIN)  or other anticoagulant medication (has no administration in time range)   polyethylene glycol (MIRALAX) Packet 17 g (has no administration in time range)   bisacodyl (DULCOLAX) suppository 10 mg (has no administration in time range)   ondansetron (ZOFRAN ODT) ODT tab 4 mg (has no administration in time range)     Or   ondansetron (ZOFRAN) injection 4 mg (has no administration in time range)   prochlorperazine (COMPAZINE) injection 5 mg (has no administration in time range)     Or   prochlorperazine (COMPAZINE) tablet 5 mg (has no administration in time range)   acetaminophen (TYLENOL) tablet 650 mg (has no administration in time range)     Or   acetaminophen (TYLENOL) Suppository 650 mg (has no administration in time range)   HYDROmorphone (DILAUDID) half-tab 1 mg (has no administration in time range)   HYDROmorphone (DILAUDID) tablet 2 mg (has no administration in time range)   HYDROmorphone (DILAUDID) injection 0.2 mg (has no administration in time range)   HYDROmorphone (DILAUDID) injection 0.4 mg (has no administration in  time range)   melatonin tablet 1 mg (has no administration in time range)   heparin 25,000 units in 0.45% NaCl 250 mL ANTICOAGULANT infusion (700 Units/hr Intravenous Rate/Dose Verify 5/10/25 0059)   glucose gel 15-30 g (has no administration in time range)     Or   dextrose 50 % injection 25-50 mL (has no administration in time range)     Or   glucagon injection 1 mg (has no administration in time range)   insulin aspart (NovoLOG) injection (RAPID ACTING) (has no administration in time range)   insulin aspart (NovoLOG) injection (RAPID ACTING) ( Subcutaneous Not Given 5/9/25 2347)   nitroGLYcerin (NITROSTAT) sublingual tablet 0.4 mg (0.4 mg Sublingual $Given 5/10/25 0059)   iopamidol (ISOVUE-370) solution 500 mL (72 mLs Intravenous $Given 5/9/25 2051)   sodium chloride 0.9 % bag for CT scan flush (60 mLs Intravenous $Given 5/9/25 2051)   heparin loading dose for LOW INTENSITY TREATMENT * Give BEFORE starting heparin infusion (3,600 Units Intravenous $Given 5/9/25 2158)   furosemide (LASIX) injection 20 mg (20 mg Intravenous $Given 5/9/25 2334)     Discussion of Management   Admitting Hospitalist, Dr. Moran  Cardiology, Dr. Haines    ED Course   ED Course as of 05/10/25 0359   Fri May 09, 2025   2015 I obtained history and examined the patient as noted above.     2134 Rechecked and updated the patient.     2141 I spoke with cardiology regarding the patient. Start her on heparin. Keep her at [place].       Additional Documentation  None    Medical Decision Making / Diagnosis     CMS Diagnoses: None    MIPS            None    MDM   Mikayla Sotomayor is a 84 year old female presenting for evaluation of intermittent right arm pain radiating to her chest for the last 6 weeks.  Tonight, it was very intense and did not go away with usual therapies at home.  Initially, patient hypertensive to 187/100, tachycardic.  She has no evidence of bony tenderness or overlying skin changes concerning for infection.  She has good  distal pulses and there are no signs concerning for acute limb ischemia or compartment syndrome.  No concern at this time for DVT.  I was concerned for possible anginal equivalent, hypertensive emergengy, and also considered aortic dissection.  Fortunately, CT aortic survey without vascular abnormality but does show new cardiomegaly and pulmonary edema.  Also noted severe stenosis of the right subclavian artery.  Initial screening EKG shows ST elevation in aVR with diffuse ST depression.  Initial troponin elevated at 126, delta troponin 654.  Discussed patient and EKG findings with cardiology who reviewed the EKGs and were not concerned for STEMI; they recommended initiating treatment for acute coronary syndrome/NSTEMI with initiation of ASA and heparin and patient can stay at [facility].  Patient was chest pain-free after single dose of pain meds.  Patient had taken 4 baby aspirin prior to arrival and she was initiated on heparin after discussion with patient and family and overall findings.  They were agreeable to admission.  I discussed patient with hospitalist service here who accepted the patient for admission.    Total critical care Emergency physician time in direct patient evaluation and management of this patient, exclusive of procedures:  37 minutes    Disposition   The patient was admitted to the hospital.     Diagnosis     ICD-10-CM    1. NSTEMI (non-ST elevated myocardial infarction) (H)  I21.4       2. Pulmonary edema, acute (H)  J81.0       3. Subclavian artery stenosis, right  I77.1       4. Abnormal electrocardiogram  R94.31            Scribe Disclosure:  Nani HERRERA, am serving as a scribe at 8:59 PM on 5/9/2025 to document services personally performed by Kirk Jones MD based on my observations and the provider's statements to me.        Kirk Jones MD  05/10/25 0356       Kirk Jones MD  05/10/25 0359

## 2025-05-10 NOTE — H&P
[facility]    History and Physical - Hospitalist Service       Date of Admission:  5/9/2025    Assessment & Plan      Mikayla Sotomayor is a 84 year old female admitted on 5/9/2025. She has a past medical history significant for hypertension, anxiety, and diabetes mellitus type 2.  She has been having intermittent right arm pain for the past 6 weeks.  Pain seems to be getting worse over time.  Developed central chest pain 2 days ago.  Presented to emergency room due to continued intermittent arm and chest pain.  Found to have acute coronary syndrome.    Acute coronary syndrome.  -Initial troponin elevated at 126.  -Significant ST changes on EKG.  -Case discussed with cardiology, Dr. Haines, by ER provider.  -Cardiology recommended to treat this as a NSTEMI with medical management for now.  -Continue IV heparin infusion started in the ER.  -Aspirin 81 mg a day.  -Start atorvastatin 10 mg a day.  -Check lipid panel.  -Had an initial dose of IV labetalol in the ER.  -Start metoprolol 25 mg twice a day.  -Monitor on telemetry.  -Check repeat troponin level.  -Check echocardiogram tomorrow.  -Formal cardiology consult ordered.    Pulmonary edema.  Suspected acute heart failure.  - N-terminal proBNP elevated at 1460.  - Evidence of pulmonary edema on CT scan.  - Evidence of fluid overload on exam.  - Give furosemide 20 mg IV once.  - Weigh daily.  - Strict intake and output monitoring.  - Check echocardiogram tomorrow.    Severe right subclavian artery stenosis.  -Noted on CT aortogram.  - Would recommend vascular medicine consult.  -Hold off on vascular medicine consult order pending further cardiology workup.    Acute kidney injury on chronic kidney disease stage IIIb.  - Creatinine elevated at 1.7.  - Baseline appears to be near 1.3.  - Hold enalapril for tonight.  - Avoid nephrotoxins as able.  - Recheck metabolic panel tomorrow.    Diabetes mellitus type 2.  - Normally diet controlled.  - Start  "aspart insulin sliding scale for now.              Diet: Combination Diet Low Saturated Fat Na <2400mg Diet, No Caffeine Diet    DVT Prophylaxis: Heparin SQ  [place] Catheter: Not present  Lines: None     Cardiac Monitoring: ACTIVE order. Indication: AMI (NSTEMI/ STEMI) (48 hours)  Code Status: Full Code      Clinically Significant Risk Factors Present on Admission                 # Drug Induced Platelet Defect: home medication list includes an antiplatelet medication   # Hypertension: Noted on problem list           # Overweight: Estimated body mass index is 26.86 kg/m  as calculated from the following:    Height as of this encounter: 1.499 m (4' 11\").    Weight as of this encounter: 60.3 kg (133 lb).              Disposition Plan     Medically Ready for Discharge: Anticipated in 2-4 Days           Derek Moran DO  Hospitalist Service  [facility]  Securely message with [employer] (more info)  Text page via [employer] Paging/Directory     ______________________________________________________________________    Chief Complaint   Right arm pain and chest pain.    History is obtained from the patient, family members at bedside, and ER provider.    History of Present Illness   Mikayla Sotomayor is a 84 year old female who has a past medical history significant for hypertension, anxiety, and diabetes mellitus type 2.  She has been having right arm pain intermittently for the past 6 weeks.  Has been working with doctors to try to find cause of arm pain.  Has not yet had a clear diagnosis on why she is having intermittent right arm pain.  Has been taking pain medications occasionally.  Has not noticed anything in particular that causes pain to happen.  Nothing particular that takes pain away.  She does have some associated right upper chest pain at times with the right arm pain.  She developed central chest pain 2 days ago.  Central chest pain is different than the right arm and right chest pain.  She " also has not noticed anything in particular causes the central chest pain.  She has been feeling a bit short of breath over the past couple of days at times.  Had not previously been noticing shortness of breath with the right arm and right upper chest pain.  Does not remember having symptoms like this previously.  No other acute complaints.  Chest pain is currently resolved.      Past Medical History    Past Medical History:   Diagnosis Date    Essential hypertension, benign     Fam hx-cardiovas dis NEC     Family history of diabetes mellitus     Mitral valve disorders(424.0) 3/5/2014    Under care of  Dr Richelle Toledo MD, consultant in cardiology     Osteopenia     Premature atrial complexes 3/5/2014    Under care of  Dr Richelle Toledo MD, consultant in cardiology        Past Surgical History   Past Surgical History:   Procedure Laterality Date    DRAINAGE OF HEMATOMA/FLUID      TEST NOT FOUND      Cardiology    Thal. stess test  reported to the patient as normal    Z ANESTH,SURG BREAST RECONSTRUCTIVE      L breast    Z APPENDECTOMY      age 5    ZZC C-SEC ONLY,PREV C-SEC  -    Z DISCISSION,2ND CATARACT,INCIS  2008    [facility]      [facility]  [facility]       Prior to Admission Medications   Prior to Admission Medications   Prescriptions Last Dose Informant Patient Reported? Taking?   ASPIRIN 81 MG OR TABS   No No   Si tab po QD (Once per day)   Calcium-Magnesium-Vitamin D (CALCIUM MAGNESIUM PO)   Yes No   Sig: Take 2 oz by mouth daily.   Cyanocobalamin (B-12) 1000 MCG TBCR   Yes No   Sig: Take 1,000 mcg by mouth daily   FLAXSEED, LINSEED, PO   Yes No   Sig: Take 1 tablet by mouth 2 times daily.   Patient not taking: Reported on 2025   Multiple Vitamins-Minerals (MULTIVITAMIN & MINERAL) LIQD   Yes No   Sig: Take 2 oz by mouth daily.   VITAMIN D, CHOLECALCIFEROL, PO   Yes No   Sig: Take 5 drops by mouth daily.   enalapril  (VASOTEC) 20 MG tablet   No No   Sig: Take 1 tablet (20 mg) by mouth 2 times daily.   hydrALAZINE (APRESOLINE) 10 MG tablet   No No   Sig: Take 1 tablet (10 mg) by mouth 3 times daily.   hydrOXYzine HCl (ATARAX) 10 MG tablet   No No   Sig: Take 1 tablet (10 mg) by mouth 2 times daily as needed for itching or anxiety.   hydrochlorothiazide (HYDRODIURIL) 25 MG tablet   No No   Sig: TAKE 1/2 TABLET BY MOUTH DAILY      Facility-Administered Medications: None        Allergies   Allergies   Allergen Reactions    Propoxyphene Hcl      darvon-tylenol #3        Physical Exam   Vital Signs: Temp: 98.5  F (36.9  C) Temp src: Temporal BP: (!) 147/75 Pulse: 90   Resp: 21 SpO2: 100 % O2 Device: Nasal cannula Oxygen Delivery: 2 LPM  Weight: 133 lbs 0 oz    Gen:  NAD, A&Ox2 to person and place.  Trouble with time.  Eyes:  PERRL, sclera anicteric.  OP:  MMM, no lesions.  Neck:  Supple.  CV:  Regular, no loud murmurs.  Lung: Crackles at bases b/l, normal effort.  Ab:  +BS, soft.  Skin:  Warm, dry to touch.  No rash.  Ext:  1+ pitting edema LE b/l.      Medical Decision Making       85 MINUTES SPENT BY ME on the date of service doing chart review, history, exam, documentation & further activities per the note.      Data     I have personally reviewed the following data over the past 24 hrs:    11.2 (H)  \   13.5   / 246     141 104 26.8 (H) /  145 (H)   4.5 23 1.66 (H) \     ALT: 11 AST: 31 AP: 67 TBILI: 0.2   ALB: 4.5 TOT PROTEIN: 7.5 LIPASE: N/A     Trop: 126 (HH) BNP: 1,462 (H)       Imaging results reviewed over the past 24 hrs:   Recent Results (from the past 24 hours)   CT Aortic Survey w Contrast    Narrative    EXAM: CT AORTIC SURVEY W CONTRAST  LOCATION: [facility]  DATE: 5/9/2025    INDICATION: Chest pain  COMPARISON: None.  TECHNIQUE: Noncontrast CT of the chest followed by CT angiogram chest abdomen pelvis during arterial phase of injection of IV contrast. 2D and 3D MIP reconstructions were performed  by the CT technologist. Dose reduction techniques were used.   CONTRAST: 72mL Isovue 370    FINDINGS:   CT ANGIOGRAM CHEST, ABDOMEN, AND PELVIS: Cardiomegaly. Thoracic aorta is nonaneurysmal. Classic aortic arch arterial anatomy. Severe stenosis of the right subclavian artery origin. The right brachiocephalic and common carotid arteries are widely patent.   Left common carotid artery widely patent. Left subclavian artery widely patent. No evidence of thoracic aortic injury.    Severe atherosclerotic disease of the nonaneurysmal abdominal aorta. Separate origins of the common hepatic and splenic artery from the aorta, both vessels patent. SMA is patent. Single left and duplicated right renal arteries are patent. RUT is patent.  Bilateral iliac and visualized proximal femoral arteries patent.    LUNGS AND PLEURA: Moderate interstitial thickening throughout the bilateral lungs. Trace bilateral pleural effusions. Bronchovascular thickening. Findings compatible with pulmonary edema/fluid overload..    MEDIASTINUM/AXILLAE: No lymphadenopathy    CORONARY ARTERY CALCIFICATION: Severe.    HEPATOBILIARY: Normal.    PANCREAS: Normal.    SPLEEN: Normal.    ADRENAL GLANDS: Normal.    KIDNEYS/BLADDER: Moderate left renal parenchymal atrophy. No hydronephrosis.    BOWEL: Normal.    LYMPH NODES: No lymphadenopathy    PELVIC ORGANS: No pelvic masses    MUSCULOSKELETAL: Degenerative changes in the lumbar spine. No acute fracture or destructive bone lesion.      Impression    IMPRESSION:  1.  Findings compatible with moderate interstitial pulmonary edema/fluid overload.  2.  No acute vascular findings in the chest, abdomen, or pelvis.  3.  Heart is enlarged.  4.  Severe stenosis of the right subclavian artery origin

## 2025-05-10 NOTE — CONSULTS
[facility]    CARDIOLOGY CONSULT    Date of Admission:  5/9/2025  Date of Consult: May 10, 2025    ASSESSMENT:  84-year-old female seen for non-STEMI.  She had some chest and arm symptoms over the past few weeks, but yesterday had more intense chest pain with nausea and emesis, this is very consistent with anginal pain and acute coronary syndrome.  She had some ST elevation in lead aVR with diffuse ST depression up to 3 to 4 mm with horizontal slightly downsloping depression.  There is a high suspicion for occlusive coronary disease.  Echo shows EF 40% with wall motion abnormalities.  Overall finding consistent with high risk non-STEMI.  Coronary angiogram was recommended, the risk and benefit of the procedure was explained in detail.  She has no bleeding history or significant allergies.  She does have CKD stage III and we talked about the risk of contrast-induced nephropathy, she understands these risks.  It was explained that not doing angiogram would likely be higher risk than then doing it.    She will be continued on medical therapy including heparin drip, she is already diuresed some with furosemide.  IVC is normal, but will give 1 more small dose of furosemide.    Of note chest CT showed right subclavian stenosis which may affect angiogram access.    RECOMMENDATIONS:  1.  Non-STEMI, high probability of occlusive CAD  - Continue aspirin, heparin drip, statin, metoprolol  - As needed hydralazine given for hypertension  - Coronary angiogram, will discuss timing with [facility] Cath Lab    Tyree Hartley MD  Cardiology - [facility] Heart  Pager:  [phone]  Text Page  May 10, 2025    CODE STATUS:  Full Code    REASON FOR CONSULT: Non-STEMI    PRIMARY CARE PHYSICIAN:  Paige Higginbotham    HISTORY OF PRESENT ILLNESS:  84 year old female seen for non-STEMI.  She has hypertension, diabetes type 2, CKD with baseline creatinine around 1.3.    At baseline she does some light activity, but nothing too  strenuous.  She lives with her spouse in a house and is quite independent.    The past 2 weeks she has had some arm pain that comes and goes somewhat randomly, not necessarily with exertion.  Over the past several days she has developed a midsternal chest discomfort fairly low-grade, this again seems to come on randomly, but can be worse with exertion.  She denies lightheadedness, dizziness, or edema.    Yesterday around 5 PM she had fairly severe midsternal chest pain with associated nausea, diaphoresis, and emesis.  She presented to the ED because of the symptoms.  She was in sinus tachycardia.  EKG had some ST depression, she was started on heparin, she was somewhat hypertensive.  Overnight her pain waxed and waned, but is quite minimal this morning.    This morning she is very tired, she has a mild dull ache in her chest, but pain is much better than yesterday.  Her breathing is okay.    PAST MEDICAL HISTORY:  I have reviewed this patient's medical history and updated it with pertinent information if needed.   Past Medical History:   Diagnosis Date    Essential hypertension, benign     Fam hx-cardiovas dis NEC     Family history of diabetes mellitus     Mitral valve disorders(424.0) 3/5/2014    Under care of  Dr Richelle Toledo MD, consultant in cardiology     Osteopenia     Premature atrial complexes 3/5/2014    Under care of  Dr Richelle Toledo MD, consultant in cardiology        PAST SURGICAL HISTORY:  I have reviewed this patient's surgical history and updated it with pertinent information if needed.  Past Surgical History:   Procedure Laterality Date    DRAINAGE OF HEMATOMA/FLUID  1980    TEST NOT FOUND  March 03    Cardiology    Thal. stess test  reported to the patient as normal    [facility] ANESTH,SURG BREAST RECONSTRUCTIVE      L breast    Z APPENDECTOMY      age 5    ZC C-SEC ONLY,PREV C-SEC  1965-68    Z DISCISSION,2ND CATARACT,INCIS  1/2008    [facility] EYE EXAM ESTABLISHED PT  2012    [facility] COLONOSCOPY  THRU STOMA, DIAGNOSTIC  2007       HOME MEDICATIONS:  Prior to Admission Medications   Prescriptions Last Dose Informant Patient Reported? Taking?   ASPIRIN 81 MG OR TABS   No No   Si tab po QD (Once per day)   Calcium-Magnesium-Vitamin D (CALCIUM MAGNESIUM PO)   Yes No   Sig: Take 2 oz by mouth daily.   Cyanocobalamin (B-12) 1000 MCG TBCR   Yes No   Sig: Take 1,000 mcg by mouth daily   FLAXSEED, LINSEED, PO   Yes No   Sig: Take 1 tablet by mouth 2 times daily.   Patient not taking: Reported on 2025   Multiple Vitamins-Minerals (MULTIVITAMIN & MINERAL) LIQD   Yes No   Sig: Take 2 oz by mouth daily.   VITAMIN D, CHOLECALCIFEROL, PO   Yes No   Sig: Take 5 drops by mouth daily.   enalapril (VASOTEC) 20 MG tablet   No No   Sig: Take 1 tablet (20 mg) by mouth 2 times daily.   hydrALAZINE (APRESOLINE) 10 MG tablet   No No   Sig: Take 1 tablet (10 mg) by mouth 3 times daily.   hydrOXYzine HCl (ATARAX) 10 MG tablet   No No   Sig: Take 1 tablet (10 mg) by mouth 2 times daily as needed for itching or anxiety.   hydrochlorothiazide (HYDRODIURIL) 25 MG tablet   No No   Sig: TAKE 1/2 TABLET BY MOUTH DAILY      Facility-Administered Medications: None       ALLERGIES:  Allergies   Allergen Reactions    Propoxyphene Hcl      darvon-tylenol #3       SOCIAL HISTORY:  I have reviewed this patient's social history and updated it with pertinent information if needed. Mikayla Sotomayor  reports that she has never smoked. She has never used smokeless tobacco. She reports current alcohol use. She reports that she does not use drugs.    FAMILY HISTORY:  I have reviewed this patient's family history and updated it with pertinent information if needed.   Family History   Problem Relation Age of Onset    Hypertension Mother     Gastrointestinal Disease Mother         diverticulosis    Diabetes Father     Heart Disease Father     Hypertension Sister     Diabetes Brother     Breast Cancer No family hx of     Cancer - colorectal No  family hx of        REVIEW OF SYSTEMS:  Constitutional:  No weight loss, fever, chills  HEENT:  Eyes:  No visual loss, blurred vision, double vision or yellow sclerae. No hearing loss, sneezing, congestion, runny nose or sore throat.  Skin:  No rash or itching.  Cardiovascular: per HPI  Respiratory: per HPI  GI:  No anorexia, nausea, vomiting or diarrhea. No abdominal pain or blood.  :  No dysurea, hematuria  Neurologic:  No headache, paralysis, ataxia, numbness or tingling in the extremities. No change in bowel or bladder control.  Musculoskeletal:  No muscle pain  Hematologic:  No bleeding or bruising.  Lymphatics:  No enlarged nodes. No history of splenectomy.  Endocrine:  No reports of sweating, cold or heat intolerance. No polyuria or polydipsia.  Allergies:  No history of asthma, hives, eczema or rhinitis.    PHYSICAL EXAM:  Temp: 98.2  F (36.8  C) Temp src: Oral BP: (!) 148/77 Pulse: 80   Resp: 16 SpO2: 98 % O2 Device: None (Room air) Oxygen Delivery: 2 LPM  Vital Signs with Ranges  Temp:  [98.2  F (36.8  C)-98.5  F (36.9  C)] 98.2  F (36.8  C)  Pulse:  [] 80  Resp:  [10-26] 16  BP: (141-187)/() 148/77  SpO2:  [91 %-100 %] 98 %  133 lbs 0 oz    Constitutional: awake, alert, no distress  Eyes: PERRL, sclera nonicteric  ENT: trachea midline  Respiratory: Few crackles at the bases  Cardiovascular: Regular rate and rhythm, no murmurs  GI: nondistended, nontender, bowel sounds present  Lymph/Hematologic: no lymphadenopathy  Skin: dry, no rash  Musculoskeletal: good muscle tone, strength 5/5 in upper and lower extremities  Neurologic: no focal deficits  Neuropsychiatric: appropriate affact    Intake/Output Summary (Last 24 hours) at 5/10/2025 0744  Last data filed at 5/10/2025 0059  Gross per 24 hour   Intake 21.12 ml   Output --   Net 21.12 ml     Clinically Significant Risk Factors Present on Admission                 # Drug Induced Platelet Defect: home medication list includes an antiplatelet  "medication   # Hypertension: Noted on problem list           # Overweight: Estimated body mass index is 26.86 kg/m  as calculated from the following:    Height as of this encounter: 1.499 m (4' 11\").    Weight as of this encounter: 60.3 kg (133 lb).           Not present on admission    Not present on admission    Not present on admission    Not present on admission    CKD POA List: Stage 3b (GFR 30-44)    Not present on admission    Not present on admission    DATA:  Labs: Potassium 4.5, creatinine 1.6, NT proBNP 1400, troponin 650, 1500, 2800, WBC 12, hemoglobin 12, platelets 201  Recent Labs   Lab Test 03/21/25  1200 10/09/24  1137 10/31/23  1345 10/31/23  1345 06/21/18  1041 06/21/17  1048   CHOL  --  235*  --  225* 220* 246*   HDL  --  61  --  65 47* 56   LDL  --  151*  --  133* 143* 159*   TRIG 120 117   < > 137 167* 155*   CHOLHDLRATIO  --   --   --   --  4.7 4.4    < > = values in this interval not displayed.     EKG: Serial EKGs from May 19 show sinus tachycardia, rate 115, diffuse ST depression with horizontal to slightly downsloping ST segments the varies from EKG to EKG    Tele: Sinus rhythm, rate 70s, few runs of borderline wide QRS    Echo: May 10: EF 40%, hypokinesis of the apex, mid to distal anterolateral and distal inferior segments, no LV thrombus, 1-2+ TR, RVSP 59 mmHg, 1+ MR, calcification of mitral valve with mean 3 mmHg    Chest CT May 10: Moderate pulmonary edema, severe right subclavian stenosis, severe coronary calcification          "

## 2025-05-10 NOTE — ED NOTES
"[facility]  ED Nurse Handoff Report    ED Chief complaint: Chest Pain  . ED Diagnosis:   Final diagnoses:   NSTEMI (non-ST elevated myocardial infarction) (H)   Pulmonary edema, acute (H)   Subclavian artery stenosis, right       Allergies:   Allergies   Allergen Reactions    Propoxyphene Hcl      darvon-tylenol #3       Code Status: Full Code    Activity level - Baseline/Home:  independent.  Activity Level - Current:   assist of 1.   Lift room needed: No.   Bariatric: No   Needed: No   Isolation: No.   Infection: Not Applicable.     Respiratory status: Nasal cannula    Vital Signs (within 30 minutes):   Vitals:    05/09/25 2100 05/09/25 2115 05/09/25 2140 05/09/25 2200   BP: (!) 141/84 (!) 145/80 (!) 150/75 (!) 147/75   Pulse: (!) 126 102 89 90   Resp: 24 17 14 21   Temp:       TempSrc:       SpO2: 98% 97% 99% 100%   Weight:       Height:           Cardiac Rhythm:  ,      Pain level:    Patient confused: No.   Patient Falls Risk: bed/chair alarm on, nonskid shoes/slippers when out of bed, and arm band in place.   Elimination Status: Has voided     Patient Report - Initial Complaint: Chest Pain.   Focused Assessment: Mikayla Sotomayor is a 84 year old female with history of hypertension and type 2 diabetes who presents for chest pain and right arm pain. Patient reports episodes of right arm pain for the past 6 weeks. Episodes are intermittent and last 10-15 minutes. She describes the arm pain as dull and compares it to a charley horse. The pain sometimes radiates to her chest during these episodes of arm pain. She describes the chest pain as tightness across her chest. Her daughter states she has been seen by multiple providers for this issue, but the pain persists. Today, patient has had an episode of arm and chest pain since 1730. She states \"my heart is beating so fast\". Her daughter also reports increased general weakness tonight. No arm swelling. No abdominal pain. No recent " chills, fever, or illness. No major cardiac history. Patient took 4 baby aspirins prior to arrival. She takes hypertension medications 2x/day and took her dose this morning.      Abnormal Results:   Labs Ordered and Resulted from Time of ED Arrival to Time of ED Departure   TROPONIN T, HIGH SENSITIVITY - Abnormal       Result Value    Troponin T, High Sensitivity 126 (*)    COMPREHENSIVE METABOLIC PANEL - Abnormal    Sodium 141      Potassium 4.5      Carbon Dioxide (CO2) 23      Anion Gap 14      Urea Nitrogen 26.8 (*)     Creatinine 1.66 (*)     GFR Estimate 30 (*)     Calcium 10.0      Chloride 104      Glucose 145 (*)     Alkaline Phosphatase 67      AST 31      ALT 11      Protein Total 7.5      Albumin 4.5      Bilirubin Total 0.2     CBC WITH PLATELETS AND DIFFERENTIAL - Abnormal    WBC Count 11.2 (*)     RBC Count 4.42      Hemoglobin 13.5      Hematocrit 40.7      MCV 92      MCH 30.5      MCHC 33.2      RDW 13.7      Platelet Count 246      % Neutrophils 71      % Lymphocytes 14      % Monocytes 9      % Eosinophils 6      % Basophils 1      % Immature Granulocytes 0      NRBCs per 100 WBC 0      Absolute Neutrophils 8.0      Absolute Lymphocytes 1.5      Absolute Monocytes 1.0      Absolute Eosinophils 0.6      Absolute Basophils 0.1      Absolute Immature Granulocytes 0.0      Absolute NRBCs 0.0     RBC AND PLATELET MORPHOLOGY    RBC Morphology Confirmed RBC Indices      Platelet Assessment        Value: Automated Count Confirmed. Platelet morphology is normal.   NT-PROBNP   CBC WITH PLATELETS   TROPONIN T, HIGH SENSITIVITY        CT Aortic Survey w Contrast   Final Result   IMPRESSION:   1.  Findings compatible with moderate interstitial pulmonary edema/fluid overload.   2.  No acute vascular findings in the chest, abdomen, or pelvis.   3.  Heart is enlarged.   4.  Severe stenosis of the right subclavian artery origin             Treatments provided: labs/imaging/pain control  Family Comments: family at  bedside.  OBS brochure/video discussed/provided to patient:  No  ED Medications:   Medications   HYDROmorphone (PF) (DILAUDID) injection 0.5 mg (0.5 mg Intravenous $Given 5/9/25 2040)   labetalol (NORMODYNE/TRANDATE) injection 10 mg (0 mg Intravenous Hold 5/9/25 2118)   heparin 25,000 units in 0.45% NaCl 250 mL ANTICOAGULANT infusion ( Intravenous Canceled Entry 5/9/25 2159)   iopamidol (ISOVUE-370) solution 500 mL (72 mLs Intravenous $Given 5/9/25 2051)   sodium chloride 0.9 % bag for CT scan flush (60 mLs Intravenous $Given 5/9/25 2051)   heparin loading dose for LOW INTENSITY TREATMENT * Give BEFORE starting heparin infusion (3,600 Units Intravenous $Given 5/9/25 2158)       Drips infusing:  Yes  For the majority of the shift this patient was Green.   Interventions performed were none.    Sepsis treatment initiated: No    Cares/treatment/interventions/medications to be completed following ED care: see orders    ED Nurse Name: Gladys Eddy RN  10:04 PM

## 2025-05-10 NOTE — PHARMACY-ADMISSION MEDICATION HISTORY
Pharmacist Admission Medication History    Admission medication history is complete. The information provided in this note is only as accurate as the sources available at the time of the update.    Information Source(s): Patient and Family member via in-person    Pertinent Information: Pt's  states hydralazine was a new rx as of last month and was instructed to use as needed for high BP -  states pt has not started taking, but they do have supply at home.    Changes made to PTA medication list:  Added: None  Deleted: flaxseed  Changed: ASA 81mg daily --> 1 tab bid    Allergies reviewed with patient and updates made in EHR: yes    Medication History Completed By: Haily Britton RPH 5/10/2025 8:17 AM    PTA Med List   Medication Sig Last Dose/Taking    ASPIRIN 81 MG OR TABS Take 81 mg by mouth 2 times daily. 5/9/2025 Morning    Calcium-Magnesium-Vitamin D (CALCIUM MAGNESIUM PO) Take 1 tablet by mouth daily. 5/9/2025 Morning    Cyanocobalamin (B-12) 1000 MCG TBCR Take 1,000 mcg by mouth daily 5/9/2025 Morning    enalapril (VASOTEC) 20 MG tablet Take 1 tablet (20 mg) by mouth 2 times daily. 5/9/2025 Morning    hydrALAZINE (APRESOLINE) 10 MG tablet Take 1 tablet (10 mg) by mouth 3 times daily. Taking    hydrochlorothiazide (HYDRODIURIL) 25 MG tablet TAKE 1/2 TABLET BY MOUTH DAILY 5/9/2025 Morning    hydrOXYzine HCl (ATARAX) 10 MG tablet Take 1 tablet (10 mg) by mouth 2 times daily as needed for itching or anxiety. Taking As Needed    Multiple Vitamins-Minerals (MULTIVITAMIN & MINERAL) LIQD Take 2 oz by mouth daily. 5/9/2025 Morning    VITAMIN D, CHOLECALCIFEROL, PO Take 5 drops by mouth daily. Past Month Morning

## 2025-05-10 NOTE — PROVIDER NOTIFICATION
Dr. Fawad Martinez notified of the following via [employer] messaging:    FYI: Trops 2,705 @ 0400; 1,923 @ 0146; 950 @ 7198 on 5/9 (Read - 7:37 am)  NOC RN stated prior MD aware and not concerned. (Read - 7:37 am)  Cardiologist wants a new EKG. Pls order. (Read - 9:54 am)  Cardiologist called. She will be transferred to [facility] Cardiac Cath lab at some point today. (Delivered - 10:00 am)

## 2025-05-11 ENCOUNTER — ANESTHESIA (OUTPATIENT)
Dept: SURGERY | Facility: CLINIC | Age: 85
End: 2025-05-11
Payer: COMMERCIAL

## 2025-05-11 ENCOUNTER — APPOINTMENT (OUTPATIENT)
Dept: GENERAL RADIOLOGY | Facility: CLINIC | Age: 85
DRG: 233 | End: 2025-05-11
Attending: STUDENT IN AN ORGANIZED HEALTH CARE EDUCATION/TRAINING PROGRAM
Payer: COMMERCIAL

## 2025-05-11 ENCOUNTER — APPOINTMENT (OUTPATIENT)
Dept: GENERAL RADIOLOGY | Facility: CLINIC | Age: 85
DRG: 233 | End: 2025-05-11
Attending: SURGERY
Payer: COMMERCIAL

## 2025-05-11 LAB
ACANTHOCYTES BLD QL SMEAR: SLIGHT
ALBUMIN SERPL BCG-MCNC: 3.3 G/DL (ref 3.5–5.2)
ALBUMIN SERPL BCG-MCNC: 3.4 G/DL (ref 3.5–5.2)
ALLEN'S TEST: ABNORMAL
ALP SERPL-CCNC: 42 U/L (ref 40–150)
ALP SERPL-CCNC: 43 U/L (ref 40–150)
ALT SERPL W P-5'-P-CCNC: 11 U/L (ref 0–50)
ALT SERPL W P-5'-P-CCNC: 16 U/L (ref 0–50)
ANION GAP SERPL CALCULATED.3IONS-SCNC: 11 MMOL/L (ref 7–15)
ANION GAP SERPL CALCULATED.3IONS-SCNC: 12 MMOL/L (ref 7–15)
ANION GAP SERPL CALCULATED.3IONS-SCNC: 14 MMOL/L (ref 7–15)
ANION GAP SERPL CALCULATED.3IONS-SCNC: 20 MMOL/L (ref 7–15)
APTT PPP: 31 SECONDS (ref 22–38)
APTT PPP: 33 SECONDS (ref 22–38)
AST SERPL W P-5'-P-CCNC: 122 U/L (ref 0–45)
AST SERPL W P-5'-P-CCNC: ABNORMAL U/L
BASE EXCESS BLDA CALC-SCNC: -0.5 MMOL/L (ref -3–3)
BASE EXCESS BLDA CALC-SCNC: -1.4 MMOL/L (ref -3–3)
BASE EXCESS BLDA CALC-SCNC: -1.9 MMOL/L (ref -3–3)
BASE EXCESS BLDA CALC-SCNC: -10.2 MMOL/L (ref -3–3)
BASE EXCESS BLDA CALC-SCNC: -2.5 MMOL/L (ref -3–3)
BASE EXCESS BLDA CALC-SCNC: -3.2 MMOL/L (ref -3–3)
BASE EXCESS BLDA CALC-SCNC: -4.7 MMOL/L (ref -3–3)
BASE EXCESS BLDA CALC-SCNC: -4.7 MMOL/L (ref -3–3)
BASE EXCESS BLDA CALC-SCNC: -9.6 MMOL/L (ref -3–3)
BASE EXCESS BLDA CALC-SCNC: 2.1 MMOL/L (ref -3–3)
BASE EXCESS BLDV CALC-SCNC: -1.6 MMOL/L (ref -3–3)
BASE EXCESS BLDV CALC-SCNC: -2.1 MMOL/L (ref -3–3)
BASE EXCESS BLDV CALC-SCNC: -2.5 MMOL/L (ref -3–3)
BASE EXCESS BLDV CALC-SCNC: -4.2 MMOL/L (ref -3–3)
BASE EXCESS BLDV CALC-SCNC: -9.3 MMOL/L (ref -3–3)
BASE EXCESS BLDV CALC-SCNC: -9.6 MMOL/L (ref -3–3)
BASE EXCESS BLDV CALC-SCNC: -9.7 MMOL/L (ref -3–3)
BILIRUB SERPL-MCNC: 0.2 MG/DL
BILIRUB SERPL-MCNC: 0.4 MG/DL
BUN SERPL-MCNC: 27.1 MG/DL (ref 8–23)
BUN SERPL-MCNC: 27.9 MG/DL (ref 8–23)
BUN SERPL-MCNC: 28.1 MG/DL (ref 8–23)
BUN SERPL-MCNC: 32.4 MG/DL (ref 8–23)
CA-I BLD-MCNC: 4.2 MG/DL (ref 4.4–5.2)
CA-I BLD-MCNC: 4.2 MG/DL (ref 4.4–5.2)
CA-I BLD-MCNC: 4.3 MG/DL (ref 4.4–5.2)
CA-I BLD-MCNC: 4.4 MG/DL (ref 4.4–5.2)
CA-I BLD-MCNC: 4.4 MG/DL (ref 4.4–5.2)
CA-I BLD-MCNC: 4.8 MG/DL (ref 4.4–5.2)
CA-I BLD-MCNC: 5.3 MG/DL (ref 4.4–5.2)
CALCIUM SERPL-MCNC: 9 MG/DL (ref 8.8–10.4)
CALCIUM SERPL-MCNC: 9.1 MG/DL (ref 8.8–10.4)
CHLORIDE SERPL-SCNC: 103 MMOL/L (ref 98–107)
CHLORIDE SERPL-SCNC: 103 MMOL/L (ref 98–107)
CHLORIDE SERPL-SCNC: 106 MMOL/L (ref 98–107)
CHLORIDE SERPL-SCNC: 108 MMOL/L (ref 98–107)
CLOT INIT KAOL IND TO POST HEP NEUT TRTO: 1.1 {RATIO}
CLOT INIT KAOLIN IND BLD US: 130 SEC (ref 113–166)
CLOT INIT KAOLIN IND P HEP NEUT BLD US: 123 SEC (ref 103–153)
CLOT STIFF PLT CONT BLD CALC: 22.1 HPA (ref 11.9–29.8)
CLOT STIFF TF IND P HEP NEUT BLD US: 25.2 HPA (ref 13–33.2)
CLOT STIFF TF IND+IIB-IIIA INH P HEP NEU: 3.1 HPA (ref 1–3.7)
CREAT SERPL-MCNC: 1.61 MG/DL (ref 0.51–0.95)
CREAT SERPL-MCNC: 1.67 MG/DL (ref 0.51–0.95)
CREAT SERPL-MCNC: 1.83 MG/DL (ref 0.51–0.95)
CREAT SERPL-MCNC: 1.95 MG/DL (ref 0.51–0.95)
EGFRCR SERPLBLD CKD-EPI 2021: 24 ML/MIN/1.73M2
EGFRCR SERPLBLD CKD-EPI 2021: 26 ML/MIN/1.73M2
EGFRCR SERPLBLD CKD-EPI 2021: 30 ML/MIN/1.73M2
EGFRCR SERPLBLD CKD-EPI 2021: 31 ML/MIN/1.73M2
ELLIPTOCYTES BLD QL SMEAR: SLIGHT
ERYTHROCYTE [DISTWIDTH] IN BLOOD BY AUTOMATED COUNT: 13.4 % (ref 10–15)
ERYTHROCYTE [DISTWIDTH] IN BLOOD BY AUTOMATED COUNT: 13.5 % (ref 10–15)
ERYTHROCYTE [DISTWIDTH] IN BLOOD BY AUTOMATED COUNT: 13.6 % (ref 10–15)
FIBRINOGEN PPP-MCNC: 280 MG/DL (ref 170–510)
FRAGMENTS BLD QL SMEAR: ABNORMAL
GLUCOSE BLD-MCNC: 126 MG/DL (ref 70–99)
GLUCOSE BLD-MCNC: 137 MG/DL (ref 70–99)
GLUCOSE BLD-MCNC: 138 MG/DL (ref 70–99)
GLUCOSE BLD-MCNC: 138 MG/DL (ref 70–99)
GLUCOSE BLD-MCNC: 141 MG/DL (ref 70–99)
GLUCOSE BLD-MCNC: 150 MG/DL (ref 70–99)
GLUCOSE BLD-MCNC: 90 MG/DL (ref 70–99)
GLUCOSE BLDC GLUCOMTR-MCNC: 124 MG/DL (ref 70–99)
GLUCOSE BLDC GLUCOMTR-MCNC: 141 MG/DL (ref 70–99)
GLUCOSE BLDC GLUCOMTR-MCNC: 145 MG/DL (ref 70–99)
GLUCOSE BLDC GLUCOMTR-MCNC: 146 MG/DL (ref 70–99)
GLUCOSE BLDC GLUCOMTR-MCNC: 222 MG/DL (ref 70–99)
GLUCOSE BLDC GLUCOMTR-MCNC: 225 MG/DL (ref 70–99)
GLUCOSE BLDC GLUCOMTR-MCNC: 238 MG/DL (ref 70–99)
GLUCOSE BLDC GLUCOMTR-MCNC: 238 MG/DL (ref 70–99)
GLUCOSE BLDC GLUCOMTR-MCNC: 98 MG/DL (ref 70–99)
GLUCOSE SERPL-MCNC: 115 MG/DL (ref 70–99)
GLUCOSE SERPL-MCNC: 142 MG/DL (ref 70–99)
GLUCOSE SERPL-MCNC: 152 MG/DL (ref 70–99)
GLUCOSE SERPL-MCNC: 215 MG/DL (ref 70–99)
HCO3 BLD-SCNC: 15 MMOL/L (ref 21–28)
HCO3 BLD-SCNC: 16 MMOL/L (ref 21–28)
HCO3 BLD-SCNC: 21 MMOL/L (ref 21–28)
HCO3 BLD-SCNC: 23 MMOL/L (ref 21–28)
HCO3 BLD-SCNC: 25 MMOL/L (ref 21–28)
HCO3 BLDA-SCNC: 21 MMOL/L (ref 21–28)
HCO3 BLDA-SCNC: 21 MMOL/L (ref 21–28)
HCO3 BLDA-SCNC: 23 MMOL/L (ref 21–28)
HCO3 BLDV-SCNC: 17 MMOL/L (ref 21–28)
HCO3 BLDV-SCNC: 20 MMOL/L (ref 21–28)
HCO3 BLDV-SCNC: 21 MMOL/L (ref 21–28)
HCO3 BLDV-SCNC: 22 MMOL/L (ref 21–28)
HCO3 BLDV-SCNC: 24 MMOL/L (ref 21–28)
HCO3 SERPL-SCNC: 15 MMOL/L (ref 22–29)
HCO3 SERPL-SCNC: 20 MMOL/L (ref 22–29)
HCO3 SERPL-SCNC: 23 MMOL/L (ref 22–29)
HCO3 SERPL-SCNC: 25 MMOL/L (ref 22–29)
HCT VFR BLD AUTO: 24.3 % (ref 35–47)
HCT VFR BLD AUTO: 31 % (ref 35–47)
HCT VFR BLD AUTO: 34.5 % (ref 35–47)
HGB BLD-MCNC: 10.1 G/DL (ref 11.7–15.7)
HGB BLD-MCNC: 10.2 G/DL (ref 11.7–15.7)
HGB BLD-MCNC: 11.2 G/DL (ref 11.7–15.7)
HGB BLD-MCNC: 7.1 G/DL (ref 11.7–15.7)
HGB BLD-MCNC: 7.3 G/DL (ref 11.7–15.7)
HGB BLD-MCNC: 7.3 G/DL (ref 11.7–15.7)
HGB BLD-MCNC: 7.4 G/DL (ref 11.7–15.7)
HGB BLD-MCNC: 8.1 G/DL (ref 11.7–15.7)
HGB BLD-MCNC: 8.2 G/DL (ref 11.7–15.7)
HGB BLD-MCNC: 9.4 G/DL (ref 11.7–15.7)
INR PPP: 1.35 (ref 0.85–1.15)
INR PPP: 1.47 (ref 0.85–1.15)
LACTATE BLD-SCNC: 0.5 MMOL/L (ref 0.7–2)
LACTATE BLD-SCNC: 0.6 MMOL/L (ref 0.7–2)
LACTATE BLD-SCNC: 0.6 MMOL/L (ref 0.7–2)
LACTATE BLD-SCNC: 0.7 MMOL/L (ref 0.7–2)
LACTATE BLD-SCNC: 0.8 MMOL/L (ref 0.7–2)
LACTATE BLD-SCNC: 1.2 MMOL/L (ref 0.7–2)
LACTATE BLD-SCNC: 2 MMOL/L (ref 0.7–2)
LACTATE SERPL-SCNC: 3 MMOL/L (ref 0.7–2)
LACTATE SERPL-SCNC: 3.3 MMOL/L (ref 0.7–2)
LACTATE SERPL-SCNC: 4.4 MMOL/L (ref 0.7–2)
LACTATE SERPL-SCNC: 4.7 MMOL/L (ref 0.7–2)
LACTATE SERPL-SCNC: 5.1 MMOL/L (ref 0.7–2)
MAGNESIUM SERPL-MCNC: 2.2 MG/DL (ref 1.7–2.3)
MAGNESIUM SERPL-MCNC: 2.6 MG/DL (ref 1.7–2.3)
MAGNESIUM SERPL-MCNC: 2.9 MG/DL (ref 1.7–2.3)
MCH RBC QN AUTO: 30.4 PG (ref 26.5–33)
MCH RBC QN AUTO: 30.7 PG (ref 26.5–33)
MCH RBC QN AUTO: 31.4 PG (ref 26.5–33)
MCHC RBC AUTO-ENTMCNC: 32.5 G/DL (ref 31.5–36.5)
MCHC RBC AUTO-ENTMCNC: 32.6 G/DL (ref 31.5–36.5)
MCHC RBC AUTO-ENTMCNC: 33.3 G/DL (ref 31.5–36.5)
MCV RBC AUTO: 94 FL (ref 78–100)
MRSA DNA SPEC QL NAA+PROBE: NEGATIVE
O2/TOTAL GAS SETTING VFR VENT: 100 %
O2/TOTAL GAS SETTING VFR VENT: 30 %
O2/TOTAL GAS SETTING VFR VENT: 30 %
O2/TOTAL GAS SETTING VFR VENT: 40 %
O2/TOTAL GAS SETTING VFR VENT: 60 %
O2/TOTAL GAS SETTING VFR VENT: 60 %
O2/TOTAL GAS SETTING VFR VENT: 70 %
O2/TOTAL GAS SETTING VFR VENT: 80 %
OXYHGB MFR BLDA: 68 % (ref 92–100)
OXYHGB MFR BLDA: 98 % (ref 92–100)
OXYHGB MFR BLDA: 99 % (ref 92–100)
OXYHGB MFR BLDV: 74 % (ref 70–75)
OXYHGB MFR BLDV: 75 % (ref 70–75)
OXYHGB MFR BLDV: 79 % (ref 70–75)
OXYHGB MFR BLDV: 80 % (ref 70–75)
OXYHGB MFR BLDV: 81 % (ref 70–75)
OXYHGB MFR BLDV: 87 % (ref 70–75)
OXYHGB MFR BLDV: 89 % (ref 70–75)
PCO2 BLD: 31 MM HG (ref 35–45)
PCO2 BLD: 31 MM HG (ref 35–45)
PCO2 BLD: 32 MM HG (ref 35–45)
PCO2 BLD: 33 MM HG (ref 35–45)
PCO2 BLD: 34 MM HG (ref 35–45)
PCO2 BLDA: 34 MM HG (ref 35–45)
PCO2 BLDA: 39 MM HG (ref 35–45)
PCO2 BLDA: 40 MM HG (ref 35–45)
PCO2 BLDA: 42 MM HG (ref 35–45)
PCO2 BLDA: 43 MM HG (ref 35–45)
PCO2 BLDV: 31 MM HG (ref 40–50)
PCO2 BLDV: 33 MM HG (ref 40–50)
PCO2 BLDV: 33 MM HG (ref 40–50)
PCO2 BLDV: 35 MM HG (ref 40–50)
PCO2 BLDV: 36 MM HG (ref 40–50)
PCO2 BLDV: 37 MM HG (ref 40–50)
PCO2 BLDV: 49 MM HG (ref 40–50)
PEEP: 5 CM H2O
PEEP: 5 CM H2O
PH BLD: 7.29 [PH] (ref 7.35–7.45)
PH BLD: 7.29 [PH] (ref 7.35–7.45)
PH BLD: 7.43 [PH] (ref 7.35–7.45)
PH BLD: 7.48 [PH] (ref 7.35–7.45)
PH BLD: 7.49 [PH] (ref 7.35–7.45)
PH BLDA: 7.31 [PH] (ref 7.35–7.45)
PH BLDA: 7.34 [PH] (ref 7.35–7.45)
PH BLDA: 7.34 [PH] (ref 7.35–7.45)
PH BLDA: 7.37 [PH] (ref 7.35–7.45)
PH BLDA: 7.43 [PH] (ref 7.35–7.45)
PH BLDV: 7.27 [PH] (ref 7.32–7.43)
PH BLDV: 7.27 [PH] (ref 7.32–7.43)
PH BLDV: 7.28 [PH] (ref 7.32–7.43)
PH BLDV: 7.3 [PH] (ref 7.32–7.43)
PH BLDV: 7.39 [PH] (ref 7.32–7.43)
PH BLDV: 7.43 [PH] (ref 7.32–7.43)
PH BLDV: 7.46 [PH] (ref 7.32–7.43)
PHOSPHATE SERPL-MCNC: 1.1 MG/DL (ref 2.5–4.5)
PHOSPHATE SERPL-MCNC: 3.4 MG/DL (ref 2.5–4.5)
PHOSPHATE SERPL-MCNC: 4.5 MG/DL (ref 2.5–4.5)
PLAT MORPH BLD: ABNORMAL
PLATELET # BLD AUTO: 121 10E3/UL (ref 150–450)
PLATELET # BLD AUTO: 156 10E3/UL (ref 150–450)
PLATELET # BLD AUTO: 173 10E3/UL (ref 150–450)
PO2 BLD: 118 MM HG (ref 80–105)
PO2 BLD: 132 MM HG (ref 80–105)
PO2 BLD: 138 MM HG (ref 80–105)
PO2 BLD: 170 MM HG (ref 80–105)
PO2 BLD: 32 MM HG (ref 80–105)
PO2 BLDA: 191 MM HG (ref 80–105)
PO2 BLDA: 258 MM HG (ref 80–105)
PO2 BLDA: 461 MM HG (ref 80–105)
PO2 BLDA: 484 MM HG (ref 80–105)
PO2 BLDA: 537 MM HG (ref 80–105)
PO2 BLDV: 36 MM HG (ref 25–47)
PO2 BLDV: 37 MM HG (ref 25–47)
PO2 BLDV: 43 MM HG (ref 25–47)
PO2 BLDV: 46 MM HG (ref 25–47)
PO2 BLDV: 48 MM HG (ref 25–47)
PO2 BLDV: 56 MM HG (ref 25–47)
PO2 BLDV: 60 MM HG (ref 25–47)
POTASSIUM BLD-SCNC: 3.3 MMOL/L (ref 3.4–5.3)
POTASSIUM BLD-SCNC: 3.4 MMOL/L (ref 3.4–5.3)
POTASSIUM BLD-SCNC: 3.7 MMOL/L (ref 3.4–5.3)
POTASSIUM BLD-SCNC: 4.3 MMOL/L (ref 3.4–5.3)
POTASSIUM BLD-SCNC: 4.4 MMOL/L (ref 3.4–5.3)
POTASSIUM BLD-SCNC: 4.5 MMOL/L (ref 3.4–5.3)
POTASSIUM BLD-SCNC: 4.8 MMOL/L (ref 3.4–5.3)
POTASSIUM SERPL-SCNC: 3.6 MMOL/L (ref 3.4–5.3)
POTASSIUM SERPL-SCNC: 3.9 MMOL/L (ref 3.4–5.3)
POTASSIUM SERPL-SCNC: 4.2 MMOL/L (ref 3.4–5.3)
POTASSIUM SERPL-SCNC: 4.4 MMOL/L (ref 3.4–5.3)
PROT SERPL-MCNC: 5.3 G/DL (ref 6.4–8.3)
PROT SERPL-MCNC: 5.4 G/DL (ref 6.4–8.3)
PROTHROMBIN TIME: 16.3 SECONDS (ref 11.8–14.8)
PROTHROMBIN TIME: 17.4 SECONDS (ref 11.8–14.8)
RBC # BLD AUTO: 2.58 10E6/UL (ref 3.8–5.2)
RBC # BLD AUTO: 3.29 10E6/UL (ref 3.8–5.2)
RBC # BLD AUTO: 3.69 10E6/UL (ref 3.8–5.2)
RBC MORPH BLD: ABNORMAL
SA TARGET DNA: POSITIVE
SAO2 % BLDA: 100 % (ref 95–96)
SAO2 % BLDA: 100 % (ref 95–96)
SAO2 % BLDA: 69.8 % (ref 95–96)
SAO2 % BLDA: 98.6 % (ref 95–96)
SAO2 % BLDA: 99.6 % (ref 95–96)
SAO2 % BLDA: 99.9 % (ref 95–96)
SAO2 % BLDA: 99.9 % (ref 95–96)
SAO2 % BLDA: >100 % (ref 95–96)
SAO2 % BLDV: 76 % (ref 70–75)
SAO2 % BLDV: 76.1 % (ref 70–75)
SAO2 % BLDV: 80.1 % (ref 70–75)
SAO2 % BLDV: 81.1 % (ref 70–75)
SAO2 % BLDV: 82.7 % (ref 70–75)
SAO2 % BLDV: 88 % (ref 70–75)
SAO2 % BLDV: 91 % (ref 70–75)
SODIUM BLD-SCNC: 139 MMOL/L (ref 135–145)
SODIUM BLD-SCNC: 140 MMOL/L (ref 135–145)
SODIUM BLD-SCNC: 141 MMOL/L (ref 135–145)
SODIUM SERPL-SCNC: 138 MMOL/L (ref 135–145)
SODIUM SERPL-SCNC: 138 MMOL/L (ref 135–145)
SODIUM SERPL-SCNC: 140 MMOL/L (ref 135–145)
SODIUM SERPL-SCNC: 144 MMOL/L (ref 135–145)
TOXIC GRANULES BLD QL SMEAR: PRESENT
WBC # BLD AUTO: 11.1 10E3/UL (ref 4–11)
WBC # BLD AUTO: 22.3 10E3/UL (ref 4–11)
WBC # BLD AUTO: 27.2 10E3/UL (ref 4–11)

## 2025-05-11 PROCEDURE — 360N000079 HC SURGERY LEVEL 6, PER MIN: Performed by: STUDENT IN AN ORGANIZED HEALTH CARE EDUCATION/TRAINING PROGRAM

## 2025-05-11 PROCEDURE — 370N000017 HC ANESTHESIA TECHNICAL FEE, PER MIN: Performed by: STUDENT IN AN ORGANIZED HEALTH CARE EDUCATION/TRAINING PROGRAM

## 2025-05-11 PROCEDURE — 84100 ASSAY OF PHOSPHORUS: CPT | Performed by: STUDENT IN AN ORGANIZED HEALTH CARE EDUCATION/TRAINING PROGRAM

## 2025-05-11 PROCEDURE — 82805 BLOOD GASES W/O2 SATURATION: CPT | Performed by: INTERNAL MEDICINE

## 2025-05-11 PROCEDURE — 82330 ASSAY OF CALCIUM: CPT | Performed by: SURGERY

## 2025-05-11 PROCEDURE — 83735 ASSAY OF MAGNESIUM: CPT | Performed by: STUDENT IN AN ORGANIZED HEALTH CARE EDUCATION/TRAINING PROGRAM

## 2025-05-11 PROCEDURE — 250N000011 HC RX IP 250 OP 636: Mod: JZ | Performed by: NURSE ANESTHETIST, CERTIFIED REGISTERED

## 2025-05-11 PROCEDURE — 82330 ASSAY OF CALCIUM: CPT

## 2025-05-11 PROCEDURE — 250N000013 HC RX MED GY IP 250 OP 250 PS 637: Performed by: SURGERY

## 2025-05-11 PROCEDURE — 84100 ASSAY OF PHOSPHORUS: CPT | Performed by: SURGERY

## 2025-05-11 PROCEDURE — 84520 ASSAY OF UREA NITROGEN: CPT | Performed by: STUDENT IN AN ORGANIZED HEALTH CARE EDUCATION/TRAINING PROGRAM

## 2025-05-11 PROCEDURE — 4A133B3 MONITORING OF ARTERIAL PRESSURE, PULMONARY, PERCUTANEOUS APPROACH: ICD-10-PCS | Performed by: STUDENT IN AN ORGANIZED HEALTH CARE EDUCATION/TRAINING PROGRAM

## 2025-05-11 PROCEDURE — 02100Z9 BYPASS CORONARY ARTERY, ONE ARTERY FROM LEFT INTERNAL MAMMARY, OPEN APPROACH: ICD-10-PCS | Performed by: STUDENT IN AN ORGANIZED HEALTH CARE EDUCATION/TRAINING PROGRAM

## 2025-05-11 PROCEDURE — 250N000011 HC RX IP 250 OP 636: Performed by: NURSE PRACTITIONER

## 2025-05-11 PROCEDURE — 410N000005 HC PER-PERFUSION, SH ONLY, 1ST 30 MIN: Performed by: STUDENT IN AN ORGANIZED HEALTH CARE EDUCATION/TRAINING PROGRAM

## 2025-05-11 PROCEDURE — 272N000001 HC OR GENERAL SUPPLY STERILE: Performed by: STUDENT IN AN ORGANIZED HEALTH CARE EDUCATION/TRAINING PROGRAM

## 2025-05-11 PROCEDURE — 02HQ32Z INSERTION OF MONITORING DEVICE INTO RIGHT PULMONARY ARTERY, PERCUTANEOUS APPROACH: ICD-10-PCS | Performed by: STUDENT IN AN ORGANIZED HEALTH CARE EDUCATION/TRAINING PROGRAM

## 2025-05-11 PROCEDURE — 83605 ASSAY OF LACTIC ACID: CPT | Performed by: INTERNAL MEDICINE

## 2025-05-11 PROCEDURE — 258N000003 HC RX IP 258 OP 636: Performed by: NURSE PRACTITIONER

## 2025-05-11 PROCEDURE — 200N000001 HC R&B ICU

## 2025-05-11 PROCEDURE — 258N000003 HC RX IP 258 OP 636: Performed by: NURSE ANESTHETIST, CERTIFIED REGISTERED

## 2025-05-11 PROCEDURE — 87641 MR-STAPH DNA AMP PROBE: CPT | Performed by: NURSE PRACTITIONER

## 2025-05-11 PROCEDURE — 250N000024 HC ISOFLURANE, PER MIN: Performed by: STUDENT IN AN ORGANIZED HEALTH CARE EDUCATION/TRAINING PROGRAM

## 2025-05-11 PROCEDURE — 250N000009 HC RX 250: Performed by: SURGERY

## 2025-05-11 PROCEDURE — 85730 THROMBOPLASTIN TIME PARTIAL: CPT | Performed by: STUDENT IN AN ORGANIZED HEALTH CARE EDUCATION/TRAINING PROGRAM

## 2025-05-11 PROCEDURE — 82330 ASSAY OF CALCIUM: CPT | Performed by: STUDENT IN AN ORGANIZED HEALTH CARE EDUCATION/TRAINING PROGRAM

## 2025-05-11 PROCEDURE — 82805 BLOOD GASES W/O2 SATURATION: CPT | Performed by: SURGERY

## 2025-05-11 PROCEDURE — 33533 CABG ARTERIAL SINGLE: CPT | Mod: GC | Performed by: STUDENT IN AN ORGANIZED HEALTH CARE EDUCATION/TRAINING PROGRAM

## 2025-05-11 PROCEDURE — 258N000003 HC RX IP 258 OP 636: Performed by: STUDENT IN AN ORGANIZED HEALTH CARE EDUCATION/TRAINING PROGRAM

## 2025-05-11 PROCEDURE — 85396 CLOTTING ASSAY WHOLE BLOOD: CPT

## 2025-05-11 PROCEDURE — 82247 BILIRUBIN TOTAL: CPT | Performed by: SURGERY

## 2025-05-11 PROCEDURE — 250N000011 HC RX IP 250 OP 636: Performed by: SURGERY

## 2025-05-11 PROCEDURE — 250N000012 HC RX MED GY IP 250 OP 636 PS 637: Performed by: STUDENT IN AN ORGANIZED HEALTH CARE EDUCATION/TRAINING PROGRAM

## 2025-05-11 PROCEDURE — 82805 BLOOD GASES W/O2 SATURATION: CPT | Performed by: STUDENT IN AN ORGANIZED HEALTH CARE EDUCATION/TRAINING PROGRAM

## 2025-05-11 PROCEDURE — 80048 BASIC METABOLIC PNL TOTAL CA: CPT | Performed by: STUDENT IN AN ORGANIZED HEALTH CARE EDUCATION/TRAINING PROGRAM

## 2025-05-11 PROCEDURE — 85384 FIBRINOGEN ACTIVITY: CPT | Performed by: STUDENT IN AN ORGANIZED HEALTH CARE EDUCATION/TRAINING PROGRAM

## 2025-05-11 PROCEDURE — 94002 VENT MGMT INPAT INIT DAY: CPT

## 2025-05-11 PROCEDURE — 410N000006: Performed by: STUDENT IN AN ORGANIZED HEALTH CARE EDUCATION/TRAINING PROGRAM

## 2025-05-11 PROCEDURE — 93005 ELECTROCARDIOGRAM TRACING: CPT

## 2025-05-11 PROCEDURE — 85014 HEMATOCRIT: CPT | Performed by: STUDENT IN AN ORGANIZED HEALTH CARE EDUCATION/TRAINING PROGRAM

## 2025-05-11 PROCEDURE — 83605 ASSAY OF LACTIC ACID: CPT | Performed by: SURGERY

## 2025-05-11 PROCEDURE — P9016 RBC LEUKOCYTES REDUCED: HCPCS | Performed by: STUDENT IN AN ORGANIZED HEALTH CARE EDUCATION/TRAINING PROGRAM

## 2025-05-11 PROCEDURE — 85730 THROMBOPLASTIN TIME PARTIAL: CPT | Performed by: SURGERY

## 2025-05-11 PROCEDURE — 999N000009 HC STATISTIC AIRWAY CARE

## 2025-05-11 PROCEDURE — 33517 CABG ARTERY-VEIN SINGLE: CPT | Mod: GC | Performed by: STUDENT IN AN ORGANIZED HEALTH CARE EDUCATION/TRAINING PROGRAM

## 2025-05-11 PROCEDURE — 250N000013 HC RX MED GY IP 250 OP 250 PS 637: Performed by: STUDENT IN AN ORGANIZED HEALTH CARE EDUCATION/TRAINING PROGRAM

## 2025-05-11 PROCEDURE — 85610 PROTHROMBIN TIME: CPT | Performed by: SURGERY

## 2025-05-11 PROCEDURE — 83735 ASSAY OF MAGNESIUM: CPT | Performed by: SURGERY

## 2025-05-11 PROCEDURE — 999N000157 HC STATISTIC RCP TIME EA 10 MIN

## 2025-05-11 PROCEDURE — 250N000009 HC RX 250: Performed by: NURSE PRACTITIONER

## 2025-05-11 PROCEDURE — 999N000065 XR CHEST PORT 1 VIEW

## 2025-05-11 PROCEDURE — 84155 ASSAY OF PROTEIN SERUM: CPT | Performed by: SURGERY

## 2025-05-11 PROCEDURE — 80069 RENAL FUNCTION PANEL: CPT | Performed by: STUDENT IN AN ORGANIZED HEALTH CARE EDUCATION/TRAINING PROGRAM

## 2025-05-11 PROCEDURE — 250N000011 HC RX IP 250 OP 636: Performed by: NURSE ANESTHETIST, CERTIFIED REGISTERED

## 2025-05-11 PROCEDURE — 82374 ASSAY BLOOD CARBON DIOXIDE: CPT | Performed by: STUDENT IN AN ORGANIZED HEALTH CARE EDUCATION/TRAINING PROGRAM

## 2025-05-11 PROCEDURE — 99291 CRITICAL CARE FIRST HOUR: CPT | Performed by: STUDENT IN AN ORGANIZED HEALTH CARE EDUCATION/TRAINING PROGRAM

## 2025-05-11 PROCEDURE — 250N000011 HC RX IP 250 OP 636: Performed by: STUDENT IN AN ORGANIZED HEALTH CARE EDUCATION/TRAINING PROGRAM

## 2025-05-11 PROCEDURE — 4A1239Z MONITORING OF CARDIAC OUTPUT, PERCUTANEOUS APPROACH: ICD-10-PCS | Performed by: STUDENT IN AN ORGANIZED HEALTH CARE EDUCATION/TRAINING PROGRAM

## 2025-05-11 PROCEDURE — 250N000009 HC RX 250: Performed by: STUDENT IN AN ORGANIZED HEALTH CARE EDUCATION/TRAINING PROGRAM

## 2025-05-11 PROCEDURE — 250N000009 HC RX 250: Performed by: NURSE ANESTHETIST, CERTIFIED REGISTERED

## 2025-05-11 PROCEDURE — 5A1223Z PERFORMANCE OF CARDIAC PACING, CONTINUOUS: ICD-10-PCS | Performed by: STUDENT IN AN ORGANIZED HEALTH CARE EDUCATION/TRAINING PROGRAM

## 2025-05-11 PROCEDURE — 84295 ASSAY OF SERUM SODIUM: CPT

## 2025-05-11 PROCEDURE — 258N000003 HC RX IP 258 OP 636: Performed by: SURGERY

## 2025-05-11 PROCEDURE — 021009W BYPASS CORONARY ARTERY, ONE ARTERY FROM AORTA WITH AUTOLOGOUS VENOUS TISSUE, OPEN APPROACH: ICD-10-PCS | Performed by: STUDENT IN AN ORGANIZED HEALTH CARE EDUCATION/TRAINING PROGRAM

## 2025-05-11 PROCEDURE — 85610 PROTHROMBIN TIME: CPT | Performed by: STUDENT IN AN ORGANIZED HEALTH CARE EDUCATION/TRAINING PROGRAM

## 2025-05-11 PROCEDURE — 87640 STAPH A DNA AMP PROBE: CPT | Performed by: NURSE PRACTITIONER

## 2025-05-11 PROCEDURE — C1898 LEAD, PMKR, OTHER THAN TRANS: HCPCS | Performed by: STUDENT IN AN ORGANIZED HEALTH CARE EDUCATION/TRAINING PROGRAM

## 2025-05-11 PROCEDURE — 76984 DX INTRAOP THORACIC AORTA US: CPT | Mod: 26 | Performed by: STUDENT IN AN ORGANIZED HEALTH CARE EDUCATION/TRAINING PROGRAM

## 2025-05-11 PROCEDURE — 36415 COLL VENOUS BLD VENIPUNCTURE: CPT | Performed by: STUDENT IN AN ORGANIZED HEALTH CARE EDUCATION/TRAINING PROGRAM

## 2025-05-11 PROCEDURE — 06BQ4ZZ EXCISION OF LEFT SAPHENOUS VEIN, PERCUTANEOUS ENDOSCOPIC APPROACH: ICD-10-PCS | Performed by: STUDENT IN AN ORGANIZED HEALTH CARE EDUCATION/TRAINING PROGRAM

## 2025-05-11 PROCEDURE — 250N000013 HC RX MED GY IP 250 OP 250 PS 637: Performed by: NURSE PRACTITIONER

## 2025-05-11 PROCEDURE — 82310 ASSAY OF CALCIUM: CPT | Performed by: STUDENT IN AN ORGANIZED HEALTH CARE EDUCATION/TRAINING PROGRAM

## 2025-05-11 PROCEDURE — 5A1221Z PERFORMANCE OF CARDIAC OUTPUT, CONTINUOUS: ICD-10-PCS | Performed by: STUDENT IN AN ORGANIZED HEALTH CARE EDUCATION/TRAINING PROGRAM

## 2025-05-11 PROCEDURE — 85027 COMPLETE CBC AUTOMATED: CPT | Performed by: SURGERY

## 2025-05-11 PROCEDURE — 84460 ALANINE AMINO (ALT) (SGPT): CPT | Performed by: SURGERY

## 2025-05-11 PROCEDURE — P9045 ALBUMIN (HUMAN), 5%, 250 ML: HCPCS | Performed by: NURSE ANESTHETIST, CERTIFIED REGISTERED

## 2025-05-11 PROCEDURE — 80048 BASIC METABOLIC PNL TOTAL CA: CPT

## 2025-05-11 RX ORDER — INDOMETHACIN 25 MG/1
50 CAPSULE ORAL ONCE
Status: COMPLETED | OUTPATIENT
Start: 2025-05-11 | End: 2025-05-11

## 2025-05-11 RX ORDER — DEXMEDETOMIDINE HYDROCHLORIDE 4 UG/ML
.2-.7 INJECTION, SOLUTION INTRAVENOUS CONTINUOUS
Status: DISCONTINUED | OUTPATIENT
Start: 2025-05-11 | End: 2025-05-13

## 2025-05-11 RX ORDER — POTASSIUM CHLORIDE 7.45 MG/ML
10 INJECTION INTRAVENOUS ONCE
Status: COMPLETED | OUTPATIENT
Start: 2025-05-11 | End: 2025-05-12

## 2025-05-11 RX ORDER — INDOMETHACIN 25 MG/1
CAPSULE ORAL
Status: COMPLETED
Start: 2025-05-11 | End: 2025-05-11

## 2025-05-11 RX ORDER — ACETAMINOPHEN 325 MG/1
975 TABLET ORAL ONCE
Status: COMPLETED | OUTPATIENT
Start: 2025-05-11 | End: 2025-05-11

## 2025-05-11 RX ORDER — ASPIRIN 81 MG/1
81 TABLET, CHEWABLE ORAL
Status: COMPLETED | OUTPATIENT
Start: 2025-05-11 | End: 2025-05-11

## 2025-05-11 RX ORDER — NICOTINE POLACRILEX 4 MG
15-30 LOZENGE BUCCAL
Status: DISCONTINUED | OUTPATIENT
Start: 2025-05-11 | End: 2025-05-19 | Stop reason: HOSPADM

## 2025-05-11 RX ORDER — CEFAZOLIN SODIUM 2 G/50ML
2 SOLUTION INTRAVENOUS SEE ADMIN INSTRUCTIONS
Status: DISCONTINUED | OUTPATIENT
Start: 2025-05-11 | End: 2025-05-11 | Stop reason: HOSPADM

## 2025-05-11 RX ORDER — EPINEPHRINE IN 0.9 % SOD CHLOR 5 MG/250ML
.01-.3 PLASTIC BAG, INJECTION (ML) INTRAVENOUS CONTINUOUS
Status: DISCONTINUED | OUTPATIENT
Start: 2025-05-11 | End: 2025-05-11 | Stop reason: HOSPADM

## 2025-05-11 RX ORDER — HEPARIN SODIUM 1000 [USP'U]/ML
INJECTION, SOLUTION INTRAVENOUS; SUBCUTANEOUS PRN
Status: DISCONTINUED | OUTPATIENT
Start: 2025-05-11 | End: 2025-05-11

## 2025-05-11 RX ORDER — CEFAZOLIN SODIUM 1 G/3ML
1 INJECTION, POWDER, FOR SOLUTION INTRAMUSCULAR; INTRAVENOUS EVERY 8 HOURS
Status: COMPLETED | OUTPATIENT
Start: 2025-05-11 | End: 2025-05-12

## 2025-05-11 RX ORDER — ONDANSETRON 4 MG/1
4 TABLET, ORALLY DISINTEGRATING ORAL EVERY 6 HOURS PRN
Status: DISCONTINUED | OUTPATIENT
Start: 2025-05-11 | End: 2025-05-19 | Stop reason: HOSPADM

## 2025-05-11 RX ORDER — NOREPINEPHRINE BITARTRATE 0.02 MG/ML
INJECTION, SOLUTION INTRAVENOUS CONTINUOUS PRN
Status: DISCONTINUED | OUTPATIENT
Start: 2025-05-11 | End: 2025-05-11

## 2025-05-11 RX ORDER — NALOXONE HYDROCHLORIDE 0.4 MG/ML
0.4 INJECTION, SOLUTION INTRAMUSCULAR; INTRAVENOUS; SUBCUTANEOUS
Status: DISCONTINUED | OUTPATIENT
Start: 2025-05-11 | End: 2025-05-19 | Stop reason: HOSPADM

## 2025-05-11 RX ORDER — PHENYLEPHRINE HCL IN 0.9% NACL 50MG/250ML
.1-6 PLASTIC BAG, INJECTION (ML) INTRAVENOUS CONTINUOUS
Status: DISCONTINUED | OUTPATIENT
Start: 2025-05-11 | End: 2025-05-11 | Stop reason: HOSPADM

## 2025-05-11 RX ORDER — VECURONIUM BROMIDE 1 MG/ML
INJECTION, POWDER, LYOPHILIZED, FOR SOLUTION INTRAVENOUS PRN
Status: DISCONTINUED | OUTPATIENT
Start: 2025-05-11 | End: 2025-05-11

## 2025-05-11 RX ORDER — DEXTROSE MONOHYDRATE 25 G/50ML
25-50 INJECTION, SOLUTION INTRAVENOUS
Status: DISCONTINUED | OUTPATIENT
Start: 2025-05-11 | End: 2025-05-19 | Stop reason: HOSPADM

## 2025-05-11 RX ORDER — NALOXONE HYDROCHLORIDE 0.4 MG/ML
0.2 INJECTION, SOLUTION INTRAMUSCULAR; INTRAVENOUS; SUBCUTANEOUS
Status: DISCONTINUED | OUTPATIENT
Start: 2025-05-11 | End: 2025-05-19 | Stop reason: HOSPADM

## 2025-05-11 RX ORDER — GABAPENTIN 100 MG/1
100 CAPSULE ORAL
Status: COMPLETED | OUTPATIENT
Start: 2025-05-11 | End: 2025-05-11

## 2025-05-11 RX ORDER — POLYETHYLENE GLYCOL 3350 17 G/17G
17 POWDER, FOR SOLUTION ORAL DAILY
Status: DISCONTINUED | OUTPATIENT
Start: 2025-05-12 | End: 2025-05-19 | Stop reason: HOSPADM

## 2025-05-11 RX ORDER — HYDROMORPHONE HYDROCHLORIDE 1 MG/ML
INJECTION, SOLUTION INTRAMUSCULAR; INTRAVENOUS; SUBCUTANEOUS PRN
Status: DISCONTINUED | OUTPATIENT
Start: 2025-05-11 | End: 2025-05-11

## 2025-05-11 RX ORDER — CEFAZOLIN SODIUM 2 G/50ML
2 SOLUTION INTRAVENOUS
Status: COMPLETED | OUTPATIENT
Start: 2025-05-11 | End: 2025-05-11

## 2025-05-11 RX ORDER — ACETAMINOPHEN 325 MG/1
975 TABLET ORAL EVERY 8 HOURS
Status: DISCONTINUED | OUTPATIENT
Start: 2025-05-11 | End: 2025-05-19 | Stop reason: HOSPADM

## 2025-05-11 RX ORDER — ONDANSETRON 2 MG/ML
4 INJECTION INTRAMUSCULAR; INTRAVENOUS EVERY 6 HOURS PRN
Status: DISCONTINUED | OUTPATIENT
Start: 2025-05-11 | End: 2025-05-19 | Stop reason: HOSPADM

## 2025-05-11 RX ORDER — HYDROMORPHONE HCL IN WATER/PF 6 MG/30 ML
0.2 PATIENT CONTROLLED ANALGESIA SYRINGE INTRAVENOUS EVERY 4 HOURS PRN
Status: DISCONTINUED | OUTPATIENT
Start: 2025-05-11 | End: 2025-05-13

## 2025-05-11 RX ORDER — PROPOFOL 10 MG/ML
5-75 INJECTION, EMULSION INTRAVENOUS CONTINUOUS
Status: DISCONTINUED | OUTPATIENT
Start: 2025-05-11 | End: 2025-05-13

## 2025-05-11 RX ORDER — PROCHLORPERAZINE MALEATE 5 MG/1
5 TABLET ORAL EVERY 6 HOURS PRN
Status: DISCONTINUED | OUTPATIENT
Start: 2025-05-11 | End: 2025-05-15

## 2025-05-11 RX ORDER — HEPARIN SOD,PORCINE/0.9 % NACL 30K/1000ML
INTRAVENOUS SOLUTION INTRAVENOUS
Status: DISCONTINUED | OUTPATIENT
Start: 2025-05-11 | End: 2025-05-11 | Stop reason: HOSPADM

## 2025-05-11 RX ORDER — NOREPINEPHRINE BITARTRATE 0.02 MG/ML
.01-.1 INJECTION, SOLUTION INTRAVENOUS CONTINUOUS
Status: DISCONTINUED | OUTPATIENT
Start: 2025-05-11 | End: 2025-05-11 | Stop reason: HOSPADM

## 2025-05-11 RX ORDER — FENTANYL CITRATE 50 UG/ML
INJECTION, SOLUTION INTRAMUSCULAR; INTRAVENOUS PRN
Status: DISCONTINUED | OUTPATIENT
Start: 2025-05-11 | End: 2025-05-11

## 2025-05-11 RX ORDER — LIDOCAINE 40 MG/G
CREAM TOPICAL
Status: DISCONTINUED | OUTPATIENT
Start: 2025-05-11 | End: 2025-05-19 | Stop reason: HOSPADM

## 2025-05-11 RX ORDER — EPINEPHRINE IN 0.9 % SOD CHLOR 5 MG/250ML
.01-.1 PLASTIC BAG, INJECTION (ML) INTRAVENOUS CONTINUOUS
Status: DISCONTINUED | OUTPATIENT
Start: 2025-05-11 | End: 2025-05-13

## 2025-05-11 RX ORDER — ASPIRIN 81 MG/1
162 TABLET, CHEWABLE ORAL
Status: COMPLETED | OUTPATIENT
Start: 2025-05-11 | End: 2025-05-11

## 2025-05-11 RX ORDER — ASPIRIN 81 MG/1
162 TABLET, CHEWABLE ORAL DAILY
Status: DISCONTINUED | OUTPATIENT
Start: 2025-05-11 | End: 2025-05-19 | Stop reason: HOSPADM

## 2025-05-11 RX ORDER — CALCIUM GLUCONATE 20 MG/ML
2 INJECTION, SOLUTION INTRAVENOUS
Status: DISCONTINUED | OUTPATIENT
Start: 2025-05-11 | End: 2025-05-14

## 2025-05-11 RX ORDER — DEXAMETHASONE SODIUM PHOSPHATE 4 MG/ML
INJECTION, SOLUTION INTRA-ARTICULAR; INTRALESIONAL; INTRAMUSCULAR; INTRAVENOUS; SOFT TISSUE PRN
Status: DISCONTINUED | OUTPATIENT
Start: 2025-05-11 | End: 2025-05-11

## 2025-05-11 RX ORDER — BISACODYL 10 MG
10 SUPPOSITORY, RECTAL RECTAL DAILY PRN
Status: DISCONTINUED | OUTPATIENT
Start: 2025-05-14 | End: 2025-05-19 | Stop reason: HOSPADM

## 2025-05-11 RX ORDER — SODIUM CHLORIDE, SODIUM LACTATE, POTASSIUM CHLORIDE, CALCIUM CHLORIDE 600; 310; 30; 20 MG/100ML; MG/100ML; MG/100ML; MG/100ML
INJECTION, SOLUTION INTRAVENOUS CONTINUOUS PRN
Status: DISCONTINUED | OUTPATIENT
Start: 2025-05-11 | End: 2025-05-11

## 2025-05-11 RX ORDER — CALCIUM GLUCONATE 20 MG/ML
1 INJECTION, SOLUTION INTRAVENOUS
Status: DISCONTINUED | OUTPATIENT
Start: 2025-05-11 | End: 2025-05-14

## 2025-05-11 RX ORDER — LIDOCAINE HYDROCHLORIDE 20 MG/ML
INJECTION, SOLUTION INFILTRATION; PERINEURAL PRN
Status: DISCONTINUED | OUTPATIENT
Start: 2025-05-11 | End: 2025-05-11

## 2025-05-11 RX ORDER — HYDRALAZINE HYDROCHLORIDE 20 MG/ML
10 INJECTION INTRAMUSCULAR; INTRAVENOUS EVERY 30 MIN PRN
Status: DISCONTINUED | OUTPATIENT
Start: 2025-05-11 | End: 2025-05-19 | Stop reason: HOSPADM

## 2025-05-11 RX ORDER — SODIUM CHLORIDE 9 MG/ML
INJECTION, SOLUTION INTRAVENOUS CONTINUOUS PRN
Status: DISCONTINUED | OUTPATIENT
Start: 2025-05-11 | End: 2025-05-11

## 2025-05-11 RX ORDER — FAMOTIDINE 20 MG/1
20 TABLET, FILM COATED ORAL
Status: COMPLETED | OUTPATIENT
Start: 2025-05-11 | End: 2025-05-11

## 2025-05-11 RX ORDER — HEPARIN SODIUM 5000 [USP'U]/.5ML
5000 INJECTION, SOLUTION INTRAVENOUS; SUBCUTANEOUS EVERY 8 HOURS
Status: DISCONTINUED | OUTPATIENT
Start: 2025-05-12 | End: 2025-05-13

## 2025-05-11 RX ORDER — OXYCODONE HYDROCHLORIDE 5 MG/1
5 TABLET ORAL EVERY 4 HOURS PRN
Status: DISCONTINUED | OUTPATIENT
Start: 2025-05-11 | End: 2025-05-15

## 2025-05-11 RX ORDER — PROTAMINE SULFATE 10 MG/ML
INJECTION, SOLUTION INTRAVENOUS PRN
Status: DISCONTINUED | OUTPATIENT
Start: 2025-05-11 | End: 2025-05-11

## 2025-05-11 RX ORDER — PROPOFOL 10 MG/ML
INJECTION, EMULSION INTRAVENOUS PRN
Status: DISCONTINUED | OUTPATIENT
Start: 2025-05-11 | End: 2025-05-11

## 2025-05-11 RX ORDER — HYDROMORPHONE HCL IN WATER/PF 6 MG/30 ML
0.1 PATIENT CONTROLLED ANALGESIA SYRINGE INTRAVENOUS EVERY 4 HOURS PRN
Status: DISCONTINUED | OUTPATIENT
Start: 2025-05-11 | End: 2025-05-13

## 2025-05-11 RX ORDER — PROPOFOL 10 MG/ML
INJECTION, EMULSION INTRAVENOUS CONTINUOUS PRN
Status: DISCONTINUED | OUTPATIENT
Start: 2025-05-11 | End: 2025-05-11

## 2025-05-11 RX ORDER — VASOPRESSIN IN 0.9 % NACL 2 UNIT/2ML
SYRINGE (ML) INTRAVENOUS PRN
Status: DISCONTINUED | OUTPATIENT
Start: 2025-05-11 | End: 2025-05-11

## 2025-05-11 RX ORDER — DEXMEDETOMIDINE HYDROCHLORIDE 4 UG/ML
.1-1.2 INJECTION, SOLUTION INTRAVENOUS CONTINUOUS
Status: DISCONTINUED | OUTPATIENT
Start: 2025-05-11 | End: 2025-05-11 | Stop reason: HOSPADM

## 2025-05-11 RX ORDER — LIDOCAINE 40 MG/G
CREAM TOPICAL
Status: DISCONTINUED | OUTPATIENT
Start: 2025-05-11 | End: 2025-05-11 | Stop reason: HOSPADM

## 2025-05-11 RX ORDER — SODIUM CHLORIDE, SODIUM LACTATE, POTASSIUM CHLORIDE, CALCIUM CHLORIDE 600; 310; 30; 20 MG/100ML; MG/100ML; MG/100ML; MG/100ML
INJECTION, SOLUTION INTRAVENOUS CONTINUOUS
Status: DISCONTINUED | OUTPATIENT
Start: 2025-05-11 | End: 2025-05-13

## 2025-05-11 RX ORDER — NOREPINEPHRINE BITARTRATE 0.02 MG/ML
.01-.15 INJECTION, SOLUTION INTRAVENOUS CONTINUOUS PRN
Status: DISCONTINUED | OUTPATIENT
Start: 2025-05-11 | End: 2025-05-13

## 2025-05-11 RX ORDER — PANTOPRAZOLE SODIUM 40 MG/1
40 TABLET, DELAYED RELEASE ORAL DAILY
Status: DISCONTINUED | OUTPATIENT
Start: 2025-05-11 | End: 2025-05-19 | Stop reason: HOSPADM

## 2025-05-11 RX ORDER — AMOXICILLIN 250 MG
1 CAPSULE ORAL 2 TIMES DAILY
Status: DISCONTINUED | OUTPATIENT
Start: 2025-05-11 | End: 2025-05-13

## 2025-05-11 RX ADMIN — SODIUM BICARBONATE 50 MEQ: 84 INJECTION INTRAVENOUS at 16:14

## 2025-05-11 RX ADMIN — SODIUM CHLORIDE, SODIUM LACTATE, POTASSIUM CHLORIDE, AND CALCIUM CHLORIDE 500 ML: .6; .31; .03; .02 INJECTION, SOLUTION INTRAVENOUS at 21:40

## 2025-05-11 RX ADMIN — PHENYLEPHRINE HYDROCHLORIDE 100 MCG: 10 INJECTION INTRAVENOUS at 08:22

## 2025-05-11 RX ADMIN — PHENYLEPHRINE HYDROCHLORIDE 100 MCG: 10 INJECTION INTRAVENOUS at 12:27

## 2025-05-11 RX ADMIN — CEFAZOLIN SODIUM 2 G: 2 SOLUTION INTRAVENOUS at 12:30

## 2025-05-11 RX ADMIN — MIDAZOLAM HYDROCHLORIDE 2 MG: 1 INJECTION, SOLUTION INTRAMUSCULAR; INTRAVENOUS at 08:05

## 2025-05-11 RX ADMIN — SODIUM CHLORIDE, SODIUM LACTATE, POTASSIUM CHLORIDE, AND CALCIUM CHLORIDE: .6; .31; .03; .02 INJECTION, SOLUTION INTRAVENOUS at 08:30

## 2025-05-11 RX ADMIN — HEPARIN SODIUM 22000 UNITS: 1000 INJECTION INTRAVENOUS; SUBCUTANEOUS at 10:10

## 2025-05-11 RX ADMIN — FAMOTIDINE 20 MG: 20 TABLET, FILM COATED ORAL at 04:11

## 2025-05-11 RX ADMIN — ACETAMINOPHEN 975 MG: 325 TABLET, FILM COATED ORAL at 22:18

## 2025-05-11 RX ADMIN — PROPOFOL 20 MG: 10 INJECTION, EMULSION INTRAVENOUS at 08:13

## 2025-05-11 RX ADMIN — PHENYLEPHRINE HYDROCHLORIDE 100 MCG: 10 INJECTION INTRAVENOUS at 12:36

## 2025-05-11 RX ADMIN — VECURONIUM BROMIDE 2 MG: 1 INJECTION, POWDER, LYOPHILIZED, FOR SOLUTION INTRAVENOUS at 11:27

## 2025-05-11 RX ADMIN — PHENYLEPHRINE HYDROCHLORIDE 100 MCG: 10 INJECTION INTRAVENOUS at 11:42

## 2025-05-11 RX ADMIN — CEFAZOLIN SODIUM 2 G: 2 SOLUTION INTRAVENOUS at 08:30

## 2025-05-11 RX ADMIN — PROPOFOL 50 MCG/KG/MIN: 10 INJECTION, EMULSION INTRAVENOUS at 11:45

## 2025-05-11 RX ADMIN — HYDROMORPHONE HYDROCHLORIDE 0.5 MG: 1 INJECTION, SOLUTION INTRAMUSCULAR; INTRAVENOUS; SUBCUTANEOUS at 11:23

## 2025-05-11 RX ADMIN — Medication 0.5 UNITS: at 12:39

## 2025-05-11 RX ADMIN — PHENYLEPHRINE HYDROCHLORIDE 100 MCG: 10 INJECTION INTRAVENOUS at 10:25

## 2025-05-11 RX ADMIN — ACETAMINOPHEN 650 MG: 325 TABLET, FILM COATED ORAL at 13:57

## 2025-05-11 RX ADMIN — AMINOCAPROIC ACID 5 G/HR: 250 INJECTION, SOLUTION INTRAVENOUS at 08:51

## 2025-05-11 RX ADMIN — Medication 200 MG: at 12:45

## 2025-05-11 RX ADMIN — ALBUMIN (HUMAN): 12.5 SOLUTION INTRAVENOUS at 08:38

## 2025-05-11 RX ADMIN — PHENYLEPHRINE HYDROCHLORIDE 50 MCG: 10 INJECTION INTRAVENOUS at 11:35

## 2025-05-11 RX ADMIN — ASPIRIN 81 MG CHEWABLE TABLET 162 MG: 81 TABLET CHEWABLE at 19:30

## 2025-05-11 RX ADMIN — PROPOFOL 30 MG: 10 INJECTION, EMULSION INTRAVENOUS at 08:11

## 2025-05-11 RX ADMIN — PHENYLEPHRINE HYDROCHLORIDE 100 MCG: 10 INJECTION INTRAVENOUS at 11:36

## 2025-05-11 RX ADMIN — DEXMEDETOMIDINE HYDROCHLORIDE 0.2 MCG/KG/HR: 400 INJECTION INTRAVENOUS at 19:23

## 2025-05-11 RX ADMIN — PROPOFOL 20 MG: 10 INJECTION, EMULSION INTRAVENOUS at 11:20

## 2025-05-11 RX ADMIN — FENTANYL CITRATE 50 MCG: 50 INJECTION INTRAMUSCULAR; INTRAVENOUS at 09:35

## 2025-05-11 RX ADMIN — PHENYLEPHRINE HYDROCHLORIDE 100 MCG: 10 INJECTION INTRAVENOUS at 12:08

## 2025-05-11 RX ADMIN — INDOMETHACIN 50 MEQ: 25 CAPSULE ORAL at 13:48

## 2025-05-11 RX ADMIN — VASOPRESSIN 2.5 UNITS/HR: 20 INJECTION, SOLUTION INTRAVENOUS at 20:11

## 2025-05-11 RX ADMIN — MIDAZOLAM HYDROCHLORIDE 3 MG: 1 INJECTION, SOLUTION INTRAMUSCULAR; INTRAVENOUS at 10:21

## 2025-05-11 RX ADMIN — SODIUM CHLORIDE: 900 INJECTION INTRAVENOUS at 07:45

## 2025-05-11 RX ADMIN — OXYCODONE 5 MG: 5 TABLET ORAL at 19:34

## 2025-05-11 RX ADMIN — SENNOSIDES AND DOCUSATE SODIUM 1 TABLET: 50; 8.6 TABLET ORAL at 20:10

## 2025-05-11 RX ADMIN — EPINEPHRINE 0.04 MCG/KG/MIN: 1 INJECTION INTRAMUSCULAR; INTRAVENOUS; SUBCUTANEOUS at 12:59

## 2025-05-11 RX ADMIN — CEFAZOLIN 1 G: 1 INJECTION, POWDER, FOR SOLUTION INTRAMUSCULAR; INTRAVENOUS at 20:04

## 2025-05-11 RX ADMIN — INDOMETHACIN 50 MEQ: 25 CAPSULE ORAL at 16:14

## 2025-05-11 RX ADMIN — POTASSIUM CHLORIDE 10 MEQ: 7.46 INJECTION, SOLUTION INTRAVENOUS at 23:11

## 2025-05-11 RX ADMIN — PROPOFOL 60 MG: 10 INJECTION, EMULSION INTRAVENOUS at 11:30

## 2025-05-11 RX ADMIN — NOREPINEPHRINE BITARTRATE 0.06 MCG/KG/MIN: 0.02 INJECTION, SOLUTION INTRAVENOUS at 13:15

## 2025-05-11 RX ADMIN — ROCURONIUM BROMIDE 50 MG: 50 INJECTION, SOLUTION INTRAVENOUS at 09:17

## 2025-05-11 RX ADMIN — PHENYLEPHRINE HYDROCHLORIDE 100 MCG: 10 INJECTION INTRAVENOUS at 12:14

## 2025-05-11 RX ADMIN — ACETAMINOPHEN 975 MG: 325 TABLET, FILM COATED ORAL at 13:57

## 2025-05-11 RX ADMIN — GABAPENTIN 100 MG: 100 CAPSULE ORAL at 04:11

## 2025-05-11 RX ADMIN — DEXAMETHASONE SODIUM PHOSPHATE 8 MG: 4 INJECTION, SOLUTION INTRA-ARTICULAR; INTRALESIONAL; INTRAMUSCULAR; INTRAVENOUS; SOFT TISSUE at 08:18

## 2025-05-11 RX ADMIN — EPINEPHRINE 0.05 MCG/KG/MIN: 1 INJECTION INTRAMUSCULAR; INTRAVENOUS; SUBCUTANEOUS at 13:29

## 2025-05-11 RX ADMIN — SODIUM CHLORIDE: 9 INJECTION, SOLUTION INTRAVENOUS at 08:30

## 2025-05-11 RX ADMIN — LIDOCAINE HYDROCHLORIDE 40 MG: 20 INJECTION, SOLUTION INFILTRATION; PERINEURAL at 08:11

## 2025-05-11 RX ADMIN — FENTANYL CITRATE 150 MCG: 50 INJECTION INTRAMUSCULAR; INTRAVENOUS at 09:17

## 2025-05-11 RX ADMIN — VASOPRESSIN 0.5 UNITS/HR: 20 INJECTION, SOLUTION INTRAVENOUS at 14:36

## 2025-05-11 RX ADMIN — FENTANYL CITRATE 100 MCG: 50 INJECTION INTRAMUSCULAR; INTRAVENOUS at 09:59

## 2025-05-11 RX ADMIN — PHENYLEPHRINE HYDROCHLORIDE 100 MCG: 10 INJECTION INTRAVENOUS at 12:03

## 2025-05-11 RX ADMIN — SODIUM CHLORIDE, SODIUM LACTATE, POTASSIUM CHLORIDE, AND CALCIUM CHLORIDE 500 ML: .6; .31; .03; .02 INJECTION, SOLUTION INTRAVENOUS at 16:14

## 2025-05-11 RX ADMIN — PHENYLEPHRINE HYDROCHLORIDE 50 MCG: 10 INJECTION INTRAVENOUS at 10:14

## 2025-05-11 RX ADMIN — SODIUM CHLORIDE, SODIUM LACTATE, POTASSIUM CHLORIDE, AND CALCIUM CHLORIDE: .6; .31; .03; .02 INJECTION, SOLUTION INTRAVENOUS at 13:00

## 2025-05-11 RX ADMIN — NOREPINEPHRINE BITARTRATE 0.02 MCG/KG/MIN: 0.02 INJECTION, SOLUTION INTRAVENOUS at 08:33

## 2025-05-11 RX ADMIN — Medication 40 MG: at 13:57

## 2025-05-11 RX ADMIN — PHENYLEPHRINE HYDROCHLORIDE 100 MCG: 10 INJECTION INTRAVENOUS at 08:12

## 2025-05-11 RX ADMIN — SODIUM PHOSPHATE, MONOBASIC, MONOHYDRATE AND SODIUM PHOSPHATE, DIBASIC, ANHYDROUS 9 MMOL: 142; 276 INJECTION, SOLUTION INTRAVENOUS at 23:57

## 2025-05-11 RX ADMIN — EPINEPHRINE 0.02 MCG/KG/MIN: 1 INJECTION INTRAMUSCULAR; INTRAVENOUS; SUBCUTANEOUS at 11:14

## 2025-05-11 RX ADMIN — DEXMEDETOMIDINE HYDROCHLORIDE 0.3 MCG/KG/HR: 100 INJECTION, SOLUTION INTRAVENOUS at 08:40

## 2025-05-11 RX ADMIN — SODIUM BICARBONATE 50 MEQ: 84 INJECTION INTRAVENOUS at 13:48

## 2025-05-11 RX ADMIN — FENTANYL CITRATE 50 MCG: 50 INJECTION INTRAMUSCULAR; INTRAVENOUS at 08:11

## 2025-05-11 RX ADMIN — PHENYLEPHRINE HYDROCHLORIDE 100 MCG: 10 INJECTION INTRAVENOUS at 08:11

## 2025-05-11 RX ADMIN — PHENYLEPHRINE HYDROCHLORIDE 100 MCG: 10 INJECTION INTRAVENOUS at 12:04

## 2025-05-11 RX ADMIN — PHENYLEPHRINE HYDROCHLORIDE 100 MCG: 10 INJECTION INTRAVENOUS at 12:22

## 2025-05-11 RX ADMIN — SODIUM CHLORIDE: 9 INJECTION, SOLUTION INTRAVENOUS at 12:12

## 2025-05-11 RX ADMIN — ASPIRIN 81 MG CHEWABLE TABLET 162 MG: 81 TABLET CHEWABLE at 04:10

## 2025-05-11 RX ADMIN — SODIUM CHLORIDE, SODIUM LACTATE, POTASSIUM CHLORIDE, AND CALCIUM CHLORIDE 500 ML: .6; .31; .03; .02 INJECTION, SOLUTION INTRAVENOUS at 13:50

## 2025-05-11 RX ADMIN — INSULIN HUMAN 2.5 UNITS/HR: 1 INJECTION, SOLUTION INTRAVENOUS at 13:43

## 2025-05-11 RX ADMIN — PHENYLEPHRINE HYDROCHLORIDE 150 MCG: 10 INJECTION INTRAVENOUS at 10:22

## 2025-05-11 RX ADMIN — ROCURONIUM BROMIDE 50 MG: 50 INJECTION, SOLUTION INTRAVENOUS at 08:11

## 2025-05-11 RX ADMIN — FENTANYL CITRATE 50 MCG: 50 INJECTION INTRAMUSCULAR; INTRAVENOUS at 09:30

## 2025-05-11 RX ADMIN — PROTAMINE SULFATE 150 MG: 10 INJECTION, SOLUTION INTRAVENOUS at 11:21

## 2025-05-11 RX ADMIN — PROPOFOL 40 MCG/KG/MIN: 10 INJECTION, EMULSION INTRAVENOUS at 13:51

## 2025-05-11 RX ADMIN — FENTANYL CITRATE 100 MCG: 50 INJECTION INTRAMUSCULAR; INTRAVENOUS at 10:03

## 2025-05-11 ASSESSMENT — ACTIVITIES OF DAILY LIVING (ADL)
ADLS_ACUITY_SCORE: 42
ADLS_ACUITY_SCORE: 40
ADLS_ACUITY_SCORE: 40
ADLS_ACUITY_SCORE: 42
ADLS_ACUITY_SCORE: 40
ADLS_ACUITY_SCORE: 40
ADLS_ACUITY_SCORE: 42
ADLS_ACUITY_SCORE: 42
ADLS_ACUITY_SCORE: 40
ADLS_ACUITY_SCORE: 42
ADLS_ACUITY_SCORE: 40
ADLS_ACUITY_SCORE: 42
ADLS_ACUITY_SCORE: 40
ADLS_ACUITY_SCORE: 42
ADLS_ACUITY_SCORE: 40
ADLS_ACUITY_SCORE: 40
ADLS_ACUITY_SCORE: 42
ADLS_ACUITY_SCORE: 40

## 2025-05-11 ASSESSMENT — LIFESTYLE VARIABLES: TOBACCO_USE: 0

## 2025-05-11 ASSESSMENT — COPD QUESTIONNAIRES: COPD: 0

## 2025-05-11 NOTE — ANESTHESIA POSTPROCEDURE EVALUATION
Patient: Mikayla Sotomayor    Procedure: Procedure(s):  Bypass graft artery coronary       Anesthesia Type:  General    Note:  Disposition: ICU            ICU Sign Out: Anesthesiologist/ICU physician sign out WAS performed   Postop Pain Control: Uneventful            Sign Out: Well controlled pain   PONV: No   Neuro/Psych: Uneventful            Sign Out: PLANNED postop sedation   Airway/Respiratory:             Sign Out: AIRWAY IN SITU/Resp. Support               Airway in situ/Resp. Support: ETT                 Reason: Planned Pre-op   CV/Hemodynamics: Uneventful            Sign Out: Acceptable CV status   Other NRE: NONE   DID A NON-ROUTINE EVENT OCCUR? No           Last vitals:  Vitals:    05/11/25 0500 05/11/25 0600 05/11/25 0700   BP:      Pulse: 58 50 69   Resp: 17 15 19   Temp:   37.2  C (99  F)   SpO2: (!) 86% 98% 96%       Electronically Signed By: Haris Smith MD  May 11, 2025  12:48 PM

## 2025-05-11 NOTE — ANESTHESIA PROCEDURE NOTES
Central Line/PA Catheter Placement    Pre-Procedure   Staff -        Anesthesiologist:  Haris Smith MD       Performed By: anesthesiologist       Location: OR       Pre-Anesthestic Checklist: patient identified, IV checked, site marked, risks and benefits discussed, informed consent, monitors and equipment checked, pre-op evaluation and at physician/surgeon's request  Timeout:       Correct Patient: Yes        Correct Procedure: Yes        Correct Site: Yes        Correct Position: Yes        Correct Laterality: Yes   Line Placement:   This line was placed Post Induction    Procedure   Procedure: central line       Laterality: right       Insertion Site: right, internal jugular.       Patient Position: Trendelenburg  Sterile Prep        All elements of maximal sterile barrier technique followed       Patient Prep/Sterile Barriers: draped, hand hygiene, gloves , hat , mask , draped, gown, sterile gel and probe cover       Skin prep: Chloraprep  Insertion/Injection        Local skin infiltrated with 3 mL of 1% lidocaine.        Technique: ultrasound guided and Seldinger Technique        1. Ultrasound was used to evaluate the access site.       2. Vein evaluated via ultrasound for patency/adequacy.       3. Using real-time ultrasound the needle/catheter was observed entering the artery/vein.       4. Permanent image was captured and entered into the patient's record.       5. The visualized structures were anatomically normal.       6. There were no apparent abnormal pathologic findings.       Introducer Type: 9 Fr, 10 cm        Type: PA/CVC with Introducer       PA Catheter Type: Thermodilution         Appropriate RV, RA and PA waveforms noted:  Yes            Withdrawn and Locked at cm: 38            Balloon down at end of the procedure:   Narrative         Secured by: suture       Tegaderm and Biopatch dressing used.       Complications: None apparent,        blood aspirated from all lumens,        All  lumens flushed: Yes       Verification method: Ultrasound   Comments:  Ultrasound Interpretation central venous access    1. Ultrasound guidance was used to evaluate potential access sites.  2. Ultrasound was also used to verify the patency of the vessel specified above.   3. Ultrasound was used to visualize the needle entering the vessel.   4. The visualized structures were anatomically normal.  5. There were no apparent abnormal pathological findings.  6. A permanent ultrasound image was saved in the patient's record.

## 2025-05-11 NOTE — ANESTHESIA PROCEDURE NOTES
Airway       Patient location during procedure: OR       Procedure Start/Stop Times: 5/11/2025 8:13 AM  Staff -        CRNA: Phillip Salas APRN CRNA       Performed By: CRNA  Consent for Airway        Urgency: elective  Indications and Patient Condition       Indications for airway management: jigna-procedural       Induction type:intravenous       Mask difficulty assessment: 1 - vent by mask    Final Airway Details       Final airway type: endotracheal airway       Successful airway: ETT - single  Endotracheal Airway Details        ETT size (mm): 7.0       Cuffed: yes       Successful intubation technique: video laryngoscopy       VL Blade Size: Yusuf 3       Grade View of Cords: 1       Adjucts: stylet       Position: Right       Measured from: gums/teeth       Secured at (cm): 20       Bite block used: None    Post intubation assessment        Placement verified by: capnometry, equal breath sounds and chest rise        Number of attempts at approach: 1       Secured with: tape       Ease of procedure: easy       Dentition: Intact and Unchanged    Medication(s) Administered   Medication Administration Time: 5/11/2025 8:13 AM

## 2025-05-11 NOTE — DISCHARGE SUMMARY
"[facility]  Hospitalist Discharge Summary      Date of Admission:  5/9/2025  Date of Discharge:  5/10/2025 12:51 PM  Discharging Provider: Fawad Martinez MD  Discharge Service: Hospitalist Service    Discharge Diagnoses   NSTEMI    Clinically Significant Risk Factors     # Overweight: Estimated body mass index is 26.86 kg/m  as calculated from the following:    Height as of this encounter: 1.499 m (4' 11\").    Weight as of this encounter: 60.3 kg (133 lb).           Discharge Disposition   Transferred to [facility]  Condition at discharge: Stable    Hospital Course   84-year-old female with history of hypertension, anxiety and diabetes mellitus type 2 who developed central chest pain 2 days prior to presentation.  Initial troponin of 126 with significant ST depression in lateral limb and precordial leads.  proBNP elevated at 1460 with evidence of pulmonary edema on CT scan and fluid overload on exam.    NSTEMI:  Acute CHF:  Hypertension:  - Troponin continues to rise 126--> 2844.  - Patient was started on heparin drip, metoprolol 25 mg twice daily, Lipitor and aspirin 81 mg daily.  Echocardiogram showed EF of 35% and transferred to [facility] for an urgent coronary angiogram  - Holding enalapril, started on hydralazine 10 mg p.o. 3 times daily and hold for systolic blood pressure less than 120.    Severe right subclavian artery stenosis:  - Noted on CT aortogram.  - Can see vascular medicine after stable from cardiac standpoint..    ZANE on CKD stage III:  - Baseline creatinine of 1.3, presented about 1.7.  - Holding enalapril and creatinine stable.    Elevated AST:  - AST of 393 with normal ALT and bilirubin.  - This is secondary to myocardial injury.    Diet controlled diabetes mellitus type 2:  - On sliding scale insulin.    Consultations This Hospital Stay   PHARMACY IP CONSULT  CARDIOLOGY IP CONSULT  PHARMACY IP CONSULT    Code Status   Full Code    Time " Spent on this Encounter   I, Fawad Martinez MD, personally saw the patient today and spent less than or equal to 30 minutes discharging this patient.       Fawad Martinez MD  [facility] EMERGENCY DEPT  [address] [facility] [address]  Phone: [phone]  Fax: [phone]  ______________________________________________________________________    Physical Exam   Vital Signs:                    Weight: 133 lbs 0 oz  General Appearance: Alert awake and oriented x 3  Respiratory: Clear to auscultation  Cardiovascular: S1-S2 normal  GI: Soft and nontender  Skin: No rash  Other: No edema         Primary Care Physician   Paige Higginbotham    Discharge Orders   No discharge procedures on file.    Significant Results and Procedures   Most Recent 3 CBC's:  Recent Labs   Lab Test 05/11/25  1318 05/11/25  1150 05/11/25  1139 05/11/25  1138 05/11/25  0848 05/11/25  0544   WBC 27.2*  --   --  22.3*  --  11.1*   HGB 10.1* 7.1* 8.2* 8.1*   < > 11.2*   MCV 94  --   --  94  --  94     --   --  121*  --  173    < > = values in this interval not displayed.     Most Recent 3 BMP's:  Recent Labs   Lab Test 05/11/25  1630 05/11/25  1446 05/11/25  1334 05/11/25  1318 05/11/25  1150 05/11/25  1139 05/11/25  1138 05/11/25  0848 05/11/25  0544   NA  --   --   --  138 141 140 140   < > 138   POTASSIUM  --   --   --  4.2 3.4 4.3 4.4   < > 3.9   CHLORIDE  --   --   --  103  --   --  106  --  103   CO2  --   --   --  15*  --   --  20*  --  23   BUN  --   --   --  27.1*  --   --  28.1*  --  32.4*   CR  --   --   --  1.61*  --   --  1.67*  --  1.95*   ANIONGAP  --   --   --  20*  --   --  14  --  12   LISA  --   --   --  9.0  --   --  9.0  --  9.1   * 225* 238* 215* 126* 150* 152*   < > 115*    < > = values in this interval not displayed.     Most Recent 2 LFT's:  Recent Labs   Lab Test 05/11/25  1318 05/10/25  0742 05/09/25 2044   AST  --  393* 31   ALT 16 22 11   ALKPHOS 43 60 67   BILITOTAL 0.4 0.3 0.2   ,    Results for orders placed or performed during the hospital encounter of 05/09/25   CT Aortic Survey w Contrast    Narrative    EXAM: CT AORTIC SURVEY W CONTRAST  LOCATION: [facility]  DATE: 5/9/2025    INDICATION: Chest pain  COMPARISON: None.  TECHNIQUE: Noncontrast CT of the chest followed by CT angiogram chest abdomen pelvis during arterial phase of injection of IV contrast. 2D and 3D MIP reconstructions were performed by the CT technologist. Dose reduction techniques were used.   CONTRAST: 72mL Isovue 370    FINDINGS:   CT ANGIOGRAM CHEST, ABDOMEN, AND PELVIS: Cardiomegaly. Thoracic aorta is nonaneurysmal. Classic aortic arch arterial anatomy. Severe stenosis of the right subclavian artery origin. The right brachiocephalic and common carotid arteries are widely patent.   Left common carotid artery widely patent. Left subclavian artery widely patent. No evidence of thoracic aortic injury.    Severe atherosclerotic disease of the nonaneurysmal abdominal aorta. Separate origins of the common hepatic and splenic artery from the aorta, both vessels patent. SMA is patent. Single left and duplicated right renal arteries are patent. RUT is patent.  Bilateral iliac and visualized proximal femoral arteries patent.    LUNGS AND PLEURA: Moderate interstitial thickening throughout the bilateral lungs. Trace bilateral pleural effusions. Bronchovascular thickening. Findings compatible with pulmonary edema/fluid overload..    MEDIASTINUM/AXILLAE: No lymphadenopathy    CORONARY ARTERY CALCIFICATION: Severe.    HEPATOBILIARY: Normal.    PANCREAS: Normal.    SPLEEN: Normal.    ADRENAL GLANDS: Normal.    KIDNEYS/BLADDER: Moderate left renal parenchymal atrophy. No hydronephrosis.    BOWEL: Normal.    LYMPH NODES: No lymphadenopathy    PELVIC ORGANS: No pelvic masses    MUSCULOSKELETAL: Degenerative changes in the lumbar spine. No acute fracture or destructive bone lesion.      Impression    IMPRESSION:  1.   Findings compatible with moderate interstitial pulmonary edema/fluid overload.  2.  No acute vascular findings in the chest, abdomen, or pelvis.  3.  Heart is enlarged.  4.  Severe stenosis of the right subclavian artery origin     Echocardiogram Complete     Value    LVEF  35%    [facility]    [id number]  [id number]  [id number]  [id number]^CHARLEEN^KARLENE^KENDRICK     [facility]  Echocardiography Laboratory  [address] [place] [address]     Name: PATRICIA WEAVER  MRN: [id number]  : 1940  Study Date: 05/10/2025 08:54 AM  Age: 84 yrs  Gender: Female  Patient Location: [facility]  Reason For Study: Chest Pain  Ordering Physician: KARLENE VILLASEÑOR  Referring Physician: Paige Higginbotham APRN CNP  Performed By: Anay Yañez RDCS     BSA: 1.6 m2  Height: 59 in  Weight: 133 lb  HR: 73  BP: 148/85 mmHg  ______________________________________________________________________________  Procedure  Echocardiogram with two-dimensional, color and spectral Doppler. Optison (NDC  #[id number]) given intravenously.  ______________________________________________________________________________  Interpretation Summary     1. The left ventricle is normal in size. The visual ejection fraction is  estimated at 35%. Distal anteroseptal, anterolateral, inferior, and apical  hypokinesis.  2. The right ventricle is normal in structure, function and size.  3. There is mild to moderate (1-2+) mitral regurgitation.  4. There is mild to moderate (1-2+) tricuspid regurgitation.  5. Severe (>55mmHg) pulmonary hypertension is present. The right ventricular  systolic pressure is approximated at 59mmHg plus the right atrial pressure.     Echo 2018 showed EF 60%, normal wall motion, no valve disease.     Patient went to cath lab, found to have left main disease.  ______________________________________________________________________________  Left Ventricle  The left ventricle is normal in size. There is mild  concentric left  ventricular hypertrophy. The visual ejection fraction is estimated at 35%.  Grade I or early diastolic dysfunction. Distal anteroseptal, anterolateral,  inferior, and apical hypokinesis. There is no thrombus seen in the left  ventricle.     Right Ventricle  The right ventricle is normal in structure, function and size.     Atria  The left atrium is mild to moderately dilated. The right atrium is mildly  dilated. There is no atrial shunt seen.     Mitral Valve  There is mild to moderate (1-2+) mitral regurgitation. Calcification of  posterior mitral leaflet, at most mild stenosis with mean 3mmHg.     Tricuspid Valve  There is mild to moderate (1-2+) tricuspid regurgitation. Severe (>55mmHg)  pulmonary hypertension is present. The right ventricular systolic pressure is  approximated at 59mmHg plus the right atrial pressure.     Aortic Valve  There is mild trileaflet aortic sclerosis.     Pulmonic Valve  The pulmonic valve is not well visualized.     Vessels  Normal ascending, transverse (arch), and descending aorta. The inferior vena  cava was normal in size with preserved respiratory variability.     Pericardium  There is no pericardial effusion.     Rhythm  Sinus rhythm was noted.  ______________________________________________________________________________  MMode/2D Measurements & Calculations  IVSd: 1.2 cm     LVIDd: 5.0 cm  LVIDs: 3.0 cm  LVPWd: 1.1 cm  IVC diam: 1.2 cm  FS: 40.5 %  LV mass(C)d: 216.5 grams  LV mass(C)dI: 139.6 grams/m2  Ao root diam: 3.0 cm  LA dimension: 4.3 cm  asc Aorta Diam: 3.5 cm  LA/Ao: 1.4  LVOT diam: 1.8 cm  LVOT area: 2.6 cm2  Ao root diam index Ht(cm/m): 2.0  Ao root diam index BSA (cm/m2): 2.0  Asc Ao diam index BSA (cm/m2): 2.3  Asc Ao diam index Ht(cm/m): 2.4  EF Biplane: 36.6 %  LA Volume (BP): 65.4 ml     LA Volume Index (BP): 42.2 ml/m2  RV Base: 3.1 cm  RWT: 0.44  TAPSE: 1.7 cm     Doppler Measurements & Calculations  MV E max lanette: 111.0 cm/sec  MV A max lanette:  87.4 cm/sec  MV E/A: 1.3  MV max P.1 mmHg  MV mean PG: 3.0 mmHg  MV V2 VTI: 42.1 cm  MV dec slope: 558.0 cm/sec2  MV dec time: 0.20 sec  Ao V2 max: 121.5 cm/sec  Ao max P.0 mmHg  MR PISA: 3.5 cm2  MR ERO: 0.21 cm2  MR volume: 36.4 ml  PA acc time: 0.12 sec  TR max jerrod: 369.0 cm/sec  TR max P.5 mmHg  E/E' av.7     Lateral E/e': 12.6  Medial E/e': 26.9  RV S Jerrod: 10.6 cm/sec     ______________________________________________________________________________  Report approved by: Tyree Hartley MD on 05/10/2025 01:40 PM             Discharge Medications   Discharge Medication List as of 5/10/2025 12:51 PM        CONTINUE these medications which have NOT CHANGED    Details   ASPIRIN 81 MG OR TABS Take 81 mg by mouth 2 times daily., Disp-100, R-3, Historical      Calcium-Magnesium-Vitamin D (CALCIUM MAGNESIUM PO) Take 1 tablet by mouth daily., Historical      Cyanocobalamin (B-12) 1000 MCG TBCR Take 1,000 mcg by mouth daily, Disp-100 tablet, R-1, Historical      enalapril (VASOTEC) 20 MG tablet Take 1 tablet (20 mg) by mouth 2 times daily., Disp-180 tablet, R-3, E-Prescribe      hydrALAZINE (APRESOLINE) 10 MG tablet Take 1 tablet (10 mg) by mouth 3 times daily., Disp-90 tablet, R-1, E-Prescribe      hydrochlorothiazide (HYDRODIURIL) 25 MG tablet TAKE 1/2 TABLET BY MOUTH DAILY, Disp-45 tablet, R-3, E-Prescribe      hydrOXYzine HCl (ATARAX) 10 MG tablet Take 1 tablet (10 mg) by mouth 2 times daily as needed for itching or anxiety., Disp-60 tablet, R-1, E-Prescribe      Multiple Vitamins-Minerals (MULTIVITAMIN & MINERAL) LIQD Take 2 oz by mouth daily., 2 oz, Oral, DAILY, Until Discontinued, Historical      VITAMIN D, CHOLECALCIFEROL, PO Take 5 drops by mouth daily., 5 drop, Oral, DAILY, Until Discontinued, Historical           Allergies   Allergies   Allergen Reactions    Propoxyphene Hcl      darvon-tylenol #3

## 2025-05-11 NOTE — ANESTHESIA PREPROCEDURE EVALUATION
Anesthesia Pre-Procedure Evaluation    Patient: Mikayla Sotomayor   MRN: [id number] : 1940          Procedure : Procedure(s):  Bypass graft artery coronary         Past Medical History:   Diagnosis Date    Essential hypertension, benign     Fam hx-cardiovas dis NEC     Family history of diabetes mellitus     Mitral valve disorders(424.0) 3/5/2014    Under care of  Dr Richelle Toledo MD, consultant in cardiology     Osteopenia     Premature atrial complexes 3/5/2014    Under care of  Dr Richelle Toledo MD, consultant in cardiology       Past Surgical History:   Procedure Laterality Date    DRAINAGE OF HEMATOMA/FLUID      TEST NOT FOUND      Cardiology    Thal. stess test  reported to the patient as normal    ZZC ANESTH,SURG BREAST RECONSTRUCTIVE      L breast    ZZC APPENDECTOMY      age 5    ZZC C-SEC ONLY,PREV C-SEC  -    ZZC DISCISSION,2ND CATARACT,INCIS  2008    Z EYE EXAM ESTABLISHED PT      ZZ COLONOSCOPY THRU STOMA, DIAGNOSTIC  2007      Allergies   Allergen Reactions    Propoxyphene Hcl      darvon-tylenol #3      Social History     Tobacco Use    Smoking status: Never    Smokeless tobacco: Never   Substance Use Topics    Alcohol use: Yes     Alcohol/week: 0.0 standard drinks of alcohol     Comment: rare       Wt Readings from Last 1 Encounters:   25 57.4 kg (126 lb 8.7 oz)        Anesthesia Evaluation            ROS/MED HX  ENT/Pulmonary:    (-) tobacco use, asthma, COPD and sleep apnea   Neurologic:  - neg neurologic ROS     Cardiovascular: Comment: IABP    (+) Dyslipidemia hypertension- -  CAD angina-with excertion. past MI - -                                pulmonary hypertension, Previous cardiac testing   Echo: Date: 25 Results:  Echocardiogram, 2025: Interpretation Summary     1. The left ventricle is normal in size. The visual ejection fraction is  estimated at 35%. Distal anteroseptal, anterolateral, inferior, and apical  hypokinesis.  2. The  right ventricle is normal in structure, function and size.  3. There is mild to moderate (1-2+) mitral regurgitation.  4. There is mild to moderate (1-2+) tricuspid regurgitation.  5. Severe (>55mmHg) pulmonary hypertension is present. The right ventricular  systolic pressure is approximated at 59mmHg plus the right atrial pressure.     Echo 2018 showed EF 60%, normal wall motion, no valve disease.    Stress Test:  Date: Results:    ECG Reviewed:  Date: Results:    Cath:  Date: 5/10/25 Results:  STEMI     Conclusion          2nd Mrg lesion is 80% stenosed.     Mid LAD to Dist LAD lesion is 70% stenosed.     Ost LM to Dist LM lesion is 99% stenosed.     RPDA lesion is 40% stenosed.     Ost RCA to Prox RCA lesion is 50% stenosed.      METS/Exercise Tolerance:     Hematologic:       Musculoskeletal:       GI/Hepatic:    (-) GERD and esophageal disease   Renal/Genitourinary:     (+) renal disease, type: CRI,            Endo:     (+)  type II DM,   Not using insulin,                 Psychiatric/Substance Use:  - neg psychiatric ROS     Infectious Disease:       Malignancy:    (-) malignancy   Other:              Physical Exam  Airway  Cardiovascular  Comments: IABP  Dental     Pulmonary       Neurological   Other Findings       OUTSIDE LABS:  CBC:   Lab Results   Component Value Date    WBC 11.1 (H) 05/10/2025    WBC 12.4 (H) 05/10/2025    HGB 11.2 (L) 05/11/2025    HGB 11.8 05/10/2025    HCT 34.5 (L) 05/11/2025    HCT 34.7 (L) 05/10/2025     05/11/2025     05/10/2025     BMP:   Lab Results   Component Value Date     05/11/2025     05/10/2025    POTASSIUM 3.9 05/11/2025    POTASSIUM 4.4 05/10/2025    CHLORIDE 103 05/11/2025    CHLORIDE 102 05/10/2025    CO2 23 05/11/2025    CO2 22 05/10/2025    BUN 32.4 (H) 05/11/2025    BUN 28.6 (H) 05/10/2025    CR 1.95 (H) 05/11/2025    CR 1.66 (H) 05/10/2025     (H) 05/11/2025     (H) 05/10/2025     COAGS:   Lab Results   Component Value Date  "   PTT 33 05/10/2025    INR 1.06 05/10/2025    FIBR 412 05/10/2025     POC: No results found for: \"BGM\", \"HCG\", \"HCGS\"  HEPATIC:   Lab Results   Component Value Date    ALBUMIN 3.9 05/10/2025    PROTTOTAL 6.8 05/10/2025    ALT 22 05/10/2025     (H) 05/10/2025    ALKPHOS 60 05/10/2025    BILITOTAL 0.3 05/10/2025     OTHER:   Lab Results   Component Value Date    PH 6.0 09/06/2012    A1C 6.0 (H) 05/10/2025    LISA 9.1 05/11/2025    PHOS 4.5 05/11/2025    MAG 2.2 05/11/2025    TSH 2.27 01/07/2019    T4 1.1 09/06/2012       Anesthesia Plan    ASA Status:  4, emergent      NPO Status: NPO Appropriate   Anesthesia Type: General.   Induction: intravenous.   Techniques and Equipment:     - Airway:    Arterial Line Kit: Radial     Consents    Anesthesia Plan(s) and associated risks, benefits, and realistic alternatives discussed. Questions answered and patient/representative(s) expressed understanding.     - Discussed:     - Discussed with:  Patient       Blood Consent:      - Discussed with: patient.     - Consented: consented to blood products     Postoperative Care            Comments:    Other Comments: Induction with Fentanyl, Versed, Propofol and Rocuronium.  Plan to infuse Epinephrine  and Norepinephrine for separation from cardiopulmonary bypass.   DIANA for monitoring purposes only. Risks considered.   Will plan to transfer to ICU on full monitors with ETT in situ.   Propofol gtt for post operative sedation.     Multi Model Analgesia:  -Tylenol 1000 mg po.  -Precedex gtt at 0.2-0.5 mcg/kg/hour  -Decadron 8mg IV after induction.   -Dilaudid titrated prn.                  Haris Smith MD    I have reviewed the pertinent notes and labs in the chart from the past 30 days and (re)examined the patient.  Any updates or changes from those notes are reflected in this note.    Clinically Significant Risk Factors Present on Admission                 # Drug Induced Platelet Defect: home medication list includes an " "antiplatelet medication   # Hypertension: Noted on problem list  # Chronic heart failure with reduced ejection fraction: last echo with EF <40%          # Overweight: Estimated body mass index is 25.56 kg/m  as calculated from the following:    Height as of 5/9/25: 1.499 m (4' 11\").    Weight as of this encounter: 57.4 kg (126 lb 8.7 oz).                    "

## 2025-05-11 NOTE — CONSULTS
CARDIAC SURGERY NUTRITION CONSULT     Received standing order to assess and educate patient.  Will follow and complete assessment once patient is extubated and/or is transferred to medical unit.     Patient will receive nutrition education during the Outpatient Cardiac Rehab Program (nutrition classes/dietitian counseling).    Urmila Duarte RD, LD, CNSC   Available on [employer]

## 2025-05-11 NOTE — PLAN OF CARE
Goal Outcome Evaluation:      Plan of Care Reviewed With: patient, spouse, child    Overall Patient Progress: no changeOverall Patient Progress: no change    Arrived to ICU from Cath lab around 15:40 with IABP 1:1 Augmentation/Pump Mean ~ 150's/70-80's mmHg, HR 50-70's SR with occasional PVC's.  Endorses mild pain/discomfort at site of IAPB. Heparin infusion with Hep 10 A checks.  Distal pulses palpable when feet warm and dopplered with cold feet.  Denies parathesias.  A/O x 4.Bladder scan 465 ml's.  Navarro cath will be placed by next RN.  Plan for CABG with Dr. Guzman in the am.  Daughters at bedside.  NPO at midnight.

## 2025-05-11 NOTE — ANESTHESIA PROCEDURE NOTES
Arterial Line Procedure Note    Pre-Procedure   Staff -        Performed By: anesthesiologist       Location: OR       Pre-Anesthestic Checklist: patient identified, IV checked, risks and benefits discussed, informed consent, monitors and equipment checked, pre-op evaluation and at physician/surgeon's request  Timeout:       Correct Patient: Yes        Correct Procedure: Yes        Correct Site: Yes        Correct Position: Yes   Line Placement:   This line was placed Pre Induction starting at 5/11/2025 7:45 AM and ending at 5/11/2025 7:55 AM  Procedure   Procedure: arterial line       Laterality: right       Insertion Site: radial.  Sterile Prep        All elements of maximal sterile barrier technique followed       Patient Prep/Sterile Barriers: hand hygiene, sterile gloves, hat, mask, draped, sterile gown, draped       Skin prep: Chloraprep  Insertion/Injection        Technique: Seldinger Technique, ultrasound guided and Sven's test completed        1. Ultrasound was used to evaluate the access site.       2. Artery evaluated via ultrasound for patency/adequacy.       3. Using real-time ultrasound the needle/catheter was observed entering the artery/vein.       4. Permanent image was captured and entered into the patient's record.       5. The visualized structures were anatomically normal.       6. There were no apparent abnormal pathologic findings.       Catheter Type/Size: 20 gauge, 1.75 in/4.5 cm quick cath (integral wire)  Narrative         Secured by: other       Tegaderm dressing used.       Complications: None apparent,        Arterial waveform: Yes        IBP within 10% of NIBP: Yes   Comments:  Ultrasound was used to place arterial line due to Art Line US Reasons: unable to palpate artery.

## 2025-05-11 NOTE — PROGRESS NOTES
Cardiology Progress Note    84F presented with high risk NSTEMI. Angiography demonstrated 99% LM disease. CV Surgical consult and plans for CABG today. Cardiology will sign off. Please re-consult once through post-op recovery and closer to dismissal for medical management of ischemic CM and CAD optimization

## 2025-05-11 NOTE — BRIEF OP NOTE
[facility]    Brief Operative Note    Pre-operative diagnosis: CAD  Post-operative diagnosis Same as pre-operative diagnosis    Procedure: Bypass graft artery coronary, N/A - Heart    Surgeon: Surgeons and Role:     * Gabe Guzman MD - Primary     * Elodia Joya PA-C - Assisting  Anesthesia: General   Estimated Blood Loss: 240 ml    Drains: Two mediastinal, one left pleural  Specimens:   ID Type Source Tests Collected by Time Destination   A :  Blood Line, arterial CBC WITH PLATELETS, BASIC METABOLIC PANEL, FIBRINOGEN ACTIVITY, PARTIAL THROMBOPLASTIN TIME, INR Gabe Guzman MD 5/11/2025 11:38 AM      Findings:   CABGx2 (LIMA-LAD, SVG-OM).  Complications: None.  Implants: * No implants in log *

## 2025-05-11 NOTE — PLAN OF CARE
Goal Outcome Evaluation:  Progressing                    CV  Pressors (which pressors and any increase/decrease in pressor needs): Epi 0.01, Vasopressin 2.5, noerpi 0.04.  Out of OR on epi & levo; added vaso & titrated down levo & epi. CO 3.6/CI 2.3, CVP 16  HR range: 70-80s  Chest tube output: 70cc since OR    Neuro  Orientation: intubated; following commands.  Delirium present?(y/n): AMRIT  Sleep: resting between cares  Pain: denies    GI/  BM? (y/n): N  Urine output: 255cc in 6hrs      Lines:  RIJ SG & introducer; right radial charlette, L PIV.     Pt acidotic on ABGs/VBGs; LA 3.3, 5.1, 4.4.  Labs Q2hrs x 3.  2 amps sodium bicarb; LR bolus 500cc x 2.  R groin site c/d/I. Messaging Dr. Guzman via computer w/updates & orders; Dr. Parkinson also updated on labs.  Family updated at bedside & via phone @ 1800

## 2025-05-11 NOTE — ANESTHESIA CARE TRANSFER NOTE
Patient: Mikayla Sotomayor    Procedure: Procedure(s):  Bypass graft artery coronary       Diagnosis: ST elevation [R94.31]  Diagnosis Additional Information: No value filed.    Anesthesia Type:   General     Note:    Oropharynx: endotracheal tube in place  Level of Consciousness: iatrogenic sedation      Independent Airway: airway patency satisfactory and stable  Dentition: dentition unchanged  Vital Signs Stable: post-procedure vital signs reviewed and stable  Report to RN Given: handoff report given  Patient transferred to: ICU    ICU Handoff: Call for PAUSE to initiate/utilize ICU HANDOFF, Identified Patient, Identified Responsible Provider, Reviewed the Pertinent Medical History, Discussed Surgical Course, Reviewed Intra-OP Anesthesia Management and Issues during Anesthesia, Set Expectations for Post Procedure Period and Allowed Opportunity for Questions and Acknowledgement of Understanding        Electronically Signed By: KOMAL West CRNA  May 11, 2025  12:47 PM

## 2025-05-11 NOTE — PROGRESS NOTES
"ICU Progress Note    Assessment and Plan:  Mikayla Sotomayor is a 84 year old female with a history of hypertension, CKD stage 3, hyperlipidemia, and type 2 diabetes mellitus who presents with NSTEMI. She went to the cath lab with Dr. Dent on 5/10/2025 and had IABP placed. A CABG is planned for 5/11/2025. The patient was admitted to the ICU for medical management.    Neuro:  Post-procedure pain management  - PRN Tylenol, oxycodone, and Dilaudid    CV:  NSTEMI  Multivessel CAD  Hypertension  Hyperlipidemia  - Balloon pump in place  - heparin drip on hold for CABG  - Cardiology following, appreciate recommendations  - CVTS consulted, plan for CABG today with Dr. Guzman  - Continue metoprolol, statin, and aspirin    Pulm:  No acute issues  - Maintain O2 saturation >88%.  - Will assess for extubation after CABG    GI:  No acute issues    Renal:  History of CKD stage 3  ZANE on CKD, Cr of 1.95 today, likely hypoperfusion from NSTEMI  - Monitor I/O, external catheter in place  - Avoid nephrotoxic agents  - Daily BMP ordered    ID:  No acute issues  - Monitor leukocytosis trend and fever    Heme:  Leukocytosis, likely reactive  - Daily CBC ordered    Endo:  Type 2 diabetes mellitus  Risk for stress hyperglycemia  - POCs with meals and at bedtime  - Maintain glucose <180  - Updated hemoglobin A1c ordered (last 6.7% in 2024)    PPx:  1. DVT: heparin drip   2. VAP:N/A  3. Stress Ulcer: N/A  4. Restraints: N/A  5. Wound care - per unit routine   6. Feeding - NPO for surgery    Clinically Significant Risk Factors Present on Admission                 # Drug Induced Platelet Defect: home medication list includes an antiplatelet medication   # Hypertension: Noted on problem list  # Chronic heart failure with reduced ejection fraction: last echo with EF <40%          # Overweight: Estimated body mass index is 25.56 kg/m  as calculated from the following:    Height as of 5/9/25: 1.499 m (4' 11\").    Weight as of this encounter: 57.4 kg " (126 lb 8.7 oz).                  Dispo: ICU    ------------------------------------------------------------------------------------------------------------------  HPI: Mikayla Sotomayor is a 84 year old female with a history of hypertension and type 2 diabetes mellitus who presents with NSTEMI. She went to the cath lab with Dr. Dent on 5/10/2025 and had IABP placed. A CABG is planned for 5/11/2025. The patient was admitted to the ICU for medical management.    Reportedly, the patient has had some arm pain that comes and goes, but developed chest pain over the last couple of days. It worsened last night and she was also nauseated, vomiting, and had diaphoresis. She presented to the ED and was found to have ST depression on EKG and thus was started on a heparin drip. She was also hypertensive.    Currently, she denies any pain. No shortness of breath. Just feels chilly.    Overnight events:  No acute events overnight. Balloon pump remains in place.     BP (!) 142/79 (BP Location: Right arm)   Pulse 69   Temp 99  F (37.2  C) (Bladder)   Resp 19   Wt 57.4 kg (126 lb 8.7 oz)   SpO2 96%   BMI 25.56 kg/m      Resp: 19    No intake or output data in the 24 hours ending 05/10/25 1605    Physical Exam:  Constitutional: healthy, alert, and no distress  Head: Normocephalic.   Cardiovascular: RRR.  IABP in place to right groin.  Respiratory: Good diaphragmatic excursion.  Gastrointestinal: Abdomen soft,   Neurologic: Aox4. No deficits noted.  Psychiatric: Mentation appears normal and affect normal/bright.    ______________________________________________    Labs: reviewed.     Soraya Guzman MD  Critical Care Time: 35 minutes

## 2025-05-11 NOTE — CONSULTS
Cardiac Surgery Consultation      Mikayla Sotomayor MRN# [id number]   YOB: 1940 Age: 84 year old      Reason for consult: Critical left main stenosis     Primary Cardiologist: Dr. Hartley       Assessment and Plan:   Ms. Mikayla Sotomayor is an 84-year-old year old female with critical left main and multivessel coronary artery disease.     Dr. Dent and I personally discussed this case. Lesion is heavily calcified and ostial in location; given the lack of a good PCI option, and the patients' relatively good functional status prior to presentation, we believe coronary artery bypass grafting to be the safer option for coronary revascularization, despite being more invasive.    I discussed this with the patient and her family and recommended urgent coronary artery bypass grafting. We described the technical details, as well as the expected postoperative course and recovery to the patient. We also discussed the risks and benefits of the procedure. The risks include but are not limited to bleeding, infection, stroke, heart or graft failure with myocardial infarction, dysrhythmia, respiratory failure, kidney or liver injury, and death.     After carefully considering the above, the patient understands these risks and wishes to undergo the operation.     Plan    -urgent CABG; intra-aortic balloon pump placed.  -pre-op evaluation in progress         History of Present Illness:   This patient is a 84 year old female with a PMHx significant for chronic kidney disease, and hypertension, who presented with chest pain and was found to have a STEMI. Urgent coronary angiography demonstrated a heavily calcified critical left main lesion in addition to multi-vessel coronary artery disease.     An intra-aortic balloon pump was placed and CV surgery was consulted. Patient is currently chest pain free. She reports being functionally independent prior to surgery and performed ADL's without difficulty.        Operative  Mortality 34.4%  Morbidity & Mortality 57.5%  Stroke 4.14%  Renal Failure 32.2%  Reoperation 6.57%  Prolonged Ventilation 45.2%  Deep Sternal Wound Infection 0.221%  Long Hospital Stay (>14 days) 30.3%  Short Hospital Stay (<6 days)* 5.93%    Clinical Summary  Planned Surgery: Isolated CABG, Urgent, First cardiovascular surgery  Demographics: 84 year old, female, 57kg, 150cm, BMI: 25.3 kg/m   Lab Values: Creatinine: 1.9 mg/dL, Hematocrit: 35%, WBC Count: 11 10 /?L, Platelet Count: 494883 cells/?L  Substance Abuse: Never smoker  Risk Factors / Comorbidities: Hypertension  Cardiac Status: NYHA Class II, Preop IABP, Ejection Fraction = 35%  Coronary Artery Disease: 3 vessels diseased, Left Main Stenosis >= 50%, Proximal LAD Stenosis >= 70%, STEMI, MI: >6 Hrs but <24 Hrs  Valve Disease: Moderate MR, Moderate TR                Past Medical History:     Past Medical History:   Diagnosis Date    Essential hypertension, benign     Fam hx-cardiovas dis NEC     Family history of diabetes mellitus     Mitral valve disorders(424.0) 3/5/2014    Under care of  Dr Richelle Toledo MD, consultant in cardiology     Osteopenia     Premature atrial complexes 3/5/2014    Under care of  Dr Richelle Toledo MD, consultant in cardiology              Past Surgical History:     Past Surgical History:   Procedure Laterality Date    DRAINAGE OF HEMATOMA/FLUID  1980    TEST NOT FOUND  March 03    Cardiology    Thal. stess test  reported to the patient as normal    [facility] ANESTH,SURG BREAST RECONSTRUCTIVE      L breast    Z APPENDECTOMY      age 5    Z C-SEC ONLY,PREV C-SEC  1965-68    Z DISCISSION,2ND CATARACT,INCIS  1/2008    [facility] EYE EXAM ESTABLISHED PT  2012    [facility]  1[facility]               Social History:     Social History     Tobacco Use    Smoking status: Never    Smokeless tobacco: Never   Substance Use Topics    Alcohol use: Yes     Alcohol/week: 0.0 standard drinks of alcohol     Comment: rare               Family History:     Family History   Problem Relation Age of Onset    Hypertension Mother     Gastrointestinal Disease Mother         diverticulosis    Diabetes Father     Heart Disease Father     Hypertension Sister     Diabetes Brother     Breast Cancer No family hx of     Cancer - colorectal No family hx of              Medications:     Current Facility-Administered Medications   Medication Dose Route Frequency Provider Last Rate Last Admin    [Auto Hold] acetaminophen (TYLENOL) tablet 650 mg  650 mg Oral Q4H PRN Constantin Ovalle MD        aminocaproic acid (AMICAR) 5 g in sodium chloride 0.9 % 100 mL bolus  5 g Intravenous Once Bridget Jolley NP        aminocaproic acid (AMICAR) 5 g in sodium chloride 0.9 % 100 mL infusion  1 g/hr Intravenous Continuous Bridget Jolley NP        ceFAZolin (ANCEF) 2 g in dextrose 50 mL intermittent infusion  2 g Intravenous See Admin Instructions Bridget Jolley NP        dexmedeTOMIDine (PRECEDEX) 4 mcg/mL in sodium chloride 0.9 % 100 mL infusion  0.1-1.2 mcg/kg/hr Intravenous Continuous Bridget Jolley NP        [Auto Hold] glucose gel 15-30 g  15-30 g Oral Q15 Min PRN Chuck Devi, [facility]        Or    [Auto Hold] dextrose 50 % injection 25-50 mL  25-50 mL Intravenous Q15 Min PRN Chuck Devi [facility]        Or    [Auto Hold] glucagon injection 1 mg  1 mg Subcutaneous Q15 Min PRN Chuck Devi [facility]        EPINEPHrine (ADRENALIN) 5 mg in  mL infusion  0.01-0.3 mcg/kg/min Intravenous Continuous Bridget Jolley NP        [Auto Hold] fentaNYL (PF) (SUBLIMAZE) injection 25 mcg  25 mcg Intravenous Q15 Min PRN Constantin Ovalle MD        heparin (porcine) 10,000 Units, magnesium sulfate 1 g, mannitol 12.5 g, sodium bicarbonate 50 mEq in sodium chloride 0.9 % 239.5 mL PUMP PRIME solution   PERFUSION On Call to OR Bridget Jolley NP        heparin (porcine) 30,000 Units in sodium chloride 0.9% 1000 mL PERFUSION solution (cell saver)   PERFUSION On Call to OR Bridget Jolley NP         [Held by provider] heparin 25,000 units in 0.45% NaCl 250 mL ANTICOAGULANT infusion  0-5,000 Units/hr Intravenous Continuous Constantin Ovalle MD   Stopped at 05/11/25 0329    HOLD:  All AM Medications   Does not apply HOLD Bridget Jolley NP        HOLD:  Metformin and metformin containing medications if patient received IV contrast with acute kidney injury or severe chronic kidney disease (stage IV or stage V; i.e., eGFR less than 30)   Does not apply HOLD Constantin Ovalle MD        HOLD: heparin   Does not apply HOLD Bridget Jolley NP        [Auto Hold] HYDROmorphone (DILAUDID) injection 0.1 mg  0.1 mg Intravenous Q2H PRN Mely Saini MD   0.1 mg at 05/10/25 2004    insulin regular (MYXREDLIN) 1 unit/mL infusion  0-24 Units/hr Intravenous Continuous Bridget Jolley NP        lidocaine (LMX4) cream   Topical Q1H PRN Bridget Jolley NP        lidocaine 1 % 0.1-1 mL  0.1-1 mL Other Q1H PRN Bridget Jolley NP        lidocaine 200 mg, magnesium sulfate 1 g, mannitol 12.5 g, sodium bicarbonate 20 mEq CARDIOPLEGIA (Hot Shot) solution   PERFUSION On Call to OR Bridget Jolley NP        [Auto Hold] melatonin tablet 3 mg  3 mg Oral At Bedtime PRN Mely Saini MD   3 mg at 05/10/25 2224    [Auto Hold] midazolam (VERSED) injection 0.5 mg  0.5 mg Intravenous Q5 Min PRN Constantin Ovalle MD        niCARdipine 40 mg in 200 mL NS (CARDENE) infusion  0.5-15 mg/hr Intravenous Continuous Bridget Jolley NP        norepinephrine (LEVOPHED) 4 mg in  mL infusion PREMIX  0.01-0.1 mcg/kg/min Intravenous Continuous Bridget Jolley NP        [Auto Hold] oxyCODONE IR (ROXICODONE) half-tab 2.5 mg  2.5 mg Oral Q4H PRN Mely Saini MD   2.5 mg at 05/10/25 1859    Or    [Auto Hold] oxyCODONE (ROXICODONE) tablet 5 mg  5 mg Oral Q4H PRN Mely Saini MD        phenylephrine (DAILY-SYNEPHRINE) 50 mg in NaCl 0.9 % 250 mL infusion  0.1-6 mcg/kg/min Intravenous Continuous Bridget Jolley, NP        potassium chloride 11.6 mEq, mannitol 25  % 7.2 g, magnesium sulfate 1.2 g, sodium bicarbonate 14.5 mEq in sodium chloride 0.9 % CARDIOPLEGIA solution   PERFUSION On Call to OR Bridget Jolley NP        potassium chloride 60 mEq, mannitol 25 % 7.5 g, magnesium sulfate 1.2 g, sodium bicarbonate 15 mEq in sodium chloride 0.9 % CARDIOPLEGIA solution   PERFUSION On Call to OR Bridget Jolley NP        sodium chloride (PF) 0.9% PF flush 3 mL  3 mL Intracatheter Q8H Bridget Jolley NP        sodium chloride (PF) 0.9% PF flush 3 mL  3 mL Intracatheter q1 min prn Bridget Jolley NP        sodium chloride 0.9 % infusion   Intravenous Continuous Constantin Ovalle MD   Stopped at 05/11/25 0329    sodium chloride 0.9 % infusion   Intravenous Continuous Constantin Ovalle MD         Facility-Administered Medications Ordered in Other Encounters   Medication Dose Route Frequency Provider Last Rate Last Admin    albumin human 5 % injection   Intravenous Continuous PRN Phillip Salas APRN CRNA   New Bag at 05/11/25 0838    dexAMETHasone (DECADRON) injection   Intravenous PRN Phillip Salas APRN CRNA   8 mg at 05/11/25 0818    dexmedeTOMIDine (PRECEDEX) 4 mcg/mL in sodium chloride 0.9 % 100 mL infusion   Intravenous Continuous PRN Phillip Salas APRN CRNA 4.305 mL/hr at 05/11/25 0840 0.3 mcg/kg/hr at 05/11/25 0840    fentaNYL (PF) (SUBLIMAZE) injection   Intravenous PRN Phillip Salas APRN CRNA   50 mcg at 05/11/25 0811    lactated ringers infusion   Intravenous Continuous PRN Phillip Salas APRN CRNA   New Bag at 05/11/25 0830    lidocaine 2% injection (MDV)   Intravenous PRN Phillip Salas APRN CRNA   40 mg at 05/11/25 0811    midazolam (VERSED) injection   Intravenous PRN Phillip Salas APRN CRNA   2 mg at 05/11/25 0805    norepinephrine (LEVOPHED) 4 mg in  mL infusion PREMIX   Intravenous Continuous PRN Phillip Salas APRN CRNA 2.153 mL/hr at 05/11/25 0837 0.01 mcg/kg/min at 05/11/25 0837    phenylephrine (DAILY-SYNEPHRINE)  injection   Intravenous Continuous PRN Phillip Salas, APRTYREL CRNA   100 mcg at 05/11/25 0822    propofol (DIPRIVAN) injection 10 mg/mL vial   Intravenous PRN Phillip Salas, APRN CRNA   20 mg at 05/11/25 0813    rocuronium injection   Intravenous PRN Phillip Salas, APRN CRNA   50 mg at 05/11/25 0811    sodium chloride 0.9 % infusion   Intravenous Continuous PRN Phillip Salas, APRN CRNA   New Bag at 05/11/25 0745    sodium chloride 0.9 % infusion   Intravenous Continuous PRN Phillip Salas, APRN CRNA   New Bag at 05/11/25 0830             Review of Systems:     The 10 point Review of Systems is negative other than noted in the HPI            Physical Exam:   Vitals were reviewed  Initial vitals were reviewed  All vitals stable    Gen: resting comfortably in NAD  CV: RRR on tele  Pulm: normal work of breathing on room air  Abd: soft, non-tender, non-distended  Ext: warm and well perfused; intra-aortic balloon pump in place         Data:     Lab Results   Component Value Date    WBC 11.1 (H) 05/11/2025    HGB 9.4 (L) 05/11/2025    HCT 34.5 (L) 05/11/2025     05/11/2025     05/11/2025    POTASSIUM 3.3 (L) 05/11/2025    CHLORIDE 103 05/11/2025    CO2 23 05/11/2025    BUN 32.4 (H) 05/11/2025    CR 1.95 (H) 05/11/2025    GLC 90 05/11/2025    DD 0.4 07/11/2013    NTBNP 1,462 (H) 05/09/2025    TROPI 0.028 07/12/2013     (H) 05/10/2025    ALT 22 05/10/2025    ALKPHOS 60 05/10/2025    BILITOTAL 0.3 05/10/2025    INR 1.06 05/10/2025     All cardiac studies reviewed by me.  All imaging studies reviewed by me.    NOEMY Guzman MD  Cardiothoracic Surgery  Cell: [phone]; Pager [phone]

## 2025-05-11 NOTE — PLAN OF CARE
CV  Pressors: None   HR range: SB/SR 46-70 with PAC/PVC's.  IABP 1:1 MAP >70.      Neuro  Orientation: A&O.  Melatonin given prior to sleep, awoke confused to place but easily re orients with cues.  Delirium present?: No.  Sleep promoted.   Sleep: Yes, Between cares.  Pain: mild pain with Navarro placement, lab collection, XRAY, removal of rings and NIBP checks.    GI/  BM?: No.   Urine output: retention noted prior to Navarro placement, Pt endorses being unable to void with Female External catheter.    Lines:  Left AC PIV, Now saline locked after Heparin infusion stopped at 0330.      Family updated with plans of care at bedside.  Questions encouraged and answered as able.        Problem: Delirium  Goal: Improved Sleep  Outcome: Progressing  Intervention: Promote Sleep  Flowsheets (Taken 5/11/2025 0595)  Sleep/Rest Enhancement:   awakenings minimized   family presence promoted   consistent schedule promoted   medication   noise level reduced   room darkened   regular sleep/rest pattern promoted   relaxation techniques promoted   visualization   therapeutic touch utilized

## 2025-05-11 NOTE — PROGRESS NOTES
ICU Cross Cover Note    Patient arrived back from the OR after undergoing 2 vessel CABG. EF was 50% with moderate MR, at the beginning of the case and EF looked better after the case.   Intubation was easy   ml  Received :  1750 crystalloids   250 ml albumin  No transfusions  Had 2 mediastinal CT and one left pleural tube  Patient can be fast tracked. Surgery team would like that epi be left at 0.02 overnight but can wean off Norepinephrine   IVF as needed to keep CVP between 8-12    Nathan Parkinson MD

## 2025-05-12 ENCOUNTER — RESULTS FOLLOW-UP (OUTPATIENT)
Dept: CARDIOLOGY | Facility: CLINIC | Age: 85
End: 2025-05-12

## 2025-05-12 ENCOUNTER — APPOINTMENT (OUTPATIENT)
Dept: GENERAL RADIOLOGY | Facility: CLINIC | Age: 85
DRG: 233 | End: 2025-05-12
Attending: SURGERY
Payer: COMMERCIAL

## 2025-05-12 DIAGNOSIS — Z95.1 S/P CABG (CORONARY ARTERY BYPASS GRAFT): Primary | ICD-10-CM

## 2025-05-12 LAB
ALLEN'S TEST: ABNORMAL
ALLEN'S TEST: ABNORMAL
ANION GAP SERPL CALCULATED.3IONS-SCNC: 11 MMOL/L (ref 7–15)
ANION GAP SERPL CALCULATED.3IONS-SCNC: 14 MMOL/L (ref 7–15)
ATRIAL RATE - MUSE: 116 BPM
ATRIAL RATE - MUSE: 120 BPM
ATRIAL RATE - MUSE: 88 BPM
ATRIAL RATE - MUSE: 89 BPM
BASE EXCESS BLDA CALC-SCNC: 0.3 MMOL/L (ref -3–3)
BASE EXCESS BLDA CALC-SCNC: 0.5 MMOL/L (ref -3–3)
BASE EXCESS BLDV CALC-SCNC: 0.9 MMOL/L (ref -3–3)
BASE EXCESS BLDV CALC-SCNC: 0.9 MMOL/L (ref -3–3)
BUN SERPL-MCNC: 29.4 MG/DL (ref 8–23)
BUN SERPL-MCNC: 32.7 MG/DL (ref 8–23)
CA-I BLD-MCNC: 4.5 MG/DL (ref 4.4–5.2)
CALCIUM SERPL-MCNC: 8.5 MG/DL (ref 8.8–10.4)
CALCIUM SERPL-MCNC: 8.6 MG/DL (ref 8.8–10.4)
CHLORIDE SERPL-SCNC: 108 MMOL/L (ref 98–107)
CHLORIDE SERPL-SCNC: 108 MMOL/L (ref 98–107)
CREAT SERPL-MCNC: 2 MG/DL (ref 0.51–0.95)
CREAT SERPL-MCNC: 2.08 MG/DL (ref 0.51–0.95)
DIASTOLIC BLOOD PRESSURE - MUSE: NORMAL MMHG
EGFRCR SERPLBLD CKD-EPI 2021: 23 ML/MIN/1.73M2
EGFRCR SERPLBLD CKD-EPI 2021: 24 ML/MIN/1.73M2
ERYTHROCYTE [DISTWIDTH] IN BLOOD BY AUTOMATED COUNT: 13.6 % (ref 10–15)
GLUCOSE BLDC GLUCOMTR-MCNC: 111 MG/DL (ref 70–99)
GLUCOSE BLDC GLUCOMTR-MCNC: 112 MG/DL (ref 70–99)
GLUCOSE BLDC GLUCOMTR-MCNC: 114 MG/DL (ref 70–99)
GLUCOSE BLDC GLUCOMTR-MCNC: 115 MG/DL (ref 70–99)
GLUCOSE BLDC GLUCOMTR-MCNC: 121 MG/DL (ref 70–99)
GLUCOSE BLDC GLUCOMTR-MCNC: 123 MG/DL (ref 70–99)
GLUCOSE BLDC GLUCOMTR-MCNC: 135 MG/DL (ref 70–99)
GLUCOSE BLDC GLUCOMTR-MCNC: 136 MG/DL (ref 70–99)
GLUCOSE BLDC GLUCOMTR-MCNC: 138 MG/DL (ref 70–99)
GLUCOSE BLDC GLUCOMTR-MCNC: 165 MG/DL (ref 70–99)
GLUCOSE BLDC GLUCOMTR-MCNC: 82 MG/DL (ref 70–99)
GLUCOSE BLDC GLUCOMTR-MCNC: 97 MG/DL (ref 70–99)
GLUCOSE SERPL-MCNC: 119 MG/DL (ref 70–99)
GLUCOSE SERPL-MCNC: 134 MG/DL (ref 70–99)
HCO3 BLD-SCNC: 24 MMOL/L (ref 21–28)
HCO3 BLD-SCNC: 25 MMOL/L (ref 21–28)
HCO3 BLDV-SCNC: 25 MMOL/L (ref 21–28)
HCO3 BLDV-SCNC: 26 MMOL/L (ref 21–28)
HCO3 SERPL-SCNC: 22 MMOL/L (ref 22–29)
HCO3 SERPL-SCNC: 24 MMOL/L (ref 22–29)
HCT VFR BLD AUTO: 26.2 % (ref 35–47)
HGB BLD-MCNC: 8.9 G/DL (ref 11.7–15.7)
INTERPRETATION ECG - MUSE: NORMAL
LACTATE SERPL-SCNC: 1.4 MMOL/L (ref 0.7–2)
LACTATE SERPL-SCNC: 1.9 MMOL/L (ref 0.7–2)
LACTATE SERPL-SCNC: 2.5 MMOL/L (ref 0.7–2)
MAGNESIUM SERPL-MCNC: 2.4 MG/DL (ref 1.7–2.3)
MCH RBC QN AUTO: 30.8 PG (ref 26.5–33)
MCHC RBC AUTO-ENTMCNC: 34 G/DL (ref 31.5–36.5)
MCV RBC AUTO: 91 FL (ref 78–100)
O2/TOTAL GAS SETTING VFR VENT: 28 %
O2/TOTAL GAS SETTING VFR VENT: 28 %
O2/TOTAL GAS SETTING VFR VENT: 30 %
O2/TOTAL GAS SETTING VFR VENT: 30 %
OXYHGB MFR BLDA: 95 % (ref 92–100)
OXYHGB MFR BLDA: 97 % (ref 92–100)
OXYHGB MFR BLDV: 69 % (ref 70–75)
OXYHGB MFR BLDV: 71 % (ref 70–75)
P AXIS - MUSE: 45 DEGREES
P AXIS - MUSE: 50 DEGREES
P AXIS - MUSE: 61 DEGREES
P AXIS - MUSE: 82 DEGREES
PCO2 BLD: 33 MM HG (ref 35–45)
PCO2 BLD: 39 MM HG (ref 35–45)
PCO2 BLDV: 37 MM HG (ref 40–50)
PCO2 BLDV: 42 MM HG (ref 40–50)
PEEP: 5 CM H2O
PH BLD: 7.42 [PH] (ref 7.35–7.45)
PH BLD: 7.47 [PH] (ref 7.35–7.45)
PH BLDV: 7.4 [PH] (ref 7.32–7.43)
PH BLDV: 7.44 [PH] (ref 7.32–7.43)
PHOSPHATE SERPL-MCNC: 3.5 MG/DL (ref 2.5–4.5)
PLATELET # BLD AUTO: 125 10E3/UL (ref 150–450)
PO2 BLD: 105 MM HG (ref 80–105)
PO2 BLD: 80 MM HG (ref 80–105)
PO2 BLDV: 35 MM HG (ref 25–47)
PO2 BLDV: 38 MM HG (ref 25–47)
POTASSIUM SERPL-SCNC: 3.5 MMOL/L (ref 3.4–5.3)
POTASSIUM SERPL-SCNC: 3.9 MMOL/L (ref 3.4–5.3)
PR INTERVAL - MUSE: 146 MS
PR INTERVAL - MUSE: 152 MS
PR INTERVAL - MUSE: 154 MS
PR INTERVAL - MUSE: 156 MS
QRS DURATION - MUSE: 100 MS
QRS DURATION - MUSE: 100 MS
QRS DURATION - MUSE: 96 MS
QRS DURATION - MUSE: 98 MS
QT - MUSE: 324 MS
QT - MUSE: 338 MS
QT - MUSE: 394 MS
QT - MUSE: 396 MS
QTC - MUSE: 450 MS
QTC - MUSE: 476 MS
QTC - MUSE: 477 MS
QTC - MUSE: 481 MS
R AXIS - MUSE: 20 DEGREES
R AXIS - MUSE: 21 DEGREES
R AXIS - MUSE: 51 DEGREES
R AXIS - MUSE: 8 DEGREES
RBC # BLD AUTO: 2.89 10E6/UL (ref 3.8–5.2)
SAO2 % BLDA: 96.3 % (ref 95–96)
SAO2 % BLDA: 97.8 % (ref 95–96)
SAO2 % BLDV: 69.9 % (ref 70–75)
SAO2 % BLDV: 72.7 % (ref 70–75)
SODIUM SERPL-SCNC: 143 MMOL/L (ref 135–145)
SODIUM SERPL-SCNC: 144 MMOL/L (ref 135–145)
SYSTOLIC BLOOD PRESSURE - MUSE: NORMAL MMHG
T AXIS - MUSE: 110 DEGREES
T AXIS - MUSE: 129 DEGREES
T AXIS - MUSE: 152 DEGREES
T AXIS - MUSE: 155 DEGREES
VENTRICULAR RATE- MUSE: 116 BPM
VENTRICULAR RATE- MUSE: 120 BPM
VENTRICULAR RATE- MUSE: 88 BPM
VENTRICULAR RATE- MUSE: 89 BPM
WBC # BLD AUTO: 20.8 10E3/UL (ref 4–11)

## 2025-05-12 PROCEDURE — 82310 ASSAY OF CALCIUM: CPT | Performed by: NURSE PRACTITIONER

## 2025-05-12 PROCEDURE — 250N000013 HC RX MED GY IP 250 OP 250 PS 637: Performed by: SURGERY

## 2025-05-12 PROCEDURE — 82805 BLOOD GASES W/O2 SATURATION: CPT | Performed by: INTERNAL MEDICINE

## 2025-05-12 PROCEDURE — 99291 CRITICAL CARE FIRST HOUR: CPT | Mod: 25 | Performed by: INTERNAL MEDICINE

## 2025-05-12 PROCEDURE — 250N000013 HC RX MED GY IP 250 OP 250 PS 637: Performed by: INTERNAL MEDICINE

## 2025-05-12 PROCEDURE — 250N000013 HC RX MED GY IP 250 OP 250 PS 637: Performed by: NURSE PRACTITIONER

## 2025-05-12 PROCEDURE — 83605 ASSAY OF LACTIC ACID: CPT | Performed by: SURGERY

## 2025-05-12 PROCEDURE — 999N000259 HC STATISTIC EXTUBATION

## 2025-05-12 PROCEDURE — 82805 BLOOD GASES W/O2 SATURATION: CPT | Performed by: NURSE PRACTITIONER

## 2025-05-12 PROCEDURE — 999N000253 HC STATISTIC WEANING TRIALS

## 2025-05-12 PROCEDURE — 36620 INSERTION CATHETER ARTERY: CPT | Performed by: INTERNAL MEDICINE

## 2025-05-12 PROCEDURE — 83735 ASSAY OF MAGNESIUM: CPT | Performed by: STUDENT IN AN ORGANIZED HEALTH CARE EDUCATION/TRAINING PROGRAM

## 2025-05-12 PROCEDURE — 93005 ELECTROCARDIOGRAM TRACING: CPT

## 2025-05-12 PROCEDURE — 999N000009 HC STATISTIC AIRWAY CARE

## 2025-05-12 PROCEDURE — 999N000157 HC STATISTIC RCP TIME EA 10 MIN

## 2025-05-12 PROCEDURE — 80048 BASIC METABOLIC PNL TOTAL CA: CPT | Performed by: STUDENT IN AN ORGANIZED HEALTH CARE EDUCATION/TRAINING PROGRAM

## 2025-05-12 PROCEDURE — 85027 COMPLETE CBC AUTOMATED: CPT | Performed by: STUDENT IN AN ORGANIZED HEALTH CARE EDUCATION/TRAINING PROGRAM

## 2025-05-12 PROCEDURE — 84100 ASSAY OF PHOSPHORUS: CPT | Performed by: SURGERY

## 2025-05-12 PROCEDURE — 250N000011 HC RX IP 250 OP 636: Performed by: SURGERY

## 2025-05-12 PROCEDURE — 71045 X-RAY EXAM CHEST 1 VIEW: CPT

## 2025-05-12 PROCEDURE — 83605 ASSAY OF LACTIC ACID: CPT | Performed by: NURSE PRACTITIONER

## 2025-05-12 PROCEDURE — 82330 ASSAY OF CALCIUM: CPT | Performed by: SURGERY

## 2025-05-12 PROCEDURE — 94003 VENT MGMT INPAT SUBQ DAY: CPT

## 2025-05-12 PROCEDURE — 250N000009 HC RX 250: Performed by: SURGERY

## 2025-05-12 PROCEDURE — 200N000001 HC R&B ICU

## 2025-05-12 PROCEDURE — 258N000003 HC RX IP 258 OP 636: Performed by: STUDENT IN AN ORGANIZED HEALTH CARE EDUCATION/TRAINING PROGRAM

## 2025-05-12 PROCEDURE — 82805 BLOOD GASES W/O2 SATURATION: CPT | Performed by: SURGERY

## 2025-05-12 RX ORDER — ROSUVASTATIN CALCIUM 5 MG/1
5 TABLET, COATED ORAL DAILY
Status: DISCONTINUED | OUTPATIENT
Start: 2025-05-12 | End: 2025-05-19 | Stop reason: HOSPADM

## 2025-05-12 RX ORDER — POTASSIUM CHLORIDE 20MEQ/15ML
10 LIQUID (ML) ORAL ONCE
Status: COMPLETED | OUTPATIENT
Start: 2025-05-12 | End: 2025-05-12

## 2025-05-12 RX ADMIN — OXYCODONE HYDROCHLORIDE 2.5 MG: 5 TABLET ORAL at 08:20

## 2025-05-12 RX ADMIN — CEFAZOLIN 1 G: 1 INJECTION, POWDER, FOR SOLUTION INTRAMUSCULAR; INTRAVENOUS at 04:03

## 2025-05-12 RX ADMIN — OXYCODONE HYDROCHLORIDE 2.5 MG: 5 TABLET ORAL at 17:28

## 2025-05-12 RX ADMIN — OXYCODONE HYDROCHLORIDE 2.5 MG: 5 TABLET ORAL at 23:23

## 2025-05-12 RX ADMIN — POLYETHYLENE GLYCOL 3350 17 G: 17 POWDER, FOR SOLUTION ORAL at 08:20

## 2025-05-12 RX ADMIN — OXYCODONE 5 MG: 5 TABLET ORAL at 03:50

## 2025-05-12 RX ADMIN — SENNOSIDES AND DOCUSATE SODIUM 1 TABLET: 50; 8.6 TABLET ORAL at 20:01

## 2025-05-12 RX ADMIN — HEPARIN SODIUM 5000 UNITS: 5000 INJECTION, SOLUTION INTRAVENOUS; SUBCUTANEOUS at 13:39

## 2025-05-12 RX ADMIN — HYDROMORPHONE HYDROCHLORIDE 0.2 MG: 0.2 INJECTION, SOLUTION INTRAMUSCULAR; INTRAVENOUS; SUBCUTANEOUS at 20:57

## 2025-05-12 RX ADMIN — ACETAMINOPHEN 975 MG: 325 TABLET, FILM COATED ORAL at 06:10

## 2025-05-12 RX ADMIN — Medication 40 MG: at 08:21

## 2025-05-12 RX ADMIN — SODIUM CHLORIDE, SODIUM LACTATE, POTASSIUM CHLORIDE, AND CALCIUM CHLORIDE: .6; .31; .03; .02 INJECTION, SOLUTION INTRAVENOUS at 20:19

## 2025-05-12 RX ADMIN — ASPIRIN 81 MG CHEWABLE TABLET 162 MG: 81 TABLET CHEWABLE at 08:20

## 2025-05-12 RX ADMIN — OXYCODONE HYDROCHLORIDE 2.5 MG: 5 TABLET ORAL at 12:18

## 2025-05-12 RX ADMIN — POTASSIUM CHLORIDE 10 MEQ: 20 SOLUTION ORAL at 06:48

## 2025-05-12 RX ADMIN — CEFAZOLIN 1 G: 1 INJECTION, POWDER, FOR SOLUTION INTRAMUSCULAR; INTRAVENOUS at 11:08

## 2025-05-12 RX ADMIN — ROSUVASTATIN CALCIUM 5 MG: 5 TABLET, FILM COATED ORAL at 09:37

## 2025-05-12 RX ADMIN — SENNOSIDES AND DOCUSATE SODIUM 1 TABLET: 50; 8.6 TABLET ORAL at 08:20

## 2025-05-12 RX ADMIN — ACETAMINOPHEN 975 MG: 325 TABLET, FILM COATED ORAL at 22:07

## 2025-05-12 RX ADMIN — HYDROMORPHONE HYDROCHLORIDE 0.1 MG: 0.2 INJECTION, SOLUTION INTRAMUSCULAR; INTRAVENOUS; SUBCUTANEOUS at 13:56

## 2025-05-12 RX ADMIN — HEPARIN SODIUM 5000 UNITS: 5000 INJECTION, SOLUTION INTRAVENOUS; SUBCUTANEOUS at 22:12

## 2025-05-12 RX ADMIN — NOREPINEPHRINE BITARTRATE 0.05 MCG/KG/MIN: 0.02 INJECTION, SOLUTION INTRAVENOUS at 17:26

## 2025-05-12 RX ADMIN — ACETAMINOPHEN 975 MG: 325 TABLET, FILM COATED ORAL at 13:39

## 2025-05-12 ASSESSMENT — ACTIVITIES OF DAILY LIVING (ADL)
ADLS_ACUITY_SCORE: 41
ADLS_ACUITY_SCORE: 40
ADLS_ACUITY_SCORE: 45
ADLS_ACUITY_SCORE: 41
ADLS_ACUITY_SCORE: 40
ADLS_ACUITY_SCORE: 40
ADLS_ACUITY_SCORE: 41
DEPENDENT_IADLS:: INDEPENDENT
ADLS_ACUITY_SCORE: 40
ADLS_ACUITY_SCORE: 41
ADLS_ACUITY_SCORE: 41
ADLS_ACUITY_SCORE: 40
ADLS_ACUITY_SCORE: 41
ADLS_ACUITY_SCORE: 40
ADLS_ACUITY_SCORE: 40
ADLS_ACUITY_SCORE: 45
ADLS_ACUITY_SCORE: 41

## 2025-05-12 NOTE — PROGRESS NOTES
[facility]  Cardiovascular and Thoracic Surgery Daily Note      Assessment and Plan  Mikayla Sotomayor is a 84 year old female with a PMH of HTN, CKD stage 3, HLD and DM2. Presented to [facility] as NSTEMI and transferred to [facility] beds at the [facility] with IABP given critical LM stenosis and need for urgent CABG.    POD # 1 s/p coronary artery bypass grafting x2 (LIMA to LAD and SVG to OM) on 5/11/25 with Dr. Roberto Guzman. IABP removed intraoperatively.    - CVS:   Pre-op TTE with LVEF 35%, grade I diastolic dysfunction and preserved RV function. 1-2+ MR, 1-2+ TR  Carotid US demonstrates moderate bilateral carotid stenosis  HD stable overnight in NSR. Currently on Epi and coming down on Vaso drips to maintain hemodynamic parameters. CVP 9-13, PAP 39-15, CI 3.2 with SVR ~1000 via EMERSON. Lactate flat    Postop vasoplegic shock, improving  Continue to support hemodynamics with pressors as needed  Leave epi on until extubation, titrate norepi to maintain MAP 60-65  Support with fluid PRN   Will repeat lactate, EMERSON and BMP at noon today  Continue  mg daily, BB deferred given pressor requirements, start rosuvastatin 5 mg daily and up titrate as able (new med for patient)  Optimize GDMT as able. CORE consult placed. Will need baseline echo prior to discharge  Chest tubes: in place to suction with ssang output, leave in. TPW: cap   PTA meds on hold: vasotec, hydralazine, hydrodiuril     - Resp: Postoperative mechanical ventilation. Remains intubated. PST and extubate as able today and then encouarge IS, pulmonary toilet. Current vent settings:  FiO2 (%): 30 %, Resp: 22, Vent Mode: VC/AC, Resp Rate (Set): 14 breaths/min, Tidal Volume (Set, mL): 400 mL, PEEP (cm H2O): 5 cmH2O, Resp Rate (Set): 14 breaths/min, Tidal Volume (Set, mL): 400 mL, PEEP (cm H2O): 5 cmH2O     - Neuro: Neuros intact, pain controlled on current regimen. On precedex for sedation while intubated.    - Renal: ZANE on CKD stage 3. BL Cr appears to be ~1.6. Trend  BMP. Volume management as above.  Recent Labs   Lab 05/12/25  0532 05/11/25  2135 05/11/25  1318   CR 2.00* 1.83* 1.61*       - GI: no BM, no flatus, continue bowel regimen    - : Leave Navarro in place for strict I/O    - Endo: Postop stress hyperglycemia. DM2.  Insulin infusion to continue for full 48h   Hemoglobin A1C   Date Value Ref Range Status   05/10/2025 6.0 (H) <5.7 % Final     Comment:     Normal <5.7%   Prediabetes 5.7-6.4%    Diabetes 6.5% or higher     Note: Adopted from ADA consensus guidelines.   01/07/2019 6.9 (H) 0 - 5.6 % Final     Comment:     Normal <5.7% Prediabetes 5.7-6.4%  Diabetes 6.5% or higher - adopted from ADA   consensus guidelines.          - FEN: Replace electrolytes as needed. ADAT once extubated.     - ID: Postop leukocytosis. Tmax 101.3. Periop abx prophylaxis completing. Trend CBC and fever curve.   Recent Labs   Lab 05/12/25  0532 05/11/25  1318 05/11/25  1138   WBC 20.8* 27.2* 22.3*       - Heme: Acute blood loss anemia and thrombocytopenia due to surgery. Hgb and PLT stable. Trend CBC, transfuse PRN.   Recent Labs   Lab 05/12/25  0532 05/11/25  1318 05/11/25  1150 05/11/25  1139 05/11/25  1138   HGB 8.9* 10.1* 7.1*   < > 8.1*   * 156  --   --  121*    < > = values in this interval not displayed.       - Proph: SCD, subcutaneous heparin, PPI    - Other:  Clinically Significant Risk Factors        # Hypokalemia: Lowest K = 3.3 mmol/L in last 2 days, will replace as needed   # Hyperchloremia: Highest Cl = 108 mmol/L in last 2 days, will monitor as appropriate      # Hypocalcemia: Lowest Ca = 8.6 mg/dL in last 2 days, will monitor and replace as appropriate  # Hypercalcemia: Highest iCa = 5.3 mg/dL in last 2 days, will monitor as appropriate   # Anion Gap Metabolic Acidosis: Highest Anion Gap = 20 mmol/L in last 2 days, will monitor and treat as appropriate  # Hypoalbuminemia: Lowest albumin = 3.3 g/dL at 5/11/2025  9:35 PM, will monitor as appropriate  # Coagulation  Defect: INR = 1.35 (Ref range: 0.85 - 1.15) and/or PTT = 33 Seconds (Ref range: 22 - 38 Seconds), will monitor for bleeding  # Thrombocytopenia: Lowest platelets = 121 in last 2 days, will monitor for bleeding   # Hypertension: Noted on problem list  # Chronic heart failure with reduced ejection fraction: last echo with EF <40%    # Acute Hypoxic Respiratory Failure: Based on blood gas results. Continue supplemental oxygen as needed                    - Dispo: Continue in ICU. Progress as able pending extubation.      Interval History  NAEON, arterial line needed replacing. Follows commands, on dex drip. Remains on hemodynamic support with epi/norepi.     Medications  Current Facility-Administered Medications   Medication Dose Route Frequency Provider Last Rate Last Admin    acetaminophen (TYLENOL) tablet 975 mg  975 mg Oral Q8H Rodriguez Huitron MD   975 mg at 05/12/25 0610    aspirin (ASA) chewable tablet 162 mg  162 mg Oral or NG Tube Daily Rodriguez Huitron MD   162 mg at 05/12/25 0820    ceFAZolin (ANCEF) 1 g vial to attach to  ml bag for ADULT or 50 ml bag for PEDS  1 g Intravenous Q8H Rodriguez Huitron MD   1 g at 05/12/25 0403    heparin ANTICOAGULANT injection 5,000 Units  5,000 Units Subcutaneous Q8H Rodriguez Huitron MD        pantoprazole (PROTONIX) 2 mg/mL suspension 40 mg  40 mg Oral or NG Tube Daily Rodriguez Huitron MD   40 mg at 05/12/25 0821    Or    pantoprazole (PROTONIX) EC tablet 40 mg  40 mg Oral Daily Rodriguez Huitron MD        polyethylene glycol (MIRALAX) Packet 17 g  17 g Oral Daily Rodriguez Huitron MD   17 g at 05/12/25 0820    senna-docusate (SENOKOT-S/PERICOLACE) 8.6-50 MG per tablet 1 tablet  1 tablet Oral BID Rodriguez Huitron MD   1 tablet at 05/12/25 0820    sodium chloride (PF) 0.9% PF flush 3 mL  3 mL Intracatheter Q8H Rodriguez Huitron MD         Current Facility-Administered Medications   Medication Dose Route Frequency Provider Last Rate Last  Admin    acetaminophen (TYLENOL) tablet 650 mg  650 mg Oral Q4H PRN Constantin Ovalle MD   650 mg at 05/11/25 1357    [START ON 5/14/2025] bisacodyl (DULCOLAX) suppository 10 mg  10 mg Rectal Daily PRN Rodriguez Huitron MD        calcium gluconate 1 g in 50 mL in sodium chloride intermittent infusion  1 g Intravenous Once PRN Rodriguez Huitron MD        calcium gluconate 2 g in  mL intermittent infusion  2 g Intravenous Once PRN Rodriguez Huitron MD        calcium gluconate 3 g in sodium chloride 0.9 % 100 mL intermittent infusion  3 g Intravenous Once PRN Rodriguez Huitron MD        glucose gel 15-30 g  15-30 g Oral Q15 Min PRN Rodriguez Huitron MD        Or    dextrose 50 % injection 25-50 mL  25-50 mL Intravenous Q15 Min PRN Rodriguez Huitron MD        Or    glucagon injection 1 mg  1 mg Subcutaneous Q15 Min PRN Rodriguez Huitron MD        HOLD:  Metformin and metformin containing medications if patient received IV contrast with acute kidney injury or severe chronic kidney disease (stage IV or stage V; i.e., eGFR less than 30)   Does not apply HOLD Constantin Ovalle MD        hydrALAZINE (APRESOLINE) injection 10 mg  10 mg Intravenous Q30 Min PRN Rodriguez Huitron MD        HYDROmorphone (DILAUDID) injection 0.1 mg  0.1 mg Intravenous Q4H PRN Rodriguez Huitron MD        Or    HYDROmorphone (DILAUDID) injection 0.2 mg  0.2 mg Intravenous Q4H PRN Rodriguez Huitron MD        lactated ringers BOLUS 500 mL  500 mL Intravenous Once PRN Bridget Jolley NP        lidocaine (LMX4) cream   Topical Q1H PRN Rodriguez Huitron MD        lidocaine 1 % 0.1-1 mL  0.1-1 mL Other Q1H PRN Rodriguez Huitron MD        [START ON 5/13/2025] magnesium hydroxide (MILK OF MAGNESIA) suspension 30 mL  30 mL Oral Daily PRN Rodriguez Huitron MD        melatonin tablet 3 mg  3 mg Oral At Bedtime PRN Mely Saini MD   3 mg at 05/10/25 2224    naloxone (NARCAN) injection 0.2 mg  0.2 mg Intravenous Q2 Min  PRN Invasive, CardiologMD fredi        Or    naloxone (NARCAN) injection 0.4 mg  0.4 mg Intravenous Q2 Min PRN Invasive CardiologMD fredi        Or    naloxone (NARCAN) injection 0.2 mg  0.2 mg Intramuscular Q2 Min PRN Invasive, CardioMD sheryl        Or    naloxone (NARCAN) injection 0.4 mg  0.4 mg Intramuscular Q2 Min PRN Pierre CardioMD sheryl        niCARdipine 40 mg in 200 mL NS (CARDENE) infusion  0.5-15 mg/hr Intravenous Continuous PRN Rodriguez Huitron MD        norepinephrine (LEVOPHED) 4 mg in  mL infusion PREMIX  0.01-0.15 mcg/kg/min Intravenous Continuous PRN Rodriguez Huitron MD   Stopped at 05/12/25 0143    ondansetron (ZOFRAN ODT) ODT tab 4 mg  4 mg Oral Q6H PRN Rodriguez Huitron MD        Or    ondansetron (ZOFRAN) injection 4 mg  4 mg Intravenous Q6H PRN Rodriguez Huitron MD        oxyCODONE IR (ROXICODONE) half-tab 2.5 mg  2.5 mg Oral Q4H PRN Rodriguez Huitron MD   2.5 mg at 05/12/25 0820    Or    oxyCODONE (ROXICODONE) tablet 5 mg  5 mg Oral Q4H PRN Rodriguez Huitron MD   5 mg at 05/12/25 0350    prochlorperazine (COMPAZINE) injection 5 mg  5 mg Intravenous Q6H PRN Rodriguez Huitron MD        Or    prochlorperazine (COMPAZINE) tablet 5 mg  5 mg Oral Q6H PRN Rodriguez Huitron MD        Reason beta blocker order not selected   Does not apply DOES NOT GO TO Rodriguez Daly MD        sodium chloride (PF) 0.9% PF flush 3 mL  3 mL Intracatheter q1 min prn Rodriguez Huitron MD        vasopressin 0.2 units/mL in NS (PITRESSIN) standard conc infusion  0.5-4 Units/hr Intravenous Continuous PRN Rodriguez Huitron MD   Stopped at 05/12/25 0737         Physical Exam  Vitals were reviewed  Blood pressure 113/50, pulse 70, temperature 100  F (37.8  C), resp. rate 22, weight 53.4 kg (117 lb 11.6 oz), SpO2 98%.  Rhythm: NSR    Lungs: diminished throughout on ventilator    Cardiovascular: Distant, S1, S2, RRR, no m/r/g    Abdomen: soft, NT, ND, rare BS    Extremeties:  warm, no LE edema    Incision: CDI    CT: serosang output, no air leak    Weight:   Vitals:    05/11/25 0600 05/12/25 0515   Weight: 57.4 kg (126 lb 8.7 oz) 53.4 kg (117 lb 11.6 oz)         Data  Recent Labs   Lab 05/12/25  0803 05/12/25  0536 05/12/25  0532 05/11/25  2139 05/11/25  2135 05/11/25  1334 05/11/25  1318 05/11/25  1150 05/11/25  1139 05/11/25  1138 05/10/25  1853 05/10/25  1739 05/10/25  0804 05/10/25  0742   WBC  --   --  20.8*  --   --   --  27.2*  --   --  22.3*   < >  --   --  12.4*   HGB  --   --  8.9*  --   --   --  10.1* 7.1*   < > 8.1*   < >  --   --  12.9   MCV  --   --  91  --   --   --  94  --   --  94   < >  --   --  92   PLT  --   --  125*  --   --   --  156  --   --  121*   < >  --   --  240   INR  --   --   --   --   --   --  1.35*  --   --  1.47*  --  1.06  --   --    NA  --   --  144  --  144  --  138 141   < > 140   < > 140  --  138   POTASSIUM  --   --  3.5  --  3.6  --  4.2 3.4   < > 4.4   < > 4.4  --  4.0   CHLORIDE  --   --  108*  --  108*  --  103  --   --  106   < > 102  --  101   CO2  --   --  22  --  25  --  15*  --   --  20*   < > 22  --  25   BUN  --   --  29.4*  --  27.9*  --  27.1*  --   --  28.1*   < > 28.6*  --  31.0*   CR  --   --  2.00*  --  1.83*  --  1.61*  --   --  1.67*   < > 1.66*  --  1.68*   ANIONGAP  --   --  14  --  11  --  20*  --   --  14   < > 16*  --  12   LISA  --   --  8.6*  --  9.0  --  9.0  --   --  9.0   < > 9.7  --  9.6   * 138* 134*   < > 142*   < > 215* 126*   < > 152*   < > 98   < > 136*   ALBUMIN  --   --   --   --  3.3*  --  3.4*  --   --   --   --   --   --  3.9   PROTTOTAL  --   --   --   --  5.3*  --  5.4*  --   --   --   --   --   --  6.8   BILITOTAL  --   --   --   --  0.2  --  0.4  --   --   --   --   --   --  0.3   ALKPHOS  --   --   --   --  42  --  43  --   --   --   --   --   --  60   ALT  --   --   --   --  11  --  16  --   --   --   --   --   --  22   AST  --   --   --   --  122*  --   --   --   --   --   --   --   --  393*    <  > = values in this interval not displayed.       Imaging:  Recent Results (from the past 24 hours)   XR Chest Port 1 View    Narrative    EXAM: XR CHEST PORT 1 VIEW  LOCATION: [facility] [place]  DATE: 5/11/2025    INDICATION: Post Op CVTS Surgery  COMPARISON: 5/11/2025 at 3:27 AM       Impression    IMPRESSION: Endotracheal tube tip 3 cm above the raimundo. NG tube tip and sidehole in the stomach. Mediastinal drains and left chest tube. Right IJ Doniphan-Rg catheter tip in the main pulmonary artery. Sternotomy and mediastinal clips. Epicardial pacer   wire. Findings new from earlier today. Intra-aortic balloon pump has been removed. Calcified aortic atherosclerosis. Atelectasis left lung base. Mild degenerative change in the spine.    XR Chest Port 1 View    Narrative    EXAM: XR CHEST PORT 1 VIEW  LOCATION: [facility] [place]  DATE: 5/12/2025    INDICATION: Post Op CVTS Surgery  COMPARISON:5I/11/2025.      Impression    IMPRESSION: The ET tube NG tube and mediastinal drains, left-sided chest tube as well as the right IJ Doniphan-Rg catheter are all unchanged in position. Postsurgical changes from recent cardiac surgery are again noted, unchanged from prior exam. There is   mild central pulmonary venous congestion, similar prior exam. Slightly increased left basilar atelectasis is noted. No pneumothorax.         Patient seen and discussed with Dr. Thomas Jolley, APRN, ACNPC-AG, CCRN  Nurse Practitioner  Cardiothoracic Surgery  Pager: [phone]    CV Surgery Rounding Pager: [phone]  After hours please page surgeon on-call  ]

## 2025-05-12 NOTE — PROGRESS NOTES
Extubation Note    Successful completion of SBT (Yes or No):yes  Extubation time:1032    Patient assessment:  Lung sounds:clear/diminished  Stridor Present (Yes or No): no  Patient tolerance: well    Oxygen device: nasal cannula  Liter flow:3lpm   SpO2:97-99%    Plan to initiate volume expansion therapy 4 hours post extubation.     RT will continue to follow.    Lynnette Kwon, RT  5/12/2025

## 2025-05-12 NOTE — PROGRESS NOTES
[facility] ICU RESPIRATORY NOTE  Date of Admission: 5/11/25  Date of Intubation (most recent): 5/11/25  Reason for Mechanical Ventilation: Post op heart  Number of Days on Mechanical Ventilation: 2  Met Criteria for Pressure Support Trial: No  Length of Pressure Support Trial:    Reason for Stopping Pressure Support Trial:  Reason for No Pressure Support Trial: Per MD     Significant Events Today: Pt remained intubated overnight     ABG Results:   Recent Labs   Lab 05/12/25  0240 05/12/25  0238 05/11/25  2311 05/11/25  2304 05/11/25  1953 05/11/25  1739   PH  --  7.47* 7.49*  --  7.48* 7.43   PCO2  --  33* 33*  --  31* 31*   PO2  --  80 32*  --  118* 132*   HCO3  --  24 25  --  23 21   O2PER 30 30 30 30 40  40 40  40     FiO2 (%): 30 %, Resp: 19, Vent Mode: VC/AC, Resp Rate (Set): 14 breaths/min, Tidal Volume (Set, mL): 450 mL, PEEP (cm H2O): 5 cmH2O, Resp Rate (Set): 14 breaths/min, Tidal Volume (Set, mL): 450 mL, PEEP (cm H2O): 5 cmH2O    FRANCISCA Nina, RRT

## 2025-05-12 NOTE — PLAN OF CARE
Goal Outcome Evaluation:      Plan of Care Reviewed With: patient, spouse, child    Overall Patient Progress: improvingOverall Patient Progress: improving    Outcome Evaluation: Pt extubated at 1032 to 3L NC. A&Ox3-4. Intermittent confusion/redirection required. Follows commands, ALVARADO = strength. SR, frequent ectopy. Levo and Epi used to maintain MAP >65, CI >2. Minimal drainage from CT. Room air. Weak cough. Minimal UOP- MD aware. PRN Oxy used for pain. Family members updated at bedside.    CV  Pressors (which pressors and any increase/decrease in pressor needs): Levo @ 0.05mcg/kg/min, Epi @0.02mcg/kg/min. Relatively stable pressor needs, slight decrease throughout day.   HR range: 60-70's, frequent ectopy   Chest tube output: Total of ~100mL in 8 hours ([id number])    Neuro  Orientation: Alert, intermittent confusion, frequent redirection necessary. Resting comfortably between cares.   Delirium present?(y/n): developing  Sleep: resting between cares  Pain: denies pain, PRN Oxy 2.5mg given q4h to keep pt comfortable     GI/  BM? (y/n): N  Urine output: minimal- see I/O      Lines: R internal jugular with swan, R groin a-line       Problem: Adult Inpatient Plan of Care  Goal: Optimal Comfort and Wellbeing  Intervention: Monitor Pain and Promote Comfort  Recent Flowsheet Documentation  Taken 5/12/2025 1200 by Yasmeen Storm, RN  Pain Management Interventions:   rest   repositioned   quiet environment facilitated   pillow support provided  Taken 5/12/2025 0804 by Yasmeen Storm, RN  Pain Management Interventions:   medication (see MAR)   care clustered   emotional support   pillow support provided   repositioned   rest   relaxation techniques promoted   quiet environment facilitated

## 2025-05-12 NOTE — PLAN OF CARE
CV  Pressors: . 7U/hr Vasopressin and . 04mcg/kg/min Epinephrine.  Levophed weaned off as Vaso is keeping MAP 65 and Epinephrine to keep C.I. between 2-2.2 per Dr. Guzman.    HR range: 60-70's with Frequent PAC/PVC's overnight.    Chest tube output: scant to minimal serosang.     Neuro  Orientation: awakens easily, Perrla Bilat +3.  Delirium present?: No   Sleep: AMRIT  Pain: both Scheduled and PRN's given, now with no C/O pain.    GI/  BM?: No, No Flatus.   Urine output: low urine output overnight despite 500ml LR bolus(total of 1500ml LR Bolus since arrival to ICU at 1300 5/11.      Frequent VBG/ABG/L.A.'s overnight.  L.A. now less than 2.  Vent 30% 14 400 Peep 5.        Lines:  Right  S.L. introducer with swan at 41cm.  Left AC PIV infusing.  Right Radial Art line severely positional, eventually lost when attempting to re-thread.  New Right Femoral Art line placed per Dr. Maxwell as patient has highly calcified small radial arteries and was unable to place new radial Art line.        Problem: Cardiovascular Surgery  Goal: Acceptable Pain Control  Outcome: Progressing  Intervention: Prevent or Manage Pain  Flowsheets (Taken 5/12/2025 9569)  Pain Management Interventions:   medication (see MAR)   MD notified (comment)   care clustered   emotional support   pillow support provided   repositioned   rest

## 2025-05-12 NOTE — CONSULTS
[facility]    Received CORE Clinic Consult from Bridget Jolley CNP.     Reviewed Ms. Sotomayor's chart and note they are admitted for arm/chest pain with nausea, vomiting, diarrhea in setting of NSTEMI and subsequent CABG x2 on 5/11/25. Echocardiogram shows LVEF 35% pre-CABG. This is their first admission in the past year for concerns of heart failure.    With LVEF 35%, Ms. Sotelo does meet criteria for CORE enrollment.     CORE RN team will follow hospital course and assist with HF education and arrange follow-up when discharge timing is better defined. Note that CT surgery team may repeat echo.    Future Appointments   Date Time Provider Department Center   5/13/2025 10:15 AM Soraya Hubbard, OT [facility] [place] [facility]       Please call with questions.       Jocelyne De Leon RN BSN  CORE Clinic RN Care Coordinator   [facility]  [phone]   CORE Clinic: Cardiomyopathy, Optimization, Rehabilitation, Education

## 2025-05-12 NOTE — PLAN OF CARE
CV  Pressors (which pressors and any increase/decrease in pressor needs): Norepinephrine  HR range:70's  Chest tube output:  60ml/4hrs    Neuro  Orientation: oriented to self only  forgetfut/ anxious/ fearful  Delirium present?(y/n): Y  Sleep: off and on  Pain: yes  incisional  treated with positioning and MAR    GI/  BM? (y/n): N  Urine output: Minimal  25ml/2hrs  I/O reviewed with Bridget Jolley NP      Lines:  A-line/swan/PIV            Problem: Delirium  Goal: Optimal Coping  Intervention: Optimize Psychosocial Adjustment to Delirium  Recent Flowsheet Documentation  Taken 5/12/2025 1600 by Jose Guadalupe King RN  Supportive Measures: relaxation techniques promoted  Goal: Improved Behavioral Control  Intervention: Prevent and Manage Agitation  Recent Flowsheet Documentation  Taken 5/12/2025 1600 by Jose Guadalupe King RN  Environment Familiarity/Consistency: daily routine followed  Intervention: Minimize Safety Risk  Recent Flowsheet Documentation  Taken 5/12/2025 1600 by Jose Guadalupe King RN  Enhanced Safety Measures: review medications for side effects with activity  Trust Relationship/Rapport:   care explained   reassurance provided   thoughts/feelings acknowledged   choices provided  Goal: Improved Attention and Thought Clarity  Intervention: Maximize Cognitive Function  Recent Flowsheet Documentation  Taken 5/12/2025 1600 by Jose Guadalupe King RN  Sensory Stimulation Regulation:   care clustered   quiet environment promoted  Reorientation Measures:   clock in view   reorientation provided   Goal Outcome Evaluation:

## 2025-05-12 NOTE — PROVIDER NOTIFICATION
Call received from Dr. Guzman at 2105, discussed current hemodynamics along with Pressor/Vitals/urine output/Chest tube output.  Epi was at . 01 Levo . 04 and Vaso 2.5 ith C.O. 3.5 C.I. 2, SR with Frequent PVC's.  Art line frequenty dampening requiring manipulation for hourly blood glucose checks along with Q2hr VBG/ABG/L.A.'s.  discussed latest ABG/VBG awaiting L.A. result.  Dr. Guzman felt despite not having L.A. result it should be coming down since ABG/VBG not acidotic.  Order for 500 LR now and to have intensivist assess Art line.  Plan to increase Epi to keep C.I. >2.0.  should Lactics come down ,amay move ABG/VBG checks to Q4hrs.

## 2025-05-12 NOTE — PROVIDER NOTIFICATION
Dr. Smith updated at bedside regarding communication with Dr. Guzman.  Discussed Art line and previous dampened waveforms along with frequency of PVC's, plan to keep Art line as it correlated with cuff pressure while he assessed both NIBP & Art line.  It was felt that the frequency of PVC's was contributing to art line dampening.  Per Dr. Smith ,use PA catheter port for labs/Blood draws.  Latest L.A.  now 4.7.  Plan for Dr. Smith to call Dr. Guzman.  New orders for STAT I Ca+/CMP and next L.A. at 2300.  Increase Epi to keep C.I. 2.0 - 2.2.  Titrate Levo and Vaso first for MAP and Epi last for C.I.

## 2025-05-12 NOTE — CONSULTS
Care Management Initial Consult    General Information  Assessment completed with: Family,  (Paige)  Type of CM/SW Visit: Initial Assessment    Primary Care Provider verified and updated as needed: Yes   Readmission within the last 30 days: no previous admission in last 30 days      Reason for Consult: discharge planning  Advance Care Planning:            Communication Assessment  Patient's communication style: spoken language (English or Bilingual)    Hearing Difficulty or Deaf: yes   Wear Glasses or Blind: yes    Cognitive  Cognitive/Neuro/Behavioral: .WDL except  Level of Consciousness: lethargic  Arousal Level: arouses to voice  Orientation: disoriented to, situation  Mood/Behavior: calm, cooperative  Best Language: 0 - No aphasia  Speech: hoarse    Living Environment:   People in home: spouse  Sincere  Current living Arrangements: house      Able to return to prior arrangements: no       Family/Social Support:  Care provided by: self  Provides care for: no one  Marital Status:   Support system: , Children   (Sincere)       Description of Support System: Supportive, Involved    Support Assessment: Adequate family and caregiver support    Current Resources:   Patient receiving home care services: No        Community Resources:    Equipment currently used at home: none  Supplies currently used at home:      Employment/Financial:  Employment Status: retired        Financial Concerns:     Referral to Financial Worker: No       Does the patient's insurance plan have a 3 day qualifying hospital stay waiver?  No    Lifestyle & Psychosocial Needs:  Social Drivers of Health     Food Insecurity: Low Risk  (10/31/2023)    Food Insecurity     Within the past 12 months, did you worry that your food would run out before you got money to buy more?: No     Within the past 12 months, did the food you bought just not last and you didn t have money to get more?: Patient refused   Depression: Not at risk (4/18/2025)     PHQ-2     PHQ-2 Score: 0   Housing Stability: Low Risk  (10/31/2023)    Housing Stability     Do you have housing? : Yes     Are you worried about losing your housing?: No   Tobacco Use: Low Risk  (4/18/2025)    Patient History     Smoking Tobacco Use: Never     Smokeless Tobacco Use: Never     Passive Exposure: Not on file   Financial Resource Strain: Low Risk  (10/31/2023)    Financial Resource Strain     Within the past 12 months, have you or your family members you live with been unable to get utilities (heat, electricity) when it was really needed?: No   Alcohol Use: Not on file   Transportation Needs: Low Risk  (10/31/2023)    Transportation Needs     Within the past 12 months, has lack of transportation kept you from medical appointments, getting your medicines, non-medical meetings or appointments, work, or from getting things that you need?: No   Physical Activity: Not on file   Interpersonal Safety: Low Risk  (5/10/2025)    Interpersonal Safety     Do you feel physically and emotionally safe where you currently live?: Yes     Within the past 12 months, have you been hit, slapped, kicked or otherwise physically hurt by someone?: No     Within the past 12 months, have you been humiliated or emotionally abused in other ways by your partner or ex-partner?: No   Stress: Not on file   Social Connections: Not on file   Health Literacy: Not on file       Functional Status:  Prior to admission patient needed assistance:   Dependent ADLs:: Independent  Dependent IADLs:: Independent       Mental Health Status:  Mental Health Status: No Current Concerns       Chemical Dependency Status:                Values/Beliefs:  Spiritual, Cultural Beliefs, Buddhism Practices, Values that affect care:                 Discussed  Partnership in Safe Discharge Planning  document with patient/family: Yes:    Additional Information:  Consult for discharge planning.     84 year old female with a history of hypertension, CKD stage 3,  hyperlipidemia, and type 2 diabetes mellitus who presents with NSTEMI. She went to the cath lab with Dr. Dent on 5/10/2025 and had IABP placed. A CABG is planned for 5/11/2025. The patient was admitted to the ICU for medical management.     Writer reviewed chart and recommendations at discharge. Writer met with patients spouse and daughter at bedside and introduced self and role. Writer confirmed patient's primary doctor and home address as accurate. Patient was very independent prior to admission and family hopes to get patient home as quickly as possible. Writer explained various discharge scenarios such as home care and TCU.      Next Steps: Follow for discharge planning and coordination of care     MELY White

## 2025-05-12 NOTE — PROCEDURES
[facility]    Arterial line placement    Date/Time: 5/12/2025 3:14 AM    Performed by: Amandeep Maxwell MD  Authorized by: Amandeep Maxwell MD      UNIVERSAL PROTOCOL   Site Marked: NA  Prior Images Obtained and Reviewed:  NA  Required items: Required blood products, implants, devices and special equipment available    Patient identity confirmed:  Provided demographic data, hospital-assigned identification number and arm band  Patient was reevaluated immediately before administering moderate or deep sedation or anesthesia  Confirmation Checklist:  Patient's identity using two indicators, relevant allergies and procedure was appropriate and matched the consent or emergent situation  Time out: Immediately prior to the procedure a time out was called    Universal Protocol: the Joint Commission Universal Protocol was followed    Indication:  Shock multiple ABGs respiratory failure hemodynamic monitoring  Location:  Right femoral      SEDATION  Patient Sedated: Yes    Sedation:  See MAR for details  Vital signs: Vital signs monitored during sedation      PROCEDURE DETAILS  Sven's Test Normal?: abnormal Sven's test  Needle Gauge:  18  Seldinger technique: Seldinger technique used    Number of Attempts:  2  Post-procedure:  Line sutured      PROCEDURE    Length of time physician/provider present for 1:1 monitoring during sedation: 25

## 2025-05-13 ENCOUNTER — APPOINTMENT (OUTPATIENT)
Dept: OCCUPATIONAL THERAPY | Facility: CLINIC | Age: 85
DRG: 233 | End: 2025-05-13
Attending: STUDENT IN AN ORGANIZED HEALTH CARE EDUCATION/TRAINING PROGRAM
Payer: COMMERCIAL

## 2025-05-13 ENCOUNTER — APPOINTMENT (OUTPATIENT)
Dept: SPEECH THERAPY | Facility: CLINIC | Age: 85
DRG: 233 | End: 2025-05-13
Attending: PHYSICIAN ASSISTANT
Payer: COMMERCIAL

## 2025-05-13 ENCOUNTER — APPOINTMENT (OUTPATIENT)
Dept: GENERAL RADIOLOGY | Facility: CLINIC | Age: 85
DRG: 233 | End: 2025-05-13
Attending: STUDENT IN AN ORGANIZED HEALTH CARE EDUCATION/TRAINING PROGRAM
Payer: COMMERCIAL

## 2025-05-13 LAB
ANION GAP SERPL CALCULATED.3IONS-SCNC: 12 MMOL/L (ref 7–15)
BLD PROD TYP BPU: NORMAL
BLOOD COMPONENT TYPE: NORMAL
BUN SERPL-MCNC: 37.6 MG/DL (ref 8–23)
CA-I BLD-MCNC: 4.7 MG/DL (ref 4.4–5.2)
CALCIUM SERPL-MCNC: 8.2 MG/DL (ref 8.8–10.4)
CHLORIDE SERPL-SCNC: 104 MMOL/L (ref 98–107)
CODING SYSTEM: NORMAL
CREAT SERPL-MCNC: 2.06 MG/DL (ref 0.51–0.95)
CROSSMATCH: NORMAL
EGFRCR SERPLBLD CKD-EPI 2021: 23 ML/MIN/1.73M2
ERYTHROCYTE [DISTWIDTH] IN BLOOD BY AUTOMATED COUNT: 14.4 % (ref 10–15)
GLUCOSE BLDC GLUCOMTR-MCNC: 104 MG/DL (ref 70–99)
GLUCOSE BLDC GLUCOMTR-MCNC: 112 MG/DL (ref 70–99)
GLUCOSE BLDC GLUCOMTR-MCNC: 120 MG/DL (ref 70–99)
GLUCOSE BLDC GLUCOMTR-MCNC: 128 MG/DL (ref 70–99)
GLUCOSE BLDC GLUCOMTR-MCNC: 129 MG/DL (ref 70–99)
GLUCOSE BLDC GLUCOMTR-MCNC: 137 MG/DL (ref 70–99)
GLUCOSE BLDC GLUCOMTR-MCNC: 147 MG/DL (ref 70–99)
GLUCOSE BLDC GLUCOMTR-MCNC: 80 MG/DL (ref 70–99)
GLUCOSE BLDC GLUCOMTR-MCNC: 82 MG/DL (ref 70–99)
GLUCOSE BLDC GLUCOMTR-MCNC: 83 MG/DL (ref 70–99)
GLUCOSE SERPL-MCNC: 88 MG/DL (ref 70–99)
HCO3 SERPL-SCNC: 23 MMOL/L (ref 22–29)
HCT VFR BLD AUTO: 22 % (ref 35–47)
HGB BLD-MCNC: 7.6 G/DL (ref 11.7–15.7)
ISSUE DATE AND TIME: NORMAL
MAGNESIUM SERPL-MCNC: 2.4 MG/DL (ref 1.7–2.3)
MCH RBC QN AUTO: 31.3 PG (ref 26.5–33)
MCHC RBC AUTO-ENTMCNC: 34.5 G/DL (ref 31.5–36.5)
MCV RBC AUTO: 91 FL (ref 78–100)
PHOSPHATE SERPL-MCNC: 4.7 MG/DL (ref 2.5–4.5)
PLATELET # BLD AUTO: 90 10E3/UL (ref 150–450)
POTASSIUM SERPL-SCNC: 4.1 MMOL/L (ref 3.4–5.3)
RBC # BLD AUTO: 2.43 10E6/UL (ref 3.8–5.2)
SODIUM SERPL-SCNC: 139 MMOL/L (ref 135–145)
UNIT ABO/RH: NORMAL
UNIT NUMBER: NORMAL
UNIT STATUS: NORMAL
UNIT TYPE ISBT: 6200
WBC # BLD AUTO: 19.6 10E3/UL (ref 4–11)

## 2025-05-13 PROCEDURE — 97167 OT EVAL HIGH COMPLEX 60 MIN: CPT | Mod: GO | Performed by: OCCUPATIONAL THERAPIST

## 2025-05-13 PROCEDURE — 94660 CPAP INITIATION&MGMT: CPT

## 2025-05-13 PROCEDURE — 82310 ASSAY OF CALCIUM: CPT | Performed by: STUDENT IN AN ORGANIZED HEALTH CARE EDUCATION/TRAINING PROGRAM

## 2025-05-13 PROCEDURE — 83735 ASSAY OF MAGNESIUM: CPT | Performed by: STUDENT IN AN ORGANIZED HEALTH CARE EDUCATION/TRAINING PROGRAM

## 2025-05-13 PROCEDURE — 85041 AUTOMATED RBC COUNT: CPT | Performed by: STUDENT IN AN ORGANIZED HEALTH CARE EDUCATION/TRAINING PROGRAM

## 2025-05-13 PROCEDURE — 97530 THERAPEUTIC ACTIVITIES: CPT | Mod: GO | Performed by: OCCUPATIONAL THERAPIST

## 2025-05-13 PROCEDURE — 97110 THERAPEUTIC EXERCISES: CPT | Mod: GO | Performed by: OCCUPATIONAL THERAPIST

## 2025-05-13 PROCEDURE — 250N000012 HC RX MED GY IP 250 OP 636 PS 637: Performed by: PHYSICIAN ASSISTANT

## 2025-05-13 PROCEDURE — 250N000011 HC RX IP 250 OP 636: Performed by: PHYSICIAN ASSISTANT

## 2025-05-13 PROCEDURE — 999N000157 HC STATISTIC RCP TIME EA 10 MIN

## 2025-05-13 PROCEDURE — 250N000013 HC RX MED GY IP 250 OP 250 PS 637: Performed by: SURGERY

## 2025-05-13 PROCEDURE — 82330 ASSAY OF CALCIUM: CPT | Performed by: SURGERY

## 2025-05-13 PROCEDURE — 92526 ORAL FUNCTION THERAPY: CPT | Mod: GN | Performed by: SPEECH-LANGUAGE PATHOLOGIST

## 2025-05-13 PROCEDURE — 92610 EVALUATE SWALLOWING FUNCTION: CPT | Mod: GN | Performed by: SPEECH-LANGUAGE PATHOLOGIST

## 2025-05-13 PROCEDURE — 120N000013 HC R&B IMCU

## 2025-05-13 PROCEDURE — 250N000013 HC RX MED GY IP 250 OP 250 PS 637: Performed by: NURSE PRACTITIONER

## 2025-05-13 PROCEDURE — 97535 SELF CARE MNGMENT TRAINING: CPT | Mod: GO | Performed by: OCCUPATIONAL THERAPIST

## 2025-05-13 PROCEDURE — 250N000011 HC RX IP 250 OP 636: Performed by: SURGERY

## 2025-05-13 PROCEDURE — 250N000013 HC RX MED GY IP 250 OP 250 PS 637: Performed by: PHYSICIAN ASSISTANT

## 2025-05-13 PROCEDURE — 71045 X-RAY EXAM CHEST 1 VIEW: CPT

## 2025-05-13 PROCEDURE — 84100 ASSAY OF PHOSPHORUS: CPT | Performed by: STUDENT IN AN ORGANIZED HEALTH CARE EDUCATION/TRAINING PROGRAM

## 2025-05-13 PROCEDURE — P9016 RBC LEUKOCYTES REDUCED: HCPCS | Performed by: PHYSICIAN ASSISTANT

## 2025-05-13 RX ORDER — HEPARIN SODIUM 5000 [USP'U]/.5ML
5000 INJECTION, SOLUTION INTRAVENOUS; SUBCUTANEOUS EVERY 12 HOURS
Status: DISCONTINUED | OUTPATIENT
Start: 2025-05-13 | End: 2025-05-19 | Stop reason: HOSPADM

## 2025-05-13 RX ORDER — AMOXICILLIN 250 MG
2 CAPSULE ORAL 2 TIMES DAILY
Status: DISCONTINUED | OUTPATIENT
Start: 2025-05-13 | End: 2025-05-19 | Stop reason: HOSPADM

## 2025-05-13 RX ORDER — FUROSEMIDE 10 MG/ML
20 INJECTION INTRAMUSCULAR; INTRAVENOUS ONCE
Status: COMPLETED | OUTPATIENT
Start: 2025-05-13 | End: 2025-05-13

## 2025-05-13 RX ORDER — NICOTINE POLACRILEX 4 MG
15-30 LOZENGE BUCCAL
Status: DISCONTINUED | OUTPATIENT
Start: 2025-05-13 | End: 2025-05-13

## 2025-05-13 RX ORDER — DEXTROSE MONOHYDRATE 25 G/50ML
25-50 INJECTION, SOLUTION INTRAVENOUS
Status: DISCONTINUED | OUTPATIENT
Start: 2025-05-13 | End: 2025-05-13

## 2025-05-13 RX ADMIN — PANTOPRAZOLE SODIUM 40 MG: 40 TABLET, DELAYED RELEASE ORAL at 08:50

## 2025-05-13 RX ADMIN — HEPARIN SODIUM 5000 UNITS: 5000 INJECTION, SOLUTION INTRAVENOUS; SUBCUTANEOUS at 05:50

## 2025-05-13 RX ADMIN — METOPROLOL TARTRATE 12.5 MG: 25 TABLET, FILM COATED ORAL at 20:53

## 2025-05-13 RX ADMIN — SENNOSIDES AND DOCUSATE SODIUM 2 TABLET: 50; 8.6 TABLET ORAL at 08:50

## 2025-05-13 RX ADMIN — ASPIRIN 81 MG CHEWABLE TABLET 162 MG: 81 TABLET CHEWABLE at 08:50

## 2025-05-13 RX ADMIN — METOPROLOL TARTRATE 12.5 MG: 25 TABLET, FILM COATED ORAL at 08:50

## 2025-05-13 RX ADMIN — SENNOSIDES AND DOCUSATE SODIUM 2 TABLET: 50; 8.6 TABLET ORAL at 20:53

## 2025-05-13 RX ADMIN — HYDROMORPHONE HYDROCHLORIDE 0.2 MG: 0.2 INJECTION, SOLUTION INTRAMUSCULAR; INTRAVENOUS; SUBCUTANEOUS at 06:01

## 2025-05-13 RX ADMIN — HYDROMORPHONE HYDROCHLORIDE 0.2 MG: 0.2 INJECTION, SOLUTION INTRAMUSCULAR; INTRAVENOUS; SUBCUTANEOUS at 01:59

## 2025-05-13 RX ADMIN — ACETAMINOPHEN 975 MG: 325 TABLET, FILM COATED ORAL at 14:04

## 2025-05-13 RX ADMIN — ACETAMINOPHEN 975 MG: 325 TABLET, FILM COATED ORAL at 05:46

## 2025-05-13 RX ADMIN — INSULIN ASPART 1 UNITS: 100 INJECTION, SOLUTION INTRAVENOUS; SUBCUTANEOUS at 17:34

## 2025-05-13 RX ADMIN — HEPARIN SODIUM 5000 UNITS: 5000 INJECTION, SOLUTION INTRAVENOUS; SUBCUTANEOUS at 18:15

## 2025-05-13 RX ADMIN — ROSUVASTATIN CALCIUM 5 MG: 5 TABLET, FILM COATED ORAL at 08:50

## 2025-05-13 RX ADMIN — MELATONIN TAB 3 MG 3 MG: 3 TAB at 22:23

## 2025-05-13 RX ADMIN — FUROSEMIDE 20 MG: 10 INJECTION, SOLUTION INTRAVENOUS at 11:44

## 2025-05-13 RX ADMIN — ACETAMINOPHEN 975 MG: 325 TABLET, FILM COATED ORAL at 22:22

## 2025-05-13 RX ADMIN — POLYETHYLENE GLYCOL 3350 17 G: 17 POWDER, FOR SOLUTION ORAL at 08:50

## 2025-05-13 ASSESSMENT — ACTIVITIES OF DAILY LIVING (ADL)
ADLS_ACUITY_SCORE: 51
PREVIOUS_RESPONSIBILITIES: MEAL PREP;HOUSEKEEPING;LAUNDRY;SHOPPING;MEDICATION MANAGEMENT;FINANCES
ADLS_ACUITY_SCORE: 53
ADLS_ACUITY_SCORE: 45
ADLS_ACUITY_SCORE: 51
ADLS_ACUITY_SCORE: 45
ADLS_ACUITY_SCORE: 43
ADLS_ACUITY_SCORE: 43
ADLS_ACUITY_SCORE: 51
ADLS_ACUITY_SCORE: 45
ADLS_ACUITY_SCORE: 45
ADLS_ACUITY_SCORE: 51
ADLS_ACUITY_SCORE: 51

## 2025-05-13 NOTE — PROGRESS NOTES
05/13/25 0955   Appointment Info   Signing Clinician's Name / Credentials (OT) Soraya Hubbard OTR/L   Rehab Comments (OT) Initial Evaluation   Living Environment   People in Home spouse   Current Living Arrangements house   Home Accessibility stairs to enter home   Number of Stairs, Main Entrance 2   Transportation Anticipated family or friend will provide   Living Environment Comments Pt's son present and provides pt's PLOF, pt independent with ADL/IADL's and has been gettring weaker, ambulates without AD prior to admit. They have a basement but everything is on one level.   Self-Care   Regular Exercise No   Equipment Currently Used at Home none   Fall history within last six months no   Instrumental Activities of Daily Living (IADL)   Previous Responsibilities meal prep;housekeeping;laundry;shopping;medication management;finances   IADL Comments Pt doesn't drive, pt's  does. they hire out yard work and shoveling in winter.   General Information   Onset of Illness/Injury or Date of Surgery 05/11/25   Referring Physician Rodriguez Huitron MD   Patient/Family Therapy Goal Statement (OT) per son, open to TCU as needed   Additional Occupational Profile Info/Pertinent History of Current Problem Mikayla Sotomayor is a 84 year old female with a PMH of HTN, CKD stage 3, HLD and DM2. Presented to [facility] as NSTEMI and transferred to [facility] with IABP given critical LM stenosis and need for urgent CABG.     POD # 2 s/p coronary artery bypass grafting x2 (LIMA to LAD and SVG to OM) on 5/11/25 with Dr. Roberto Guzman. IABP removed intraoperatively.   Existing Precautions/Restrictions fall;cardiac;sternal;other (see comments)  (CT's, catheter, getting RBC.)   Heart Disease Risk Factors Diabetes;High blood pressure;Lack of physical activity;Dislipidemia;Age   Cognitive Status Examination   Orientation Status person;place  (pt thought it was January, pt reoreinted, pt needed orientation to current year.)   Affect/Mental  Status (Cognitive) low arousal/lethargic;anxious;confused   Follows Commands WFL;increased processing time needed;delayed response/completion   Safety Deficit at risk behavior observed   Memory Deficit short-term memory   Executive Function Deficit insight/awareness of deficits;judgment   Cognitive Status Comments cont to monitor   Visual Perception   Visual Impairment/Limitations corrective lenses full-time   Sensory   Sensory Comments denies   Pain Assessment   Patient Currently in Pain   (no pain at rest, once moving indicates pain but did not rate.)   Range of Motion Comprehensive   Comment, General Range of Motion B UE AAROM WFL   Strength Comprehensive (MMT)   Comment, General Manual Muscle Testing (MMT) Assessment overall weakness   Bed Mobility   Scooting/Bridging Barbour (Bed Mobility) maximum assist (25% patient effort)   Supine-Sit [place]bour (Bed Mobility) maximum assist (25% patient effort);2 person assist   Transfer Skill: Bed to Chair/Chair to Bed   Bed-Chair Barbour (Transfers) maximum assist (25% patient effort);2 person assist   Sit-Stand Transfer   Sit-Stand Barbour (Transfers) maximum assist (25% patient effort);2 person assist   Upper Body Dressing Assessment/Training   Barbour Level (Upper Body Dressing) maximum assist (25% patient effort)   Comment, (Upper Body Dressing) per clinical judgement   Lower Body Dressing Assessment/Training   Barbour Level (Lower Body Dressing) dependent (less than 25% patient effort)   Comment, (Lower Body Dressing) per clinical judgement   Grooming Assessment/Training   Barbour Level (Grooming) moderate assist (50% patient effort)   Clinical Impression   Criteria for Skilled Therapeutic Interventions Met (OT) Yes, treatment indicated   OT Diagnosis impaired I with ADL's and functional mobility.   OT Problem List-Impairments impacting ADL problems related to;activity tolerance impaired;balance;cognition;strength;pain;post-surgical  precautions   Assessment of Occupational Performance 5 or more Performance Deficits   Identified Performance Deficits impaired I with dressing, toileitng, showering, med mgmt, cooking, cleaning, etc   Planned Therapy Interventions (OT) ADL retraining;cognition;transfer training;home program guidelines;progressive activity/exercise;risk factor education   Clinical Decision Making Complexity (OT) comprehensive assessment/high complexity   Risk & Benefits of therapy have been explained evaluation/treatment results reviewed;care plan/treatment goals reviewed;risks/benefits reviewed;current/potential barriers reviewed;participants voiced agreement with care plan;participants included;patient;son   OT Total Evaluation Time   OT Eval, High Complexity Minutes (20001) 10   OT Goals   Therapy Frequency (OT) 2 times/day   OT Predicted Duration/Target Date for Goal Attainment 05/19/25   OT Goals Hygiene/Grooming;Upper Body Dressing;Lower Body Dressing;Bed Mobility;Toilet Transfer/Toileting;Cognition;Cardiac Phase 1   OT: Hygiene/Grooming supervision/stand-by assist;while standing   OT: Upper Body Dressing Modified independent;within precautions   OT: Lower Body Dressing Supervision/stand-by assist;using adaptive equipment;within precautions   OT: Bed Mobility Modified independent;supine to/from sitting;rolling;within precautions   OT: Toilet Transfer/Toileting Supervision/stand-by assist;toilet transfer;cleaning and garment management;using adaptive equipment;within precautions   OT: Cognitive Patient/caregiver will verbalize understanding of cognitive assessment results/recommendations as needed for safe discharge planning   OT: Understanding of cardiac education to maximize quality of life, condition management, and health outcomes Patient;Verbalize  (post CV surgery ed)   OT: Perform aerobic activity with stable cardiovascular response continuous;10 minutes;ambulation;NuStep   OT: Functional/aerobic ambulation tolerance with  stable cardiovascular response in order to return to home and community environment Supervision/SBA;200 feet  (AD as needed)   OT: Navigation of stairs simulating home set up with stable cardiovascular response in order to return to home and community environment Supervision/SBA;5 stairs   Self-Care/Home Management   Self-Care/Home Mgmt/ADL, Compensatory, Meal Prep Minutes (14017) 8   Treatment Detail/Skilled Intervention OT: initiated ed in sternal precautions, pt needing cues throughout. pt lethargic but talking, pt confused, oreinted to self, difficulty recalling , when provided with choices, pt then able to recall. Pt oriented to place but needed cues for situation. pt reports month is January and needing orientation to date.   Therapeutic Procedures/Exercise   Therapeutic Procedure: strength, endurance, ROM, flexibillity minutes (91436) 10   Symptoms Noted During/After Treatment fatigue;shortness of breath   Treatment Detail/Skilled Intervention OT: pt seated at EOB completed B LE CVC ex and then once in chair did another set up B LE CVC ex, see below for details. pt very fatigued following mobility and exercise.   Treatment Time Includes (CR Only) Monitoring of vital signs (see vital signs flowsheet for details);See specific exercise details intervention group(s)   Therapeutic Activities   Therapeutic Activity Minutes (05888) 17   Symptoms noted during/after treatment fatigue;increased pain;shortness of breath   Treatment Detail/Skilled Intervention OT: supine to sit with HOB raised and cues for log roll and to maintain sternal prcautoins throughout, pt needing mod/max A x 2, mod/max A to scoot to EOB, cues for tech throughout. pt able to sit at EOB with initial min A but progressed to SBA sitting upright, pt starting to fatigue an dleaning to the R, but with cues and initial min A sat up straight again. during B LE ex, pt needing increased A for balance, min A due to posterior lean when kicking legs,e tc.  sit <> Stand and transfer to chair with MOD/ max A x 2 pt needing cues to stand tall as leaning on RN when standing, cues throughout for PLB to A with decreasing pain and A pt to relax. pt able to march in place with MOD/MAXA x2 and transferred to chair. pt needing max A to scoot back into chair with cues for tech. increased time needed to position pt in chair.   Calisthenics   Type Hip Flexor: kicks;March in place  (ankle pumps)   Workload initially pt seated at EOB with min A for balance due to leaning posterior when initiating B LE ex.completed approx 2 ex and 4 reps with increased effort and cues progressed to SBA with sitting balance. once pt up in chair completed edward 2 ex x 4 reps each again including ankle pumps and circling ankles.   Effort Scale 5   Symptoms Fatigue   Cardiovascular Response Normal   Cardiac Education   Education Provided Precautions;Diagnosis   Education Packet Given to Patient No   All Patient Education Handouts Reviewed with Patient and/or Family No   Cardiac Rehab Phase II Plan   Phase II Order Received Yes   Phase II Appointment Status Scheduled   Date/Time 5/23   Location [facility]   OT Discharge Planning   OT Plan OT plan: A of 2 for bed mobiity and transfer to chair, begin ambulation when able, likely need FWW. bring CV sx packet when appropriate, monitor cognition, etc   OT Discharge Recommendation (DC Rec) Transitional Care Facility   OT Rationale for DC Rec prior to admit, pt independent with ADL/IADL's and functionalmobiity no AD. pt curretnly limited due to impaired cognition/confusion with oreintation, STM, etc, impaired strength, activity tolerance, sternal prcautions and pain. Pt currently requires increased A with ADL/IALD's and functiona mobility and will require TCU to return to baseline. will cont to monitor safest discharge.   OT Brief overview of current status Goals of therapy will be to address safe mobility and make recs for d/c to next level of care. Pt and RN will  continue to follow all falls risk precautions as documented by RN staff while hospitalized.mod/max A x 2 supine to sit, sit <> stand and transfer to chair, max to dependent with ADL's. pt confused oriented to self and place, neeidng cues for , situation, and date.   OT Total Distance Amb During Session (feet) 3   Total Session Time   Timed Code Treatment Minutes 35   Total Session Time (sum of timed and untimed services) 45

## 2025-05-13 NOTE — PROGRESS NOTES
Report from Chiquis RIVERA  Patient transferred to unit.   Placed on monitor, 2 RN skin check completed.   Patient confused - Family at bedside and oriented to unit   Brittany Luu RN on 5/13/2025 at 6:07 PM

## 2025-05-13 NOTE — PLAN OF CARE
Goal Outcome Evaluation:       CV  Pressors (which pressors and any increase/decrease in pressor needs): not infusing  HR range:60-80 Sr with PACs and PVCs  Chest tube output: 170mL this shift    Neuro  Orientation: Disoriented to place/time/situation   Delirium present?(y/n): yes  Sleep: resting between cares  Pain: Denies pain. Getting scheduled tylenol    GI/  BM? (y/n): No  Urine output: Navarro in place. UOP adequate. Received lasix x 1      Lines: Lines pulled.  Pt just has PIV that is SL'd.    Activity: Up in chair for 4 hours. Up with assist of 2 and gait belt.  Worked with CR.

## 2025-05-13 NOTE — PLAN OF CARE
Goal Outcome Evaluation:      Plan of Care Reviewed With: patient    Overall Patient Progress: improving    CV  Pressors (which pressors and any increase/decrease in pressor needs): Levo off since 5/12 @1944.     HR range: SR (80-90s), frequent PVCs/PACs.    Chest tube output: ~10ml/hr-serosang output.     Neuro  Orientation: Confused, oriented to person and intermittently to place.    Delirium present?(y/n): Yes    Sleep: Minimal    Pain: Constant moaning with facial grimacing. PAINAD scale used (5-6/10). PRN medications given.     GI/  BM? (y/n): No, bowel meds given. Active bowel sounds auscultated.   Urine output: Oliguric- MD aware. 10-20ml/hr.     Other  Skin: Sternal incision with wound vac, 2x LLE graft sites C/D/I.     Activity: Dangled at 0015, unable to keep head up, delirious, and increased pain.Will need the sling to get to chair.     Hemodynamics: WDL    Labs: Unremarkable, protocols ran-recheck in AM.     Lines: Right CVC, introducer with swan (41cm), Right femoral arterial line, L PIV.       Problem: Delirium  Goal: Improved Sleep  Outcome: Not Progressing  Intervention: Promote Sleep  Recent Flowsheet Documentation  Taken 5/13/2025 0000 by Jennifer Walter RN  Sleep/Rest Enhancement:   comfort measures   consistent schedule promoted   regular sleep/rest pattern promoted   room darkened   noise level reduced  Taken 5/12/2025 2000 by Jennifer Walter RN  Sleep/Rest Enhancement:   comfort measures   consistent schedule promoted   regular sleep/rest pattern promoted     Problem: Risk for Delirium  Goal: Improved Sleep  Outcome: Not Progressing  Intervention: Promote Sleep  Recent Flowsheet Documentation  Taken 5/13/2025 0000 by Jennifer Walter RN  Sleep/Rest Enhancement:   comfort measures   consistent schedule promoted   regular sleep/rest pattern promoted   room darkened   noise level reduced  Taken 5/12/2025 2000 by Jennifer Walter RN  Sleep/Rest Enhancement:   comfort  "measures   consistent schedule promoted   regular sleep/rest pattern promoted     Problem: Cardiovascular Surgery  Goal: Effective Bowel Elimination  Outcome: Not Progressing  Intervention: Enhance Bowel Motility and Elimination  Recent Flowsheet Documentation  Taken 5/13/2025 0000 by Jennifer Walter RN  Bowel Elimination Management: (bowel meds given) other (see comments)  Taken 5/12/2025 2000 by Jennifer Walter, RN  Bowel Elimination Management: (bowel meds given) other (see comments)  Goal: Acceptable Pain Control  Outcome: Not Progressing  Intervention: Prevent or Manage Pain  Recent Flowsheet Documentation  Taken 5/12/2025 2207 by Jennifer Walter, RN  Pain Management Interventions:   medication (see MAR)   relaxation techniques promoted  Goal: Effective Urinary Elimination  Outcome: Not Progressing     Problem: Adult Inpatient Plan of Care  Goal: Plan of Care Review  Description: The Plan of Care Review/Shift note should be completed every shift.  The Outcome Evaluation is a brief statement about your assessment that the patient is improving, declining, or no change.  This information will be displayed automatically on your shiftnote.  Outcome: Progressing  Flowsheets (Taken 5/13/2025 0038)  Plan of Care Reviewed With: patient  Overall Patient Progress: improving  Goal: Patient-Specific Goal (Individualized)  Description: You can add care plan individualizations to a care plan. Examples of Individualization might be:  \"Parent requests to be called daily at 9am for status\", \"I have a hard time hearing out of my right ear\", or \"Do not touch me to wake me up as it startlesme\".  Outcome: Progressing  Goal: Absence of Hospital-Acquired Illness or Injury  Outcome: Progressing  Intervention: Identify and Manage Fall Risk  Recent Flowsheet Documentation  Taken 5/13/2025 0000 by Jennifer Walter, MIGUEL  Safety Promotion/Fall Prevention:   activity supervised   assistive device/personal items within " reach   room organization consistent   safety round/check completed   treat reversible contributory factors   treat underlying cause  Taken 5/12/2025 2000 by Jennifer Walter RN  Safety Promotion/Fall Prevention:   activity supervised   assistive device/personal items within reach   room organization consistent   safety round/check completed   treat reversible contributory factors   treat underlying cause  Intervention: Prevent Skin Injury  Recent Flowsheet Documentation  Taken 5/13/2025 0020 by Jennifer Walter RN  Body Position:   turned   left   heels elevated   upper extremity elevated  Taken 5/13/2025 0000 by Jennifer Walter RN  Body Position:   turned   left   heels elevated   upper extremity elevated  Taken 5/12/2025 2200 by Jennifer Walter RN  Body Position:   turned   right   heels elevated   upper extremity elevated  Taken 5/12/2025 2000 by Jennifer Walter RN  Body Position:   turned   left   heels elevated   upper extremity elevated  Intervention: Prevent and Manage VTE (Venous Thromboembolism) Risk  Recent Flowsheet Documentation  Taken 5/13/2025 0000 by Jennifer Walter RN  VTE Prevention/Management: SCDs on (sequential compression devices)  Taken 5/12/2025 2000 by Jennifer Walter RN  VTE Prevention/Management: SCDs on (sequential compression devices)  Goal: Optimal Comfort and Wellbeing  Outcome: Progressing  Intervention: Monitor Pain and Promote Comfort  Recent Flowsheet Documentation  Taken 5/12/2025 2207 by Jennifer Walter RN  Pain Management Interventions:   medication (see MAR)   relaxation techniques promoted  Intervention: Provide Person-Centered Care  Recent Flowsheet Documentation  Taken 5/13/2025 0000 by Jennifer Walter RN  Trust Relationship/Rapport:   care explained   reassurance provided   thoughts/feelings acknowledged   choices provided   emotional support provided  Taken 5/12/2025 2000 by Jennifer Walter RN  Trust  Relationship/Rapport:   care explained   reassurance provided   thoughts/feelings acknowledged   choices provided   emotional support provided   questions answered  Goal: Readiness for Transition of Care  Outcome: Progressing     Problem: Delirium  Goal: Optimal Coping  Outcome: Progressing  Intervention: Optimize Psychosocial Adjustment to Delirium  Recent Flowsheet Documentation  Taken 5/13/2025 0000 by Jennifer Walter RN  Supportive Measures: relaxation techniques promoted  Taken 5/12/2025 2000 by Jennifer Walter RN  Supportive Measures: relaxation techniques promoted  Family/Support System Care:   caregiver stress acknowledged   involvement promoted   support provided  Goal: Improved Behavioral Control  Outcome: Progressing  Intervention: Prevent and Manage Agitation  Recent Flowsheet Documentation  Taken 5/13/2025 0000 by Jennifer Walter RN  Environment Familiarity/Consistency: daily routine followed  Taken 5/12/2025 2000 by Jennifer Walter RN  Environment Familiarity/Consistency: daily routine followed  Intervention: Minimize Safety Risk  Recent Flowsheet Documentation  Taken 5/13/2025 0000 by Jennifer Walter RN  Enhanced Safety Measures: review medications for side effects with activity  Trust Relationship/Rapport:   care explained   reassurance provided   thoughts/feelings acknowledged   choices provided   emotional support provided  Taken 5/12/2025 2000 by Jennifer Walter RN  Enhanced Safety Measures: review medications for side effects with activity  Trust Relationship/Rapport:   care explained   reassurance provided   thoughts/feelings acknowledged   choices provided   emotional support provided   questions answered  Goal: Improved Attention and Thought Clarity  Outcome: Progressing  Intervention: Maximize Cognitive Function  Recent Flowsheet Documentation  Taken 5/13/2025 0000 by Jennifer Walter RN  Sensory Stimulation Regulation:   care clustered   quiet  environment promoted  Reorientation Measures:   calendar in view   clock in view   glasses use encouraged   reorientation provided  Taken 5/12/2025 2000 by Jennifer Walter RN  Sensory Stimulation Regulation:   care clustered   quiet environment promoted  Reorientation Measures:   calendar in view   clock in view   glasses use encouraged   reorientation provided     Problem: Risk for Delirium  Goal: Optimal Coping  Outcome: Progressing  Intervention: Optimize Psychosocial Adjustment to Delirium  Recent Flowsheet Documentation  Taken 5/13/2025 0000 by Jennifer Walter RN  Supportive Measures: relaxation techniques promoted  Taken 5/12/2025 2000 by Jennifer Walter RN  Supportive Measures: relaxation techniques promoted  Family/Support System Care:   caregiver stress acknowledged   involvement promoted   support provided  Goal: Improved Behavioral Control  Outcome: Progressing  Intervention: Prevent and Manage Agitation  Recent Flowsheet Documentation  Taken 5/13/2025 0000 by Jennifer Walter RN  Environment Familiarity/Consistency: daily routine followed  Taken 5/12/2025 2000 by Jennifer Walter RN  Environment Familiarity/Consistency: daily routine followed  Intervention: Minimize Safety Risk  Recent Flowsheet Documentation  Taken 5/13/2025 0000 by Jennifer Walter RN  Enhanced Safety Measures: review medications for side effects with activity  Trust Relationship/Rapport:   care explained   reassurance provided   thoughts/feelings acknowledged   choices provided   emotional support provided  Taken 5/12/2025 2000 by Jennifer Walter RN  Enhanced Safety Measures: review medications for side effects with activity  Trust Relationship/Rapport:   care explained   reassurance provided   thoughts/feelings acknowledged   choices provided   emotional support provided   questions answered  Goal: Improved Attention and Thought Clarity  Outcome: Progressing  Intervention: Maximize  Cognitive Function  Recent Flowsheet Documentation  Taken 5/13/2025 0000 by Jennifer Walter RN  Sensory Stimulation Regulation:   care clustered   quiet environment promoted  Reorientation Measures:   calendar in view   clock in view   glasses use encouraged   reorientation provided  Taken 5/12/2025 2000 by Jennifer Walter RN  Sensory Stimulation Regulation:   care clustered   quiet environment promoted  Reorientation Measures:   calendar in view   clock in view   glasses use encouraged   reorientation provided     Problem: Cardiovascular Surgery  Goal: Improved Activity Tolerance  Outcome: Progressing  Intervention: Optimize Tolerance for Activity  Recent Flowsheet Documentation  Taken 5/13/2025 0000 by Jennifer Walter RN  Environmental Support: calm environment promoted  Taken 5/12/2025 2000 by Jennifer Walter RN  Environmental Support: calm environment promoted  Goal: Optimal Coping with Heart Surgery  Outcome: Progressing  Intervention: Support Psychosocial Response to Surgery  Recent Flowsheet Documentation  Taken 5/13/2025 0000 by Jennifer Walter RN  Supportive Measures: relaxation techniques promoted  Taken 5/12/2025 2000 by Jennifer Walter RN  Supportive Measures: relaxation techniques promoted  Family/Support System Care:   caregiver stress acknowledged   involvement promoted   support provided  Goal: Absence of Bleeding  Outcome: Progressing  Intervention: Monitor and Manage Bleeding  Recent Flowsheet Documentation  Taken 5/13/2025 0000 by Jennifer Walter RN  Bleeding Management: dressing monitored  Taken 5/12/2025 2000 by Jennifer Walter RN  Bleeding Management: dressing monitored  Goal: Effective Cardiac Function  Outcome: Progressing  Goal: Optimal Cerebral Tissue Perfusion  Outcome: Progressing  Intervention: Protect and Optimize Cerebral Perfusion  Recent Flowsheet Documentation  Taken 5/13/2025 0020 by Jennifer Walter RN  Head of Bed  (HOB) Positioning: HOB at 30-45 degrees  Taken 5/13/2025 0000 by Jennifer Walter RN  Sensory Stimulation Regulation:   care clustered   quiet environment promoted  Cerebral Perfusion Promotion: blood pressure monitored  Glycemic Management: blood glucose monitored  Head of Bed (HOB) Positioning: HOB at 30-45 degrees  Taken 5/12/2025 2200 by Jennifer Walter RN  Head of Bed (HOB) Positioning: HOB at 30-45 degrees  Taken 5/12/2025 2000 by Jennifer Walter RN  Sensory Stimulation Regulation:   care clustered   quiet environment promoted  Cerebral Perfusion Promotion: blood pressure monitored  Glycemic Management: blood glucose monitored  Head of Bed (HOB) Positioning: HOB at 30-45 degrees  Goal: Fluid and Electrolyte Balance  Outcome: Progressing  Intervention: Monitor and Manage Fluid and Electrolyte Balance  Recent Flowsheet Documentation  Taken 5/13/2025 0000 by Jennifer Walter RN  Fluid/Electrolyte Management: fluids provided  Taken 5/12/2025 2000 by Jennifer Walter RN  Fluid/Electrolyte Management: fluids provided  Goal: Blood Glucose Level Within Targeted Range  Outcome: Progressing  Intervention: Optimize Glycemic Control  Recent Flowsheet Documentation  Taken 5/13/2025 0000 by Jennifer Walter RN  Glycemic Management: blood glucose monitored  Taken 5/12/2025 2000 by Jennifer Walter RN  Glycemic Management: blood glucose monitored  Goal: Absence of Infection Signs and Symptoms  Outcome: Progressing  Goal: Anesthesia/Sedation Recovery  Outcome: Progressing  Intervention: Optimize Anesthesia Recovery  Recent Flowsheet Documentation  Taken 5/13/2025 0000 by Jennifer Walter RN  Safety Promotion/Fall Prevention:   activity supervised   assistive device/personal items within reach   room organization consistent   safety round/check completed   treat reversible contributory factors   treat underlying cause  Reorientation Measures:   calendar in view   clock in view    glasses use encouraged   reorientation provided  Taken 5/12/2025 2000 by Jennifer Walter RN  Safety Promotion/Fall Prevention:   activity supervised   assistive device/personal items within reach   room organization consistent   safety round/check completed   treat reversible contributory factors   treat underlying cause  Administration (IS): instruction provided, initial  Reorientation Measures:   calendar in view   clock in view   glasses use encouraged   reorientation provided  Patient Tolerance (IS): poor  Goal: Nausea and Vomiting Relief  Outcome: Progressing  Goal: Effective Oxygenation and Ventilation  Outcome: Progressing  Intervention: Promote Airway Secretion Clearance  Recent Flowsheet Documentation  Taken 5/13/2025 0000 by Jennifer Walter RN  Cough And Deep Breathing: done with encouragement  Taken 5/12/2025 2000 by Jennifer Walter RN  Administration (IS): instruction provided, initial  Cough And Deep Breathing: done with encouragement  Patient Tolerance (IS): poor  Intervention: Optimize Oxygenation and Ventilation  Recent Flowsheet Documentation  Taken 5/13/2025 0000 by Jennifer Walter RN  Chest Tube Safety:   all connections secured   suction checked  Taken 5/12/2025 2000 by Jennifer Walter RN  Chest Tube Safety:   all connections secured   suction checked     Problem: Skin Injury Risk Increased  Goal: Skin Health and Integrity  Outcome: Progressing  Intervention: Plan: Nurse Driven Intervention: Moisture Management  Recent Flowsheet Documentation  Taken 5/13/2025 0000 by Jennifer Walter RN  Moisture Interventions:   Incontinence pad   Urinary collection device  Bathing/Skin Care: back care  Taken 5/12/2025 2000 by Jennifer Walter RN  Moisture Interventions:   Incontinence pad   Urinary collection device  Bathing/Skin Care: linen changed  Intervention: Plan: Nurse Driven Intervention: Friction and Shear  Recent Flowsheet Documentation  Taken 5/13/2025  0000 by Jennifer Walter RN  Friction/Shear Interventions:   HOB 30 degrees or less   Silicone foam sacral dressing   Repositioning device (TAP system, etc.)  Taken 5/12/2025 2000 by Jennifer Walter RN  Friction/Shear Interventions:   HOB 30 degrees or less   Silicone foam sacral dressing   Repositioning device (TAP system, etc.)  Intervention: Optimize Skin Protection  Recent Flowsheet Documentation  Taken 5/13/2025 0020 by Jennifer Walter RN  Activity Management: back to bed  Head of Bed (HOB) Positioning: HOB at 30-45 degrees  Taken 5/13/2025 0015 by Jennifer Walter RN  Activity Management: sitting, edge of bed  Taken 5/13/2025 0000 by Jennifer Walter RN  Activity Management: activity adjusted per tolerance  Head of Bed (HOB) Positioning: HOB at 30-45 degrees  Taken 5/12/2025 2200 by Jennifer Walter RN  Head of Bed (HOB) Positioning: HOB at 30-45 degrees  Taken 5/12/2025 2000 by Jennifer Walter RN  Activity Management: activity adjusted per tolerance  Head of Bed (HOB) Positioning: HOB at 30-45 degrees  Intervention: Promote and Optimize Oral Intake  Recent Flowsheet Documentation  Taken 5/13/2025 0000 by Jennifer Walter RN  Oral Nutrition Promotion: rest periods promoted  Taken 5/12/2025 2000 by Jennifer Walter RN  Oral Nutrition Promotion: rest periods promoted     Problem: Restraint, Nonviolent  Goal: Absence of Harm or Injury  Outcome: Met  Intervention: Implement Least Restrictive Safety Strategies  Recent Flowsheet Documentation  Taken 5/13/2025 0000 by Jennifer Walter RN  Medical Device Protection: tubing secured  Taken 5/12/2025 2000 by Jennifer Walter RN  Medical Device Protection: tubing secured  Intervention: Protect Dignity, Rights and Personal Wellbeing  Recent Flowsheet Documentation  Taken 5/13/2025 0000 by Jennifer Walter RN  Trust Relationship/Rapport:   care explained   reassurance provided   thoughts/feelings  acknowledged   choices provided   emotional support provided  Taken 5/12/2025 2000 by Jennifer Walter RN  Trust Relationship/Rapport:   care explained   reassurance provided   thoughts/feelings acknowledged   choices provided   emotional support provided   questions answered  Intervention: Protect Skin and Joint Integrity  Recent Flowsheet Documentation  Taken 5/13/2025 0020 by Jennifer Walter RN  Body Position:   turned   left   heels elevated   upper extremity elevated  Taken 5/13/2025 0000 by Jennifer Walter RN  Body Position:   turned   left   heels elevated   upper extremity elevated  Range of Motion: ROM (range of motion) performed  Taken 5/12/2025 2200 by Jennifer Walter RN  Body Position:   turned   right   heels elevated   upper extremity elevated  Taken 5/12/2025 2000 by Jennifer Walter RN  Body Position:   turned   left   heels elevated   upper extremity elevated  Range of Motion: ROM (range of motion) performed

## 2025-05-13 NOTE — PROGRESS NOTES
"  SLP Bedside Swallow Evaluation  05/13/25 1411   Appointment Info   Signing Clinician's Name / Credentials (SLP) Talita Fuller MA [facility] ([facility] SLP   General Information   Onset of Illness/Injury or Date of Surgery 05/10/25   Referring Physician Flower EDWARDS   Patient/Family Therapy Goal Statement (SLP) To eat and drink ASAP   Pertinent History of Current Problem 5/13/25 PA CV Surgery note: \"Mikayla Sotomayor is a 84 year old female with a PMH of HTN, CKD stage 3, HLD and DM2. Presented to [facility] as NSTEMI and transferred to [facility] with IABP given critical LM stenosis and need for urgent CABG.   POD # 2 s/p coronary artery bypass grafting x2 (LIMA to LAD and SVG to OM) on 5/11/25 with Dr. Roberto Guzman. IABP removed intraoperative.\"  Pt extubated 5/121 10:30am.   General Observations Pt was alert and cooperative; however, pt was moaning and distracted at times.  Pt denied any pain during the session.  RN reports pt coughed with thin liquids when taking meds earlier this am; therefore, SLP swallow evaluation requested.  Pt and family deny any pt hx of dysphagia.   Type of Evaluation   Type of Evaluation Swallow Evaluation   Oral Motor   Oral Musculature   (Min decreased L labial ROM)   Dentition (Oral Motor)   Dentition (Oral Motor) adequate dentition   Cough/Swallow/Gag Reflex (Oral Motor)   Comment, Cough/Swallow/Gag Reflex (Oral Motor) Weak cough on command   Vocal Quality/Secretion Management (Oral Motor)   Comment, Vocal Quality/Secretion Management (Oral Motor) Mild decreased strength   General Swallowing Observations   Current Diet/Method of Nutritional Intake (General Swallowing Observations, NIS) clear liquid diet   Respiratory Support room air   Past History of Dysphagia Pt/family deny dysphagia hx.   Swallowing Evaluation Clinical swallow evaluation   Clinical Swallow Evaluation   Feeding Assistance dependent   Clinical Swallow Evaluation Textures Trialed thin liquids;pureed;soft & bite-sized   Clinical Swallow Eval: " Thin Liquid Texture Trial   Mode of Presentation, Thin Liquids cup   Volume of Liquid or Food Presented sips x 3   Oral Phase of Swallow WFL   Pharyngeal Phase of Swallow repeated swallows   Diagnostic Statement no overt aspiration signs, large sips despite cues for small sips; delayed belching a few minutes after trials   Clinical Swallow Evaluation: Puree Solid Texture Trial   Mode of Presentation, Puree spoon   Volume of Puree Presented tsps x 4   Oral Phase, Puree WFL   Pharyngeal Phase, Puree repeated swallows   Diagnostic Statement no overt aspiration signs   Clinical Swallow Eval: Soft & Bite Sized   Mode of Presentation fed by clinician;spoon   Volume Presented tsps x 3   Oral Phase WFL   Pharyngeal Phase repeated swallows   Diagnostic Statement no overt aspiration signs   Swallowing Recommendations   Diet Consistency Recommendations easy to chew (level 7);thin liquids (level 0)  (when cleared to advance po per RN/CV surgery team)   Instrumental Assessment Recommendations   (to be determined)   Comment, Swallowing Recommendations easy to chew diet and thin liquids by cup with lid and small single sips, 1:1 assist, sit at 90 degrees, stay up for 30 minutes after eating, slow rate, extra swallows, meds crushed with puree; thicken liquids to mildly thick if increased coughing after thin liquids   Clinical Impression   Criteria for Skilled Therapeutic Interventions Met (SLP Eval) Yes, treatment indicated   SLP Diagnosis Mild oral-pharyngeal dysphagia   Risks & Benefits of therapy have been explained evaluation/treatment results reviewed;care plan/treatment goals reviewed;risks/benefits reviewed;current/potential barriers reviewed;participants voiced agreement with care plan;participants included;patient;spouse/significant other;daughter;son   Clinical Impression Comments Pt presents with mild oral-pharyngeal dysphagia per today's SLP bedside swallow evaluation.  Deficits/risk factors include recent CABG with  intubation until 5/12 am, RN report of pt coughing with thin liquids when taking meds this am, need for feeding assist, confusion, need for extra swallows, and impulsive large sips of thin liquids at times.  No overt aspiration signs noted during po trials with SLP assist during today's session.  When ok to advance diet per RN/CV surgery team, recommend an easy to chew diet and thin liquids by cup with lid and small single sips, 1:1 assist, cues to use the following: sit at 90 degrees, stay up for 30 minutes after eating, slow rate, extra swallows, meds crushed with puree; thicken liquids to mildly thick if increased coughing after thin liquids.  Plan to provide short term swallow Tx to assess diet tolerance, trial regular solids, and train strategies.   SLP Total Evaluation Time   Eval: oral/pharyngeal swallow function, clinical swallow Minutes (20649) 15   Interventions   Interventions Quick Adds Swallowing Dysfunction   Swallowing Intervention   Treatment of Swallowing Dysfunction &/or Oral Function for Feeding Minutes (89847) 10   Symptoms Noted During/After Treatment None   Treatment Detail/Skilled Intervention Pt was alert and cooperative.  Pt was distracted and moaning at times; but when asked if she had pain pt denied pain.  Educated pt, family, and RN re: findings and recommendations for safe swallow strategies and easy for chew diet when ok to advance per CV sugery team.  All verbalized understanding.  Mod-max cues given to alternate to thin liquids in Tx after assessment trials were completed.  Pt required mod-max assist to control cup for small sip without lid as pt began spilling when holding cup with only min assist.  No overt aspiration signs noted after swallow.   SLP Discharge Planning   SLP Plan Assess diet tolerance, train strategies; check for VFSS needs   SLP Discharge Recommendation Transitional Care Facility   SLP Rationale for DC Rec Swallow and cognitive function are below baseline; feeding  assist at discharge   SLP Brief overview of current status  Mild oral-pharyngeal dysphagia; Rec: easy to chew diet and thin liquids by cup with lid and small single sips, 1:1 assist, cues to use the following: sit at 90 degrees, stay up for 30 minutes after eating, slow rate, extra swallows, meds crushed with puree; thicken liquids to mildly thick if increased coughing after thin liquids   SLP Time and Intention   Total Session Time (sum of timed and untimed services) 25

## 2025-05-13 NOTE — PROGRESS NOTES
Critical Care Progress Note      05/12/2025    Name: Mikayla Sotomayor MRN#: [id number]   Age: 84 year old YOB: 1940     Hsptl Day# 2  ICU DAY #    MV DAY #             Problem List:   Active Problems:    Status post coronary angiogram    ST elevation MI (STEMI) (H)    S/P CABG x 2 with EF 35%  Circulatory shock - slowly titrating off of his Levophed and Norepinephrine as she recovers from surgery  Acute respiratory failure - was on ventilator earlier today, she did well on a breathing trial and was successfully extubated. We will continue to focus on pulmonary hygiene and increasing mobility.   CKD - creatinine 2.08 and modesty increased and will monitor going forward.   DM - glucoses ok at present  HTN         Summary/Hospital Course:           Assessment and plan :     Mikayla Sotomayor IS a 84 year old female admitted on 5/10/2025 for s/p cabg x 2.   I have personally reviewed the daily labs, imaging studies, cultures and discussed the case with referring physician and consulting physicians.     My assessment and plan by system for this patient is as follows:  /  Neurology/Psychiatry:   1. Sleepy when seen today after extubation but improving slowly    Cardiovascular:   1.Hemodynamics - compensated but requiring vasopressor support most of the day.     Pulmonary/Ventilator Management:   1. Extubated successfully today and focus on pulmonary hygiene and increasing mobility    GI and Nutrition :   1. PO's     Renal/Fluids/Electrolytes:   1. Creatinine 2.08    Infectious Disease:   1. Prophylactic antibiotics     Endocrine:   1. Glucoses ok with insulin     Hematology/Oncology:   1. Hb 8.9     IV/Access:   1. Venous access -   2. Arterial access -   3.  Plan  - central access required and necessary      ICU Prophylaxis:   1. DVT: Hep Subq/ LMWH/mechanical  2. VAP: HOB 30 degrees, chlorhexidine rinse  3. Stress Ulcer: PPI/H2 blocker  4. Restraints: Nonviolent soft two point restraints required and  necessary for patient safety and continued cares and good effect as patient continues to pull at necessary lines, tubes despite education and distraction. Will readdress daily.   5. IV Access - central access required and necessary for continued patient cares  6. Feeding - PO's   7. Family Update: discussed with family at bedside   8. Disposition - ICU care         Key goals for next 24 hours:   1.  2.  3.               Interim History:              Key Medications:     Current Facility-Administered Medications   Medication Dose Route Frequency Provider Last Rate Last Admin    acetaminophen (TYLENOL) tablet 975 mg  975 mg Oral Q8H Rodriguez Huitron MD   975 mg at 05/12/25 1339    aspirin (ASA) chewable tablet 162 mg  162 mg Oral or NG Tube Daily Rodriguez Huitron MD   162 mg at 05/12/25 0820    heparin ANTICOAGULANT injection 5,000 Units  5,000 Units Subcutaneous Q8H Rodriguez Huitron MD   5,000 Units at 05/12/25 1339    pantoprazole (PROTONIX) 2 mg/mL suspension 40 mg  40 mg Oral or NG Tube Daily Rodriguez Huitron MD   40 mg at 05/12/25 0821    Or    pantoprazole (PROTONIX) EC tablet 40 mg  40 mg Oral Daily Rodriguez Huitron MD        polyethylene glycol (MIRALAX) Packet 17 g  17 g Oral Daily Rodriguez Huitron MD   17 g at 05/12/25 0820    rosuvastatin (CRESTOR) tablet 5 mg  5 mg Oral Daily Bridget Jolley NP   5 mg at 05/12/25 0937    senna-docusate (SENOKOT-S/PERICOLACE) 8.6-50 MG per tablet 1 tablet  1 tablet Oral BID Rodriguez Huitron MD   1 tablet at 05/12/25 2001    sodium chloride (PF) 0.9% PF flush 3 mL  3 mL Intracatheter Q8H Rodriguez Huitron MD         Current Facility-Administered Medications   Medication Dose Route Frequency Provider Last Rate Last Admin    dexmedeTOMIDine (PRECEDEX) 4 mcg/mL in sodium chloride 0.9 % 100 mL infusion  0.2-0.7 mcg/kg/hr Intravenous Continuous Rodriguez Huitron MD   Stopped at 05/12/25 1000    EPINEPHrine (ADRENALIN) 5 mg in  mL infusion   0.01-0.1 mcg/kg/min Intravenous Continuous Rodriguez Huitron MD   Stopped at 05/12/25 1345    insulin regular (MYXREDLIN) 1 unit/mL infusion  0-24 Units/hr Intravenous Continuous Rodriguez Huitron MD 1 mL/hr at 05/12/25 2010 1 Units/hr at 05/12/25 2010    lactated ringers infusion   Intravenous Continuous Gabe Guzman MD 30 mL/hr at 05/12/25 2019 New Bag at 05/12/25 2019    niCARdipine 40 mg in 200 mL NS (CARDENE) infusion  0.5-15 mg/hr Intravenous Continuous PRN Rodriguez Huitron MD        norepinephrine (LEVOPHED) 4 mg in  mL infusion PREMIX  0.01-0.15 mcg/kg/min Intravenous Continuous PRN Rodriguez Huitron MD   Stopped at 05/12/25 1944    propofol (DIPRIVAN) infusion  5-75 mcg/kg/min Intravenous Continuous Gabe Guzman MD   Stopped at 05/11/25 2020    Reason beta blocker order not selected   Does not apply DOES NOT GO TO Rodriguez Daly MD        sodium chloride 0.9 % infusion   Intravenous Continuous Constantin Ovalle MD 20 mL/hr at 05/11/25 1942 Rate Verify at 05/11/25 1942    sodium chloride 0.9 % infusion   Intravenous Continuous Constantin Ovalle MD        vasopressin 0.2 units/mL in NS (PITRESSIN) standard conc infusion  0.5-4 Units/hr Intravenous Continuous PRN Rodriguez Huitron MD   Stopped at 05/12/25 0737               Physical Examination:   Temp:  [99  F (37.2  C)-101.3  F (38.5  C)] 99  F (37.2  C)  Pulse:  [60-81] 75  Resp:  [12-96] 16  BP: (104-149)/(42-80) 113/50  MAP:  [0 mmHg-116 mmHg] 79 mmHg  Arterial Line BP: (0-151)/(0-115) 123/55  FiO2 (%):  [30 %] 30 %  SpO2:  [92 %-100 %] 94 %    Intake/Output Summary (Last 24 hours) at 5/12/2025 2030  Last data filed at 5/12/2025 2000  Gross per 24 hour   Intake 2677.87 ml   Output 726 ml   Net 1951.87 ml     Wt Readings from Last 4 Encounters:   05/12/25 53.4 kg (117 lb 11.6 oz)   05/09/25 60.3 kg (133 lb)   04/18/25 60.6 kg (133 lb 8 oz)   10/09/24 63 kg (139 lb)     Arterial Line BP: (0-151)/(0-115)  123/55  MAP:  [0 mmHg-116 mmHg] 79 mmHg  BP - Mean:  [] 74  CVP:  [5 mmHg-226 mmHg] 16 mmHg  SVO2:  [70 %-76 %] 70 %  FiO2 (%): 30 %, Resp: 16, Inspiratory Pressure (cm H2O) (Drager Analilia): 5, Vent Mode: CPAP/PS, Resp Rate (Set): 14 breaths/min, Tidal Volume (Set, mL): 400 mL, PEEP (cm H2O): 5 cmH2O, Pressure Support (cm H2O): 5 cmH2O, Resp Rate (Set): 14 breaths/min, Tidal Volume (Set, mL): 400 mL, PEEP (cm H2O): 5 cmH2O  Recent Labs   Lab 05/12/25  1155 05/12/25  1154 05/12/25  0240 05/12/25  0238 05/11/25  2311 05/11/25  2304 05/11/25  1953   PH 7.42  --   --  7.47* 7.49*  --  7.48*   PCO2 39  --   --  33* 33*  --  31*   PO2 105  --   --  80 32*  --  118*   HCO3 25  --   --  24 25  --  23   O2PER 28 28 30 30 30   < > 40  40    < > = values in this interval not displayed.       GEN: no acute distress; comfortable after extubation    HEENT: head ncat, sclera anicteric, OP patent, trachea midline   PULM: unlabored synchronous with vent, clear anteriorly    CV/COR: RRR S1S2 no gallop,  No rub, no murmur  ABD: soft nontender,   EXT:  mild edema   warm  NEURO: grossly intact  SKIN: no obvious rash  LINES: clean, dry intact         Data:   All data and imaging reviewed     ROUTINE ICU LABS (Last four results)  CMP  Recent Labs   Lab 05/12/25  2008 05/12/25  1829 05/12/25  1647 05/12/25  1404 05/12/25  1154 05/12/25  0536 05/12/25  0532 05/11/25  2139 05/11/25  2135 05/11/25  1334 05/11/25  1318 05/11/25  0848 05/11/25  0544 05/10/25  0804 05/10/25  0742 05/09/25 2347 05/09/25 2044   NA  --   --   --   --  143  --  144  --  144  --  138   < > 138   < > 138  --  141   POTASSIUM  --   --   --   --  3.9  --  3.5  --  3.6  --  4.2   < > 3.9   < > 4.0  --  4.5   CHLORIDE  --   --   --   --  108*  --  108*  --  108*  --  103   < > 103   < > 101  --  104   CO2  --   --   --   --  24  --  22  --  25  --  15*   < > 23   < > 25  --  23   ANIONGAP  --   --   --   --  11  --  14  --  11  --  20*   < > 12   < > 12  --  14    * 121* 97 111* 119*   < > 134*   < > 142*   < > 215*   < > 115*   < > 136*   < > 145*   BUN  --   --   --   --  32.7*  --  29.4*  --  27.9*  --  27.1*   < > 32.4*   < > 31.0*  --  26.8*   CR  --   --   --   --  2.08*  --  2.00*  --  1.83*  --  1.61*   < > 1.95*   < > 1.68*  --  1.66*   GFRESTIMATED  --   --   --   --  23*  --  24*  --  26*  --  31*   < > 24*   < > 29*  --  30*   LISA  --   --   --   --  8.5*  --  8.6*  --  9.0  --  9.0   < > 9.1   < > 9.6  --  10.0   MAG  --   --   --   --   --   --  2.4*  --  2.6*  --  2.9*  --  2.2   < >  --   --  2.1   PHOS  --   --   --   --   --   --  3.5  --  1.1*  --  3.4  --  4.5   < >  --   --   --    PROTTOTAL  --   --   --   --   --   --   --   --  5.3*  --  5.4*  --   --   --  6.8  --  7.5   ALBUMIN  --   --   --   --   --   --   --   --  3.3*  --  3.4*  --   --   --  3.9  --  4.5   BILITOTAL  --   --   --   --   --   --   --   --  0.2  --  0.4  --   --   --  0.3  --  0.2   ALKPHOS  --   --   --   --   --   --   --   --  42  --  43  --   --   --  60  --  67   AST  --   --   --   --   --   --   --   --  122*  --   --   --   --   --  393*  --  31   ALT  --   --   --   --   --   --   --   --  11  --  16  --   --   --  22  --  11    < > = values in this interval not displayed.     CBC  Recent Labs   Lab 05/12/25  0532 05/11/25  1318 05/11/25  1150 05/11/25  1139 05/11/25  1138 05/11/25  0848 05/11/25  0544   WBC 20.8* 27.2*  --   --  22.3*  --  11.1*   RBC 2.89* 3.29*  --   --  2.58*  --  3.69*   HGB 8.9* 10.1* 7.1* 8.2* 8.1*   < > 11.2*   HCT 26.2* 31.0*  --   --  24.3*  --  34.5*   MCV 91 94  --   --  94  --  94   MCH 30.8 30.7  --   --  31.4  --  30.4   MCHC 34.0 32.6  --   --  33.3  --  32.5   RDW 13.6 13.6  --   --  13.5  --  13.4   * 156  --   --  121*  --  173    < > = values in this interval not displayed.     INR  Recent Labs   Lab 05/11/25  1318 05/11/25  1138 05/10/25  1739   INR 1.35* 1.47* 1.06     Arterial Blood Gas  Recent Labs   Lab  "05/12/25  1155 05/12/25  1154 05/12/25  0240 05/12/25  0238 05/11/25  2311 05/11/25  2304 05/11/25 1953   PH 7.42  --   --  7.47* 7.49*  --  7.48*   PCO2 39  --   --  33* 33*  --  31*   PO2 105  --   --  80 32*  --  118*   HCO3 25  --   --  24 25  --  23   O2PER 28 28 30 30 30   < > 40  40    < > = values in this interval not displayed.       All cultures:  No results for input(s): \"CULT\" in the last 168 hours.  Recent Results (from the past 24 hours)   XR Chest Port 1 View    Narrative    EXAM: XR CHEST PORT 1 VIEW  LOCATION: [facility]  DATE: 5/12/2025    INDICATION: Post Op CVTS Surgery  COMPARISON:5I/11/2025.      Impression    IMPRESSION: The ET tube NG tube and mediastinal drains, left-sided chest tube as well as the right IJ [place]-Rg catheter are all unchanged in position. Postsurgical changes from recent cardiac surgery are again noted, unchanged from prior exam. There is   mild central pulmonary venous congestion, similar prior exam. Slightly increased left basilar atelectasis is noted. No pneumothorax.       MD Sissy    Billing: This patient is critically ill: Yes. Total critical care time today 35 min.            "

## 2025-05-13 NOTE — PROGRESS NOTES
[facility]  Cardiovascular and Thoracic Surgery Daily Note    Today's Plans: 1. 1U pRBC 2. Lasix to follow 3. Up to chair 4 . Remove CTs if output remains down 5. Transition to sliding scale insulin 6. Inc therapies-->St 33    Assessment and Plan  Mikayla Sotomayor is a 84 year old female with a PMH of HTN, CKD stage 3, HLD and DM2. Presented to [facility] as NSTEMI and transferred to [facility] with IABP given critical LM stenosis and need for urgent CABG.    POD # 2 s/p coronary artery bypass grafting x2 (LIMA to LAD and SVG to OM) on 5/11/25 with Dr. Roberto Guzman. IABP removed intraoperatively.    - CVS:   Pre-op TTE with LVEF 35%, grade I diastolic dysfunction and preserved RV function. 1-2+ MR, 1-2+ TR  Carotid US demonstrates moderate bilateral carotid stenosis  HD stable overnight in NSR. Off gtt's    Postop vasoplegic shock-resolved  Support with fluid PRN   Continue  mg daily, BB deferred given pressor requirements, start rosuvastatin 5 mg daily and up titrate as able (new med for patient)  Optimize GDMT as able. CORE consult placed 5/12/25. Will need baseline echo prior to discharge  Chest tubes: in place to suction with ssang output, leave in. TPW: cap , only 280-will monitor/get up to chair and remove if able  PTA meds on hold: vasotec, hydralazine, hydrodiuril     - Resp: Postoperative mechanical ventilation. Extubated pod#1, sating well on RA, encourage IS    - Neuro: Neuros intact, pain controlled on current regimen.      - Renal: ZANE on CKD stage 3. BL Cr appears to be ~1.6. Trend BMP. Volume management as above.  Recent Labs   Lab 05/13/25  0430 05/12/25  1154 05/12/25  0532   CR 2.06* 2.08* 2.00*       - GI: no BM, no flatus, continue bowel regimen    - : Remove Navarro    - Endo: Postop stress hyperglycemia. DM2.  Transition to SSI  Hemoglobin A1C   Date Value Ref Range Status   05/10/2025 6.0 (H) <5.7 % Final     Comment:     Normal <5.7%   Prediabetes 5.7-6.4%    Diabetes 6.5% or  higher     Note: Adopted from ADA consensus guidelines.   01/07/2019 6.9 (H) 0 - 5.6 % Final     Comment:     Normal <5.7% Prediabetes 5.7-6.4%  Diabetes 6.5% or higher - adopted from ADA   consensus guidelines.          - FEN: Replace electrolytes as needed. ADAT once extubated.     - ID: Postop leukocytosis. Tmax 101.3. Periop abx prophylaxis completing. Trend CBC and fever curve.   Recent Labs   Lab 05/13/25  0430 05/12/25  0532 05/11/25  1318   WBC 19.6* 20.8* 27.2*       - Heme: Acute blood loss anemia and thrombocytopenia due to surgery. Hgb and PLT stable. Trend CBC, transfuse PRN. Transfuse 1U PRBc this am  Recent Labs   Lab 05/13/25  0430 05/12/25  0532 05/11/25  1318   HGB 7.6* 8.9* 10.1*   PLT 90* 125* 156       - Proph: SCD, subcutaneous heparin, PPI    - Other:  Clinically Significant Risk Factors        # Hypokalemia: Lowest K = 3.3 mmol/L in last 2 days, will replace as needed   # Hyperchloremia: Highest Cl = 108 mmol/L in last 2 days, will monitor as appropriate      # Hypocalcemia: Lowest Ca = 8.2 mg/dL in last 2 days, will monitor and replace as appropriate  # Hypercalcemia: Highest iCa = 5.3 mg/dL in last 2 days, will monitor as appropriate   # Anion Gap Metabolic Acidosis: Highest Anion Gap = 20 mmol/L in last 2 days, will monitor and treat as appropriate  # Hypoalbuminemia: Lowest albumin = 3.3 g/dL at 5/11/2025  9:35 PM, will monitor as appropriate    # Coagulation Defect: INR = 1.35 (Ref range: 0.85 - 1.15) and/or PTT = 33 Seconds (Ref range: 22 - 38 Seconds), will monitor for bleeding  # Thrombocytopenia: Lowest platelets = 90 in last 2 days, will monitor for bleeding   # Hypertension: Noted on problem list  # Chronic heart failure with reduced ejection fraction: last echo with EF <40%    # Acute Hypoxic Respiratory Failure: Based on blood gas results. Continue supplemental oxygen as needed         # Overweight: Estimated body mass index is 26.05 kg/m  as calculated from the following:     "Height as of 5/9/25: 1.499 m (4' 11\").    Weight as of this encounter: 58.5 kg (128 lb 15.5 oz)., PRESENT ON ADMISSION      # History of CABG: noted on surgical history       - Dispo: ICU-->St 33, continue therapies, up to chair      Interval History  Lying in bed, confused, slept a little last night, appears comfortable, denies pain, tolerating clears    Medications  Current Facility-Administered Medications   Medication Dose Route Frequency Provider Last Rate Last Admin    acetaminophen (TYLENOL) tablet 975 mg  975 mg Oral Q8H Rodriguez Huitron MD   975 mg at 05/13/25 0546    aspirin (ASA) chewable tablet 162 mg  162 mg Oral or NG Tube Daily Rodriguez Huitron MD   162 mg at 05/12/25 0820    heparin ANTICOAGULANT injection 5,000 Units  5,000 Units Subcutaneous Q12H Flower Baeza PA-C        pantoprazole (PROTONIX) 2 mg/mL suspension 40 mg  40 mg Oral or NG Tube Daily Rodriguez Huitron MD   40 mg at 05/12/25 0821    Or    pantoprazole (PROTONIX) EC tablet 40 mg  40 mg Oral Daily Rodriguez Huitron MD        polyethylene glycol (MIRALAX) Packet 17 g  17 g Oral Daily Rodriguez Huitron MD   17 g at 05/12/25 0820    rosuvastatin (CRESTOR) tablet 5 mg  5 mg Oral Daily Bridget Jolley NP   5 mg at 05/12/25 0937    senna-docusate (SENOKOT-S/PERICOLACE) 8.6-50 MG per tablet 1 tablet  1 tablet Oral BID Rodriguez Huitron MD   1 tablet at 05/12/25 2001    sodium chloride (PF) 0.9% PF flush 3 mL  3 mL Intracatheter Q8H Rodriguez Huitron MD   3 mL at 05/13/25 0608     Current Facility-Administered Medications   Medication Dose Route Frequency Provider Last Rate Last Admin    acetaminophen (TYLENOL) tablet 650 mg  650 mg Oral Q4H PRN Constantin Ovalle MD   650 mg at 05/11/25 1357    [START ON 5/14/2025] bisacodyl (DULCOLAX) suppository 10 mg  10 mg Rectal Daily PRN Rodriguez Huitron MD        calcium gluconate 1 g in 50 mL in sodium chloride intermittent infusion  1 g Intravenous Once PRN Elana, " MD Rodriguez        calcium gluconate 2 g in  mL intermittent infusion  2 g Intravenous Once PRN Rodriguez Huitron MD        calcium gluconate 3 g in sodium chloride 0.9 % 100 mL intermittent infusion  3 g Intravenous Once PRN Rodriguez Huitron MD        glucose gel 15-30 g  15-30 g Oral Q15 Min PRN Rodriguez Huitron MD        Or    dextrose 50 % injection 25-50 mL  25-50 mL Intravenous Q15 Min PRN Rodriguez Huitron MD        Or    glucagon injection 1 mg  1 mg Subcutaneous Q15 Min PRN Rodriguez Huitron MD        hydrALAZINE (APRESOLINE) injection 10 mg  10 mg Intravenous Q30 Min PRN Rodriguez Huitron MD        HYDROmorphone (DILAUDID) injection 0.1 mg  0.1 mg Intravenous Q4H PRN Rodriguez Huitron MD   0.1 mg at 05/12/25 1356    Or    HYDROmorphone (DILAUDID) injection 0.2 mg  0.2 mg Intravenous Q4H PRN Rodriguez Huitron MD   0.2 mg at 05/13/25 0601    lactated ringers BOLUS 500 mL  500 mL Intravenous Once PRN Bridget Jolley NP        lidocaine (LMX4) cream   Topical Q1H PRN Rodriguez Huitron MD        lidocaine 1 % 0.1-1 mL  0.1-1 mL Other Q1H PRN Rodriguez Huitron MD        magnesium hydroxide (MILK OF MAGNESIA) suspension 30 mL  30 mL Oral Daily PRN Rodriguez Huitron MD        melatonin tablet 3 mg  3 mg Oral At Bedtime PRN Mely Saini MD   3 mg at 05/10/25 2224    naloxone (NARCAN) injection 0.2 mg  0.2 mg Intravenous Q2 Min PRN Pierre CardioMD sheryl        Or    naloxone (NARCAN) injection 0.4 mg  0.4 mg Intravenous Q2 Min PRN Pierre CardioMD sheryl        Or    naloxone (NARCAN) injection 0.2 mg  0.2 mg Intramuscular Q2 Min PRN Pierre CardioMD sheryl        Or    naloxone (NARCAN) injection 0.4 mg  0.4 mg Intramuscular Q2 Min PRN Pierre CardioMD sheryl        niCARdipine 40 mg in 200 mL NS (CARDENE) infusion  0.5-15 mg/hr Intravenous Continuous PRN Rodriguez Huitron MD        norepinephrine (LEVOPHED) 4 mg in  mL infusion PREMIX  0.01-0.15  mcg/kg/min Intravenous Continuous PRN Rodriguez Huitron MD   Stopped at 05/13/25 0029    ondansetron (ZOFRAN ODT) ODT tab 4 mg  4 mg Oral Q6H PRN Rodriguez Huitron MD        Or    ondansetron (ZOFRAN) injection 4 mg  4 mg Intravenous Q6H PRN Rodriguez Huitron MD        oxyCODONE IR (ROXICODONE) half-tab 2.5 mg  2.5 mg Oral Q4H PRN Rodriguez Huitron MD   2.5 mg at 05/12/25 2323    Or    oxyCODONE (ROXICODONE) tablet 5 mg  5 mg Oral Q4H PRN Rodriguez Huitron MD   5 mg at 05/12/25 0350    prochlorperazine (COMPAZINE) injection 5 mg  5 mg Intravenous Q6H PRN Rodriguez Huitron MD        Or    prochlorperazine (COMPAZINE) tablet 5 mg  5 mg Oral Q6H PRN Rodriguez Huitron MD        Reason beta blocker order not selected   Does not apply DOES NOT GO TO Rodriguez Daly MD        sodium chloride (PF) 0.9% PF flush 3 mL  3 mL Intracatheter q1 min prn Rodriguez Huitron MD        sodium chloride 0.9% BOLUS 1-250 mL  1-250 mL Intravenous Q1H PRN Leydi Roblero PA-C        vasopressin 0.2 units/mL in NS (PITRESSIN) standard conc infusion  0.5-4 Units/hr Intravenous Continuous PRN Rodriguez Huitron MD   Stopped at 05/12/25 0737         Physical Exam  Vitals were reviewed  Blood pressure 115/58, pulse 90, temperature 99.1  F (37.3  C), resp. rate 25, weight 58.5 kg (128 lb 15.5 oz), SpO2 99%.  Rhythm: NSR    Lungs: Sating well on RA    Cardiovascular: Distant, S1, S2, RRR, no m/r/g    Abdomen: soft, NT, ND     Extremeties: warm, no LE edema    Incision: CDI    CT: serosang output, no air leak    Weight:   Vitals:    05/11/25 0600 05/12/25 0515 05/13/25 0559   Weight: 57.4 kg (126 lb 8.7 oz) 53.4 kg (117 lb 11.6 oz) 58.5 kg (128 lb 15.5 oz)         Data  Recent Labs   Lab 05/13/25  0805 05/13/25  0624 05/13/25  0430 05/12/25  1404 05/12/25  1154 05/12/25  0536 05/12/25  0532 05/11/25  2139 05/11/25  2135 05/11/25  1334 05/11/25  1318 05/11/25  1139 05/11/25  1138 05/10/25  1853 05/10/25  1739  05/10/25  0804 05/10/25  0742   WBC  --   --  19.6*  --   --   --  20.8*  --   --   --  27.2*  --  22.3*   < >  --   --  12.4*   HGB  --   --  7.6*  --   --   --  8.9*  --   --   --  10.1*   < > 8.1*   < >  --   --  12.9   MCV  --   --  91  --   --   --  91  --   --   --  94  --  94   < >  --   --  92   PLT  --   --  90*  --   --   --  125*  --   --   --  156  --  121*   < >  --   --  240   INR  --   --   --   --   --   --   --   --   --   --  1.35*  --  1.47*  --  1.06  --   --    NA  --   --  139  --  143  --  144  --  144  --  138   < > 140   < > 140  --  138   POTASSIUM  --   --  4.1  --  3.9  --  3.5  --  3.6  --  4.2   < > 4.4   < > 4.4  --  4.0   CHLORIDE  --   --  104  --  108*  --  108*  --  108*  --  103  --  106   < > 102  --  101   CO2  --   --  23  --  24  --  22  --  25  --  15*  --  20*   < > 22  --  25   BUN  --   --  37.6*  --  32.7*  --  29.4*  --  27.9*  --  27.1*  --  28.1*   < > 28.6*  --  31.0*   CR  --   --  2.06*  --  2.08*  --  2.00*  --  1.83*  --  1.61*  --  1.67*   < > 1.66*  --  1.68*   ANIONGAP  --   --  12  --  11  --  14  --  11  --  20*  --  14   < > 16*  --  12   LISA  --   --  8.2*  --  8.5*  --  8.6*  --  9.0  --  9.0  --  9.0   < > 9.7  --  9.6   * 104* 88   < > 119*   < > 134*   < > 142*   < > 215*   < > 152*   < > 98   < > 136*   ALBUMIN  --   --   --   --   --   --   --   --  3.3*  --  3.4*  --   --   --   --   --  3.9   PROTTOTAL  --   --   --   --   --   --   --   --  5.3*  --  5.4*  --   --   --   --   --  6.8   BILITOTAL  --   --   --   --   --   --   --   --  0.2  --  0.4  --   --   --   --   --  0.3   ALKPHOS  --   --   --   --   --   --   --   --  42  --  43  --   --   --   --   --  60   ALT  --   --   --   --   --   --   --   --  11  --  16  --   --   --   --   --  22   AST  --   --   --   --   --   --   --   --  122*  --   --   --   --   --   --   --  393*    < > = values in this interval not displayed.       Imaging:  Recent Results (from the past 24 hours)    XR Chest Port 1 View    Narrative    EXAM: XR CHEST PORT 1 VIEW  LOCATION: [facility]  DATE: 5/13/2025    INDICATION: Confirm IABP  placement  COMPARISON: Multiple recent chest x-rays.      Impression    IMPRESSION:   1.  No intra-aortic balloon pump identified.  2.  Mediastinal and left-sided pleural-based drains with small left pleural effusion. No pneumothorax identified (lung apices excluded).  3.  Mild cardiomegaly with CABG.  4.  Right IJ [place]-Rg catheter in distal main pulmonary artery.                        Patient seen and discussed with Dr. Mulvihill Erin Frendin, PA-C  Cardiothoracic Surgery  Pager: [phone]    CV Surgery Rounding Pager: [phone]  After hours please page surgeon on-call  ]

## 2025-05-14 ENCOUNTER — APPOINTMENT (OUTPATIENT)
Dept: GENERAL RADIOLOGY | Facility: CLINIC | Age: 85
DRG: 233 | End: 2025-05-14
Attending: PHYSICIAN ASSISTANT
Payer: COMMERCIAL

## 2025-05-14 LAB
ANION GAP SERPL CALCULATED.3IONS-SCNC: 13 MMOL/L (ref 7–15)
ATRIAL RATE - MUSE: 61 BPM
ATRIAL RATE - MUSE: 62 BPM
ATRIAL RATE - MUSE: 90 BPM
BUN SERPL-MCNC: 45.7 MG/DL (ref 8–23)
CA-I BLD-MCNC: 4.5 MG/DL (ref 4.4–5.2)
CALCIUM SERPL-MCNC: 8.5 MG/DL (ref 8.8–10.4)
CHLORIDE SERPL-SCNC: 104 MMOL/L (ref 98–107)
CREAT SERPL-MCNC: 1.94 MG/DL (ref 0.51–0.95)
DIASTOLIC BLOOD PRESSURE - MUSE: NORMAL MMHG
EGFRCR SERPLBLD CKD-EPI 2021: 25 ML/MIN/1.73M2
ERYTHROCYTE [DISTWIDTH] IN BLOOD BY AUTOMATED COUNT: 16.4 % (ref 10–15)
GLUCOSE BLDC GLUCOMTR-MCNC: 133 MG/DL (ref 70–99)
GLUCOSE BLDC GLUCOMTR-MCNC: 134 MG/DL (ref 70–99)
GLUCOSE BLDC GLUCOMTR-MCNC: 156 MG/DL (ref 70–99)
GLUCOSE BLDC GLUCOMTR-MCNC: 161 MG/DL (ref 70–99)
GLUCOSE BLDC GLUCOMTR-MCNC: 162 MG/DL (ref 70–99)
GLUCOSE SERPL-MCNC: 139 MG/DL (ref 70–99)
HCO3 SERPL-SCNC: 21 MMOL/L (ref 22–29)
HCT VFR BLD AUTO: 30 % (ref 35–47)
HGB BLD-MCNC: 10.4 G/DL (ref 11.7–15.7)
INTERPRETATION ECG - MUSE: NORMAL
MAGNESIUM SERPL-MCNC: 2.5 MG/DL (ref 1.7–2.3)
MCH RBC QN AUTO: 30.1 PG (ref 26.5–33)
MCHC RBC AUTO-ENTMCNC: 34.7 G/DL (ref 31.5–36.5)
MCV RBC AUTO: 87 FL (ref 78–100)
P AXIS - MUSE: 40 DEGREES
P AXIS - MUSE: 50 DEGREES
P AXIS - MUSE: 52 DEGREES
PHOSPHATE SERPL-MCNC: 3.9 MG/DL (ref 2.5–4.5)
PLATELET # BLD AUTO: 107 10E3/UL (ref 150–450)
POTASSIUM SERPL-SCNC: 4 MMOL/L (ref 3.4–5.3)
PR INTERVAL - MUSE: 142 MS
PR INTERVAL - MUSE: 156 MS
PR INTERVAL - MUSE: 160 MS
QRS DURATION - MUSE: 88 MS
QRS DURATION - MUSE: 92 MS
QRS DURATION - MUSE: 94 MS
QT - MUSE: 424 MS
QT - MUSE: 440 MS
QT - MUSE: 464 MS
QTC - MUSE: 446 MS
QTC - MUSE: 467 MS
QTC - MUSE: 518 MS
R AXIS - MUSE: 49 DEGREES
R AXIS - MUSE: 68 DEGREES
R AXIS - MUSE: 71 DEGREES
RBC # BLD AUTO: 3.45 10E6/UL (ref 3.8–5.2)
SODIUM SERPL-SCNC: 138 MMOL/L (ref 135–145)
SYSTOLIC BLOOD PRESSURE - MUSE: NORMAL MMHG
T AXIS - MUSE: 147 DEGREES
T AXIS - MUSE: 181 DEGREES
T AXIS - MUSE: 247 DEGREES
VENTRICULAR RATE- MUSE: 61 BPM
VENTRICULAR RATE- MUSE: 62 BPM
VENTRICULAR RATE- MUSE: 90 BPM
WBC # BLD AUTO: 17 10E3/UL (ref 4–11)

## 2025-05-14 PROCEDURE — 85014 HEMATOCRIT: CPT | Performed by: PHYSICIAN ASSISTANT

## 2025-05-14 PROCEDURE — 250N000013 HC RX MED GY IP 250 OP 250 PS 637: Performed by: PHYSICIAN ASSISTANT

## 2025-05-14 PROCEDURE — 36415 COLL VENOUS BLD VENIPUNCTURE: CPT | Performed by: PHYSICIAN ASSISTANT

## 2025-05-14 PROCEDURE — 82330 ASSAY OF CALCIUM: CPT | Performed by: PHYSICIAN ASSISTANT

## 2025-05-14 PROCEDURE — 84100 ASSAY OF PHOSPHORUS: CPT | Performed by: PHYSICIAN ASSISTANT

## 2025-05-14 PROCEDURE — 71045 X-RAY EXAM CHEST 1 VIEW: CPT

## 2025-05-14 PROCEDURE — 83735 ASSAY OF MAGNESIUM: CPT | Performed by: PHYSICIAN ASSISTANT

## 2025-05-14 PROCEDURE — 250N000011 HC RX IP 250 OP 636: Mod: JZ | Performed by: PHYSICIAN ASSISTANT

## 2025-05-14 PROCEDURE — 250N000011 HC RX IP 250 OP 636: Performed by: PHYSICIAN ASSISTANT

## 2025-05-14 PROCEDURE — 120N000013 HC R&B IMCU

## 2025-05-14 PROCEDURE — 80048 BASIC METABOLIC PNL TOTAL CA: CPT | Performed by: PHYSICIAN ASSISTANT

## 2025-05-14 PROCEDURE — 82310 ASSAY OF CALCIUM: CPT | Performed by: PHYSICIAN ASSISTANT

## 2025-05-14 RX ORDER — METOPROLOL TARTRATE 25 MG/1
25 TABLET, FILM COATED ORAL 2 TIMES DAILY
Status: DISCONTINUED | OUTPATIENT
Start: 2025-05-14 | End: 2025-05-15

## 2025-05-14 RX ORDER — FUROSEMIDE 10 MG/ML
20 INJECTION INTRAMUSCULAR; INTRAVENOUS ONCE
Status: COMPLETED | OUTPATIENT
Start: 2025-05-14 | End: 2025-05-14

## 2025-05-14 RX ADMIN — OXYCODONE HYDROCHLORIDE 2.5 MG: 5 TABLET ORAL at 03:06

## 2025-05-14 RX ADMIN — HEPARIN SODIUM 5000 UNITS: 5000 INJECTION, SOLUTION INTRAVENOUS; SUBCUTANEOUS at 18:09

## 2025-05-14 RX ADMIN — ACETAMINOPHEN 975 MG: 325 TABLET, FILM COATED ORAL at 13:09

## 2025-05-14 RX ADMIN — HYDRALAZINE HYDROCHLORIDE 10 MG: 20 INJECTION INTRAMUSCULAR; INTRAVENOUS at 06:09

## 2025-05-14 RX ADMIN — HYDRALAZINE HYDROCHLORIDE 10 MG: 20 INJECTION INTRAMUSCULAR; INTRAVENOUS at 02:37

## 2025-05-14 RX ADMIN — PANTOPRAZOLE SODIUM 40 MG: 40 TABLET, DELAYED RELEASE ORAL at 08:58

## 2025-05-14 RX ADMIN — ACETAMINOPHEN 975 MG: 325 TABLET, FILM COATED ORAL at 05:53

## 2025-05-14 RX ADMIN — METOPROLOL TARTRATE 12.5 MG: 25 TABLET, FILM COATED ORAL at 13:08

## 2025-05-14 RX ADMIN — ROSUVASTATIN CALCIUM 5 MG: 5 TABLET, FILM COATED ORAL at 08:58

## 2025-05-14 RX ADMIN — ONDANSETRON 4 MG: 2 INJECTION, SOLUTION INTRAMUSCULAR; INTRAVENOUS at 09:13

## 2025-05-14 RX ADMIN — INSULIN ASPART 1 UNITS: 100 INJECTION, SOLUTION INTRAVENOUS; SUBCUTANEOUS at 13:08

## 2025-05-14 RX ADMIN — HEPARIN SODIUM 5000 UNITS: 5000 INJECTION, SOLUTION INTRAVENOUS; SUBCUTANEOUS at 05:53

## 2025-05-14 RX ADMIN — ACETAMINOPHEN 975 MG: 325 TABLET, FILM COATED ORAL at 21:45

## 2025-05-14 RX ADMIN — SENNOSIDES AND DOCUSATE SODIUM 2 TABLET: 50; 8.6 TABLET ORAL at 08:58

## 2025-05-14 RX ADMIN — METOPROLOL TARTRATE 12.5 MG: 25 TABLET, FILM COATED ORAL at 08:58

## 2025-05-14 RX ADMIN — FUROSEMIDE 20 MG: 10 INJECTION, SOLUTION INTRAMUSCULAR; INTRAVENOUS at 13:09

## 2025-05-14 RX ADMIN — SENNOSIDES AND DOCUSATE SODIUM 2 TABLET: 50; 8.6 TABLET ORAL at 20:34

## 2025-05-14 RX ADMIN — METOPROLOL TARTRATE 25 MG: 25 TABLET, FILM COATED ORAL at 20:34

## 2025-05-14 RX ADMIN — ASPIRIN 81 MG CHEWABLE TABLET 162 MG: 81 TABLET CHEWABLE at 08:58

## 2025-05-14 ASSESSMENT — ACTIVITIES OF DAILY LIVING (ADL)
ADLS_ACUITY_SCORE: 57
ADLS_ACUITY_SCORE: 53
ADLS_ACUITY_SCORE: 57
ADLS_ACUITY_SCORE: 57
ADLS_ACUITY_SCORE: 53
ADLS_ACUITY_SCORE: 55
ADLS_ACUITY_SCORE: 53
ADLS_ACUITY_SCORE: 57
ADLS_ACUITY_SCORE: 53
ADLS_ACUITY_SCORE: 55
ADLS_ACUITY_SCORE: 53
ADLS_ACUITY_SCORE: 53
ADLS_ACUITY_SCORE: 57

## 2025-05-14 NOTE — PLAN OF CARE
"Goal Outcome Evaluation:  Patient Name: Juana  MRN: [id number]  Date of Admission: 5/10/2025  Reason for Admission: S/P CABG  [place] [facility] [place]    Vitals:   BP Readings from Last 1 Encounters:   05/14/25 110/50     Pulse Readings from Last 1 Encounters:   05/14/25 69     Wt Readings from Last 1 Encounters:   05/14/25 68.2 kg (150 lb 5.7 oz)     Ht Readings from Last 1 Encounters:   05/09/25 1.499 m (4' 11\")     Estimated body mass index is 30.37 kg/m  as calculated from the following:    Height as of 5/9/25: 1.499 m (4' 11\").    Weight as of this encounter: 68.2 kg (150 lb 5.7 oz).  Temp Readings from Last 1 Encounters:   05/14/25 98.1  F (36.7  C) (Oral)     Pain: Pain goal:0 Pain Rating: Denies Effective pain medication/regimen: Scheduled Tylenol and PRN Oxycodone    CV Surgery Patient: Yes Post Op Day #:3    Assessment    General: Afebrile yes  Rhythm: normal sinus rhythm  Blood Pressure Medications given/held: Amio Held Beta blocker Given Other: None  Resp: Oxygen Status: RA  Patient slept last night Yes Approx hours slept 6 hours  Incentive Spirometry Q 1-2 hour when awake: no Volume:AMRIT  Neuro: Alert no Orientation: self  GI/:          Bowel Activity: no if yes indicate when:AMRIT          Bowel Medications: yes          Urinary Catheter: yes  Skin:          Incision: Incision status: opened          Epicardial Pacing Wires: yes  Chest Tubes   Pleural: yes Draining: yes               Suction: yes              Mediastinal: yes Draining: yes               Suction: yes   Dressing Change Daily: yes If no, why?       Assessment:    Resp: Diminished lung sounds,Room air.  Telemetry: SR  VS: Vital signs stable except for high BP,PRN Hydralazine given  Neuro: Alert and oriented to self,confused,follows command  Diet: Clears,Pills crashed with apple sauce  GI/: NO BM on this shift,amrit passing gas.Adequate urine output thru rand catheter.  Skin/Wounds: Sternal incision with wound vac connected,CT site,R " Groin site and L Thigh and Lower Leg harvest site,redness on sacral.Scattered bruising.  Lines/Drains: L PIV SL,CT to (-)20 suction,Wound vac to sternal incision,Navarro catheter.  Activity: Assist x 2,T/R  Sleep: Sleep between cares  Abnormal Labs: On K+,Mg,Phos protocol,recheck bi am.    Aggression Stop Light: Green          Patient Care Plan: Continue plan of care.

## 2025-05-14 NOTE — PROGRESS NOTES
[facility]  Cardiovascular and Thoracic Surgery Daily Note    Today's Plans: 1. 1U pRBC 2. Lasix to follow 3. Up to chair 4 . Remove CTs if output remains down 5. Transition to sliding scale insulin 6. Inc therapies-->St [address]    Assessment and Plan  Mikayla Sotomayor is a 84 year old female with a PMH of HTN, CKD stage 3, HLD and DM2. Presented to R as NSTEMI and transferred to [facility] with IABP given critical LM stenosis and need for urgent CABG.    POD # 3 s/p coronary artery bypass grafting x2 (LIMA to LAD and SVG to OM) on 5/11/25 with Dr. Roberto Guzman. IABP removed intraoperatively.    - CVS:   Pre-op TTE with LVEF 35%, grade I diastolic dysfunction and preserved RV function. 1-2+ MR, 1-2+ TR  Carotid US demonstrates moderate bilateral carotid stenosis  HD stable overnight in NSR. Off pressors by POD#1 evening    Postop vasoplegic shock-resolved  Support with fluid PRN   Continue  mg daily, increase Lopressor 12.5 mg BID to 25 mg BID with hold parameters for HTN this morning, continue rosuvastatin 5 mg daily and up titrate as able (new med for patient)  Optimize GDMT as able. CORE consult placed 5/12/25 and note dropped to schedule at discharge with CORE. Will need baseline echo prior to discharge  Chest tubes: in place to suction with ssang output of 260 mL last 24hrs, did not get up with PT this morning so will try to mobilize this afternoon to see output and look at removing if able. TPW: capped  PTA meds on hold: vasotec, hydralazine, hydrodiuril     - Resp: Postoperative mechanical ventilation. Extubated pod#1, sating well on RA, encourage IS    - Neuro: Neuros intact, pain controlled on current regimen.      - Renal: ZANE on CKD stage 3. BL Cr appears to be ~1.6. Trend BMP. Volume management as above.  Recent Labs   Lab 05/14/25  0536 05/13/25  0430 05/12/25  1154   CR 1.94* 2.06* 2.08*       - GI: + BM, + flatus, continue bowel regimen    - : Remove Navarro today, POD#3    - Endo:  "Postop stress hyperglycemia. DM2.  Transitioned to sliding scale insulin and has had minimal need, likely can discontinue tomorrow if no need for >24hr  Hemoglobin A1C   Date Value Ref Range Status   05/10/2025 6.0 (H) <5.7 % Final     Comment:     Normal <5.7%   Prediabetes 5.7-6.4%    Diabetes 6.5% or higher     Note: Adopted from ADA consensus guidelines.   01/07/2019 6.9 (H) 0 - 5.6 % Final     Comment:     Normal <5.7% Prediabetes 5.7-6.4%  Diabetes 6.5% or higher - adopted from ADA   consensus guidelines.          - FEN: Replace electrolytes as needed. ADAT to regular, remains on clear.     - ID: Postop leukocytosis, improving. Tmax 98.1F. Periop abx prophylaxis completed. Trend CBC and fever curve.   Recent Labs   Lab 05/14/25  0536 05/13/25  0430 05/12/25  0532   WBC 17.0* 19.6* 20.8*       - Heme: Acute blood loss anemia and thrombocytopenia due to surgery. Hgb and PLT stable. Trend CBC, transfuse PRN. Transfuse 1U PRBc 5/13 am  Recent Labs   Lab 05/14/25  0536 05/13/25  0430 05/12/25  0532   HGB 10.4* 7.6* 8.9*   * 90* 125*       - Proph: SCD, subcutaneous heparin, PPI    - Other:  Clinically Significant Risk Factors          # Hyperchloremia: Highest Cl = 108 mmol/L in last 2 days, will monitor as appropriate          # Hypoalbuminemia: Lowest albumin = 3.3 g/dL at 5/11/2025  9:35 PM, will monitor as appropriate     # Thrombocytopenia: Lowest platelets = 90 in last 2 days, will monitor for bleeding   # Hypertension: Noted on problem list  # Acute heart failure with reduced ejection fraction: last echo with EF <40% and receiving IV diuretics    # Acute Hypoxic Respiratory Failure: Based on blood gas results. Continue supplemental oxygen as needed         # Obesity: Estimated body mass index is 30.37 kg/m  as calculated from the following:    Height as of 5/9/25: 1.499 m (4' 11\").    Weight as of this encounter: 68.2 kg (150 lb 5.7 oz)., PRESENT ON ADMISSION      # History of CABG: noted on surgical " history       - Dispo: Continue cares on [address], continue therapies, up to chair as able. Work with therapies. Nausea this morning so did not work with therapy. Encourage OOB for meals and advance diet as able. Therapies recommending TCU at discharge.     Medically Ready for Discharge: Anticipated in 2-4 Days, need CT out, BP controlled and medically stable.       Interval History    NAEO. Laying in bed confused, son by side. Daughter and  to be here later.   Was depressed earlier per RN and stated she wanted to just die, but doing better after pain and nausea improved.   Saturating well on RA.  HD stable.   Will start yas counts as not eating much to make sure getting in intake and discussed with RN on advancing diet as able as well.   To remove rand today.   To work with therapies today to mobilize fluid and look to remove CT if able, continue with diuresis in meantime per Dr Guzman.     Medications  Current Facility-Administered Medications   Medication Dose Route Frequency Provider Last Rate Last Admin    acetaminophen (TYLENOL) tablet 975 mg  975 mg Oral Q8H Flower Baeza PA-C   975 mg at 05/14/25 0553    aspirin (ASA) chewable tablet 162 mg  162 mg Oral or NG Tube Daily Flower Baeza PA-C   162 mg at 05/13/25 0850    heparin ANTICOAGULANT injection 5,000 Units  5,000 Units Subcutaneous Q12H Flower Baeza PA-C   5,000 Units at 05/14/25 0553    insulin aspart (NovoLOG) injection (RAPID ACTING)  1-7 Units Subcutaneous TID AC Flower Baeza PA-C   1 Units at 05/13/25 1734    insulin aspart (NovoLOG) injection (RAPID ACTING)  1-5 Units Subcutaneous At Bedtime Flower Baeza PA-C        metoprolol tartrate (LOPRESSOR) half-tab 12.5 mg  12.5 mg Oral BID Flower Baeza PA-C   12.5 mg at 05/13/25 2053    pantoprazole (PROTONIX) 2 mg/mL suspension 40 mg  40 mg Oral or NG Tube Daily Flower Baeza PA-C   40 mg at 05/12/25 0821    Or    pantoprazole (PROTONIX) EC tablet 40 mg  40  mg Oral Daily Flower Baeza PA-C   40 mg at 05/13/25 0850    polyethylene glycol (MIRALAX) Packet 17 g  17 g Oral Daily Flower Baeza PA-C   17 g at 05/13/25 0850    rosuvastatin (CRESTOR) tablet 5 mg  5 mg Oral Daily Flower Baeza PA-C   5 mg at 05/13/25 0850    senna-docusate (SENOKOT-S/PERICOLACE) 8.6-50 MG per tablet 2 tablet  2 tablet Oral BID Flower Baeza PA-C   2 tablet at 05/13/25 2053    sodium chloride (PF) 0.9% PF flush 3 mL  3 mL Intracatheter Q8H Flower Baeza PA-C   3 mL at 05/14/25 0553     Current Facility-Administered Medications   Medication Dose Route Frequency Provider Last Rate Last Admin    acetaminophen (TYLENOL) tablet 650 mg  650 mg Oral Q4H PRN Flower Baeza PA-C   650 mg at 05/11/25 1357    bisacodyl (DULCOLAX) suppository 10 mg  10 mg Rectal Daily PRN Flower Baeza PA-C        calcium gluconate 1 g in 50 mL in sodium chloride intermittent infusion  1 g Intravenous Once PRN Flower Baeza PA-C        calcium gluconate 2 g in  mL intermittent infusion  2 g Intravenous Once PRN Flower Baeza PA-C        calcium gluconate 3 g in sodium chloride 0.9 % 100 mL intermittent infusion  3 g Intravenous Once PRN Flower Baeza PA-C        glucose gel 15-30 g  15-30 g Oral Q15 Min PRN Flower Baeza PA-C        Or    dextrose 50 % injection 25-50 mL  25-50 mL Intravenous Q15 Min PRN Flower Baeza PA-C        Or    glucagon injection 1 mg  1 mg Subcutaneous Q15 Min PRN Flower Baeza PA-C        hydrALAZINE (APRESOLINE) injection 10 mg  10 mg Intravenous Q30 Min PRN Flower Baeza PA-C   10 mg at 05/14/25 0609    lactated ringers BOLUS 500 mL  500 mL Intravenous Once PRN Flower Baeza PA-C        lidocaine (LMX4) cream   Topical Q1H PRN Flower Baeza PA-C        lidocaine 1 % 0.1-1 mL  0.1-1 mL Other Q1H PRN Flower Baeza PA-C        magnesium hydroxide (MILK OF MAGNESIA) suspension 30 mL  30 mL Oral  Daily PRN Flower Baeza PA-C        melatonin tablet 3 mg  3 mg Oral At Bedtime PRN Flower Baeza PA-C   3 mg at 05/13/25 2223    naloxone (NARCAN) injection 0.2 mg  0.2 mg Intravenous Q2 Min PRN Flower Baeza PA-C        Or    naloxone (NARCAN) injection 0.4 mg  0.4 mg Intravenous Q2 Min PRN Flower Baeza PA-C        Or    naloxone (NARCAN) injection 0.2 mg  0.2 mg Intramuscular Q2 Min PRN Flower Baeza PA-C        Or    naloxone (NARCAN) injection 0.4 mg  0.4 mg Intramuscular Q2 Min PRN Flower Baeza PA-C        ondansetron (ZOFRAN ODT) ODT tab 4 mg  4 mg Oral Q6H PRN Flower Baeza PA-C        Or    ondansetron (ZOFRAN) injection 4 mg  4 mg Intravenous Q6H PRN Flower Baeza PA-C        oxyCODONE IR (ROXICODONE) half-tab 2.5 mg  2.5 mg Oral Q4H PRN Flower Baeza PA-C   2.5 mg at 05/14/25 0306    Or    oxyCODONE (ROXICODONE) tablet 5 mg  5 mg Oral Q4H PRN Flower Baeza PA-C   5 mg at 05/12/25 0350    prochlorperazine (COMPAZINE) injection 5 mg  5 mg Intravenous Q6H PRN Flower Baeza PA-C        Or    prochlorperazine (COMPAZINE) tablet 5 mg  5 mg Oral Q6H PRN Flower Baeza PA-C        Reason beta blocker order not selected   Does not apply DOES NOT GO TO Flower Townsend PA-C        sodium chloride (PF) 0.9% PF flush 3 mL  3 mL Intracatheter q1 min prn Flower Baeza PA-C        sodium chloride 0.9% BOLUS 1-250 mL  1-250 mL Intravenous Q1H PRN Flower Baeza PA-C             Physical Exam  Vitals were reviewed  Blood pressure (!) 148/56, pulse 85, temperature 98.1  F (36.7  C), temperature source Oral, resp. rate 18, weight 68.2 kg (150 lb 5.7 oz), SpO2 96%.    Rhythm: NSR    Lungs: Sating well on RA    Cardiovascular: Distant, S1, S2, RRR, no m/r/g    Abdomen: soft, NT, ND     Extremeties: warm, no LE edema    Incision: CDI, wound vac in place over sternal incision. No output noted in [place] cannister.     CT: serosang output, no  air leak    Weight:   Vitals:    05/11/25 0600 05/12/25 0515 05/13/25 0559 05/14/25 0306   Weight: 57.4 kg (126 lb 8.7 oz) 53.4 kg (117 lb 11.6 oz) 58.5 kg (128 lb 15.5 oz) 68.2 kg (150 lb 5.7 oz)         Data  Recent Labs   Lab 05/14/25  0536 05/14/25  0136 05/13/25  2208 05/13/25  0624 05/13/25  0430 05/12/25  1404 05/12/25  1154 05/12/25  0536 05/12/25  0532 05/11/25  2139 05/11/25  2135 05/11/25  1334 05/11/25  1318 05/11/25  1139 05/11/25  1138 05/10/25  1853 05/10/25  1739 05/10/25  0804 05/10/25  0742   WBC 17.0*  --   --   --  19.6*  --   --   --  20.8*  --   --   --  27.2*  --  22.3*   < >  --   --  12.4*   HGB 10.4*  --   --   --  7.6*  --   --   --  8.9*  --   --   --  10.1*   < > 8.1*   < >  --   --  12.9   MCV 87  --   --   --  91  --   --   --  91  --   --   --  94  --  94   < >  --   --  92   *  --   --   --  90*  --   --   --  125*  --   --   --  156  --  121*   < >  --   --  240   INR  --   --   --   --   --   --   --   --   --   --   --   --  1.35*  --  1.47*  --  1.06  --   --      --   --   --  139  --  143  --  144  --  144  --  138   < > 140   < > 140  --  138   POTASSIUM 4.0  --   --   --  4.1  --  3.9  --  3.5  --  3.6  --  4.2   < > 4.4   < > 4.4  --  4.0   CHLORIDE 104  --   --   --  104  --  108*  --  108*  --  108*  --  103  --  106   < > 102  --  101   CO2 21*  --   --   --  23  --  24  --  22  --  25  --  15*  --  20*   < > 22  --  25   BUN 45.7*  --   --   --  37.6*  --  32.7*  --  29.4*  --  27.9*  --  27.1*  --  28.1*   < > 28.6*  --  31.0*   CR 1.94*  --   --   --  2.06*  --  2.08*  --  2.00*  --  1.83*  --  1.61*  --  1.67*   < > 1.66*  --  1.68*   ANIONGAP 13  --   --   --  12  --  11  --  14  --  11  --  20*  --  14   < > 16*  --  12   LISA 8.5*  --   --   --  8.2*  --  8.5*  --  8.6*  --  9.0  --  9.0  --  9.0   < > 9.7  --  9.6   * 162* 137*   < > 88   < > 119*   < > 134*   < > 142*   < > 215*   < > 152*   < > 98   < > 136*   ALBUMIN  --   --   --   --   --    --   --   --   --   --  3.3*  --  3.4*  --   --   --   --   --  3.9   PROTTOTAL  --   --   --   --   --   --   --   --   --   --  5.3*  --  5.4*  --   --   --   --   --  6.8   BILITOTAL  --   --   --   --   --   --   --   --   --   --  0.2  --  0.4  --   --   --   --   --  0.3   ALKPHOS  --   --   --   --   --   --   --   --   --   --  42  --  43  --   --   --   --   --  60   ALT  --   --   --   --   --   --   --   --   --   --  11  --  16  --   --   --   --   --  22   AST  --   --   --   --   --   --   --   --   --   --  122*  --   --   --   --   --   --   --  393*    < > = values in this interval not displayed.       Imaging:  Recent Results (from the past 24 hours)   XR Chest Port 1 View    Narrative    EXAM: XR CHEST PORT 1 VIEW  LOCATION: [facility]  DATE: 5/14/2025    INDICATION: Confirm IABP  placement  COMPARISON: Portable chest radiograph 05/13/2025.      Impression    IMPRESSION: No IABP marker visualized. Prior median sternotomy and CABG. Mediastinal and pleural drains in stable position. No pneumothorax. Interval removal of the right internal jugular Madison-Rg catheter. Stable enlarged cardiomediastinal silhouette.   Dense atherosclerotic calcifications of the aortic arch. Mild central pulmonary vascular congestion, slightly progressed. Possible trace pleural effusions. Left basilar atelectasis versus infiltrate. Unchanged osseous structures         Patient seen and discussed with Dr. Mulvihill Alexandra Murphy, PA-C   Cardiothoracic Surgery  Pager: [phone]  Available on "[employer]"    CV Surgery Rounding Pager: [phone]  After hours please page surgeon on-call  ]

## 2025-05-14 NOTE — OP NOTE
DATE OF SERVICE:  5/11/25     PREOPERATIVE DIAGNOSES:  1.  Critical left main coronary artery disease  2.  Chronic kidney disease  3.  Hypertension  4.  ST elevation myocardial infarction     POSTOPERATIVE DIAGNOSES:  1.  Critical left main coronary artery disease  2.  Chronic kidney disease  3.  Hypertension  4.  ST elevation myocardial infarction     PROCEDURE PERFORMED:  1.  Coronary artery bypass grafting x 2   -reversed saphenous vein graft to the second obtuse marginal branch of the left circumflex coronary artery  -in-situ left internal mammary artery to the left anterior descending coronary artery.  2.  Endoscopic harvest of the greater saphenous vein from the left lower extremity.  3.  Epiaortic ultrasonography    SURGEON:  NOEMY Guzman MD     RESIDENT/FELLOW SURGEON:  Rodriguez Huitron MD.    FIRST ASSISTANT: Elodia Joya PA-C     ANESTHESIA:  General endotracheal anesthesia.     ESTIMATED BLOOD LOSS:  500 mL     SPECIMEN:  None.    CPB/XC:  53 / 42 minutes     INDICATIONS FOR PROCEDURE:  Mikayla Sotomayor is an 84-year-old female who presented with a STEMI. Urgent coronary angiography demonstrated a heavily calcified critical left main lesion. Echocardiography demonstrated preserved left ventricular function and no significant valvular abnormality. High risk urgent coronary artery bypass grafting was recommended. A pre-operative intra-aortic balloon pump was placed. The patient understood the risks and benefits of the procedure and wished to undergo the operation.     OPERATIVE FINDINGS:      The left internal mammary artery was 2 mm in diameter and had excellent flow.  The greater saphenous vein from the left lower extremity was 2 mm in diameter and a suitable conduit for bypass.     The ascending aorta was free of calcified plaque.     The second obtuse marginal branch of the left circumflex coronary artery was 2 mm in diameter and free of disease at the site of anastomosis.   The first obtuse  marginal branch of the left circumflex coronary artery was too small for bypass grafting.  The left anterior descending coronary artery was 2 mm in diameter and free of disease at the site of anastomosis.    After reperfusion, sinus rhythm was restored.    Left ventricular function was normal preoperatively and unchanged after bypass on low-dose inotropic support.     OPERATIVE DESCRIPTION IN DETAIL:  After obtaining informed consent, the patient was brought to the operating room and placed in the supine position on the operating room table. Appropriate lines and devices for monitoring were placed by the anesthesia team.  The patient was prepped and draped, and a timeout was performed to confirm the correct patient identity, as well as the procedure to be performed. A median sternotomy was performed and the left internal mammary artery was harvested in a skeletonized fashion. The greater saphenous vein was harvested from the left lower extremity using endoscopic harvesting by the physician assistant, Elodia Joya PA-C.     The patient was given full dose heparin to achieve an activated clotting time over 480 seconds.      Epiaortic ultrasonography was used to identify a suitable spot for aortic cannulation and cross-clamping. Following this, the distal ascending aorta and the right atrium was cannulated. Next, both retrograde and antegrade cardioplegia cannulae were placed. Cardiopulmonary bypass was commenced. Cardioplegia was created. The conduits and targets were inspected. The heart was arrested with 1 liter of cold blood cardioplegia antegrade, followed by 200 ml retrograde. Subsequent maintenance doses of 300 mL of cold retrograde blood cardioplegia were given approximately every 20 minutes or after each distal anastomosis. Electrochemical silence was observed. Topical cold saline slush was applied for additional protection.     The following grafts were constructed:    -Reversed saphenous vein graft to the  second obtuse marginal branch of the left circumflex coronary artery in an end-to-side fashion  -In-situ left internal mammary artery to the mid left anterior descending coronary artery in an end-to-side fashion    The proximal anastomoses of the vein graft was constructed on the ascending aorta in an end-to-side fashion using running 6-0 Prolene under a single crossclamp.      The crossclamp was released after a retrograde dose of terminal warm blood cardioplegia was delivered, and the heart was resuscitated. All anastomoses were inspected and determined to be hemostatic. A ventricular pacing lead was placed and brought out through a separate stab incision. Ventilation was resumed. The patient weaned from cardiopulmonary bypass, was given protamine and decannulated. All cannulation sites were oversewn. Chest tubes were placed and brought out through separate stab incisions. The sternal edges were reapposed with multiple stainless steel wires. The skin and soft tissues were closed in multiple layers of running absorbable suture.    All sponge, instrument, and needle counts were correct at the end of the case.      NOEMY Guzman MD  Cardiothoracic Surgery  Pager [phone]

## 2025-05-15 ENCOUNTER — APPOINTMENT (OUTPATIENT)
Dept: OCCUPATIONAL THERAPY | Facility: CLINIC | Age: 85
DRG: 233 | End: 2025-05-15
Payer: COMMERCIAL

## 2025-05-15 ENCOUNTER — APPOINTMENT (OUTPATIENT)
Dept: CARDIOLOGY | Facility: CLINIC | Age: 85
DRG: 233 | End: 2025-05-15
Attending: NURSE PRACTITIONER
Payer: COMMERCIAL

## 2025-05-15 ENCOUNTER — APPOINTMENT (OUTPATIENT)
Dept: SPEECH THERAPY | Facility: CLINIC | Age: 85
DRG: 233 | End: 2025-05-15
Payer: COMMERCIAL

## 2025-05-15 VITALS
TEMPERATURE: 98.7 F | OXYGEN SATURATION: 94 % | RESPIRATION RATE: 16 BRPM | BODY MASS INDEX: 29.57 KG/M2 | SYSTOLIC BLOOD PRESSURE: 108 MMHG | HEART RATE: 73 BPM | DIASTOLIC BLOOD PRESSURE: 48 MMHG | WEIGHT: 146.39 LBS

## 2025-05-15 LAB
ANION GAP SERPL CALCULATED.3IONS-SCNC: 12 MMOL/L (ref 7–15)
BUN SERPL-MCNC: 47 MG/DL (ref 8–23)
CALCIUM SERPL-MCNC: 8.6 MG/DL (ref 8.8–10.4)
CHLORIDE SERPL-SCNC: 102 MMOL/L (ref 98–107)
CREAT SERPL-MCNC: 1.87 MG/DL (ref 0.51–0.95)
EGFRCR SERPLBLD CKD-EPI 2021: 26 ML/MIN/1.73M2
ERYTHROCYTE [DISTWIDTH] IN BLOOD BY AUTOMATED COUNT: 15.6 % (ref 10–15)
GLUCOSE BLDC GLUCOMTR-MCNC: 115 MG/DL (ref 70–99)
GLUCOSE BLDC GLUCOMTR-MCNC: 121 MG/DL (ref 70–99)
GLUCOSE BLDC GLUCOMTR-MCNC: 138 MG/DL (ref 70–99)
GLUCOSE BLDC GLUCOMTR-MCNC: 186 MG/DL (ref 70–99)
GLUCOSE SERPL-MCNC: 116 MG/DL (ref 70–99)
HCO3 SERPL-SCNC: 24 MMOL/L (ref 22–29)
HCT VFR BLD AUTO: 32.5 % (ref 35–47)
HGB BLD-MCNC: 10.8 G/DL (ref 11.7–15.7)
LVEF ECHO: NORMAL
MAGNESIUM SERPL-MCNC: 2.5 MG/DL (ref 1.7–2.3)
MCH RBC QN AUTO: 29.8 PG (ref 26.5–33)
MCHC RBC AUTO-ENTMCNC: 33.2 G/DL (ref 31.5–36.5)
MCV RBC AUTO: 90 FL (ref 78–100)
PHOSPHATE SERPL-MCNC: 3.3 MG/DL (ref 2.5–4.5)
PLATELET # BLD AUTO: 170 10E3/UL (ref 150–450)
POTASSIUM SERPL-SCNC: 3.5 MMOL/L (ref 3.4–5.3)
RBC # BLD AUTO: 3.62 10E6/UL (ref 3.8–5.2)
SODIUM SERPL-SCNC: 138 MMOL/L (ref 135–145)
WBC # BLD AUTO: 13.5 10E3/UL (ref 4–11)

## 2025-05-15 PROCEDURE — 93325 DOPPLER ECHO COLOR FLOW MAPG: CPT | Mod: 26 | Performed by: INTERNAL MEDICINE

## 2025-05-15 PROCEDURE — 93308 TTE F-UP OR LMTD: CPT | Mod: 26 | Performed by: INTERNAL MEDICINE

## 2025-05-15 PROCEDURE — 84100 ASSAY OF PHOSPHORUS: CPT | Performed by: PHYSICIAN ASSISTANT

## 2025-05-15 PROCEDURE — 83735 ASSAY OF MAGNESIUM: CPT | Performed by: PHYSICIAN ASSISTANT

## 2025-05-15 PROCEDURE — 80048 BASIC METABOLIC PNL TOTAL CA: CPT | Performed by: PHYSICIAN ASSISTANT

## 2025-05-15 PROCEDURE — 120N000013 HC R&B IMCU

## 2025-05-15 PROCEDURE — 250N000013 HC RX MED GY IP 250 OP 250 PS 637: Performed by: PHYSICIAN ASSISTANT

## 2025-05-15 PROCEDURE — 250N000011 HC RX IP 250 OP 636: Performed by: NURSE PRACTITIONER

## 2025-05-15 PROCEDURE — 93325 DOPPLER ECHO COLOR FLOW MAPG: CPT

## 2025-05-15 PROCEDURE — 97535 SELF CARE MNGMENT TRAINING: CPT | Mod: GO

## 2025-05-15 PROCEDURE — 250N000013 HC RX MED GY IP 250 OP 250 PS 637: Performed by: STUDENT IN AN ORGANIZED HEALTH CARE EDUCATION/TRAINING PROGRAM

## 2025-05-15 PROCEDURE — 36415 COLL VENOUS BLD VENIPUNCTURE: CPT | Performed by: PHYSICIAN ASSISTANT

## 2025-05-15 PROCEDURE — 250N000013 HC RX MED GY IP 250 OP 250 PS 637: Performed by: NURSE PRACTITIONER

## 2025-05-15 PROCEDURE — 93321 DOPPLER ECHO F-UP/LMTD STD: CPT | Mod: 26 | Performed by: INTERNAL MEDICINE

## 2025-05-15 PROCEDURE — 85018 HEMOGLOBIN: CPT | Performed by: PHYSICIAN ASSISTANT

## 2025-05-15 PROCEDURE — 85014 HEMATOCRIT: CPT | Performed by: PHYSICIAN ASSISTANT

## 2025-05-15 PROCEDURE — 92526 ORAL FUNCTION THERAPY: CPT | Mod: GN

## 2025-05-15 PROCEDURE — 250N000011 HC RX IP 250 OP 636: Performed by: PHYSICIAN ASSISTANT

## 2025-05-15 RX ORDER — METOPROLOL SUCCINATE 50 MG/1
50 TABLET, EXTENDED RELEASE ORAL DAILY
Status: DISCONTINUED | OUTPATIENT
Start: 2025-05-15 | End: 2025-05-19 | Stop reason: HOSPADM

## 2025-05-15 RX ORDER — HYDRALAZINE HYDROCHLORIDE 10 MG/1
10 TABLET, FILM COATED ORAL 3 TIMES DAILY
Status: DISCONTINUED | OUTPATIENT
Start: 2025-05-15 | End: 2025-05-17

## 2025-05-15 RX ORDER — FUROSEMIDE 10 MG/ML
20 INJECTION INTRAMUSCULAR; INTRAVENOUS ONCE
Status: COMPLETED | OUTPATIENT
Start: 2025-05-15 | End: 2025-05-15

## 2025-05-15 RX ORDER — POTASSIUM CHLORIDE 20MEQ/15ML
10 LIQUID (ML) ORAL ONCE
Status: COMPLETED | OUTPATIENT
Start: 2025-05-15 | End: 2025-05-15

## 2025-05-15 RX ADMIN — ACETAMINOPHEN 975 MG: 325 TABLET, FILM COATED ORAL at 06:06

## 2025-05-15 RX ADMIN — FUROSEMIDE 20 MG: 10 INJECTION, SOLUTION INTRAMUSCULAR; INTRAVENOUS at 06:29

## 2025-05-15 RX ADMIN — ACETAMINOPHEN 975 MG: 325 TABLET, FILM COATED ORAL at 15:03

## 2025-05-15 RX ADMIN — HYDRALAZINE HYDROCHLORIDE 10 MG: 10 TABLET ORAL at 21:17

## 2025-05-15 RX ADMIN — HEPARIN SODIUM 5000 UNITS: 5000 INJECTION, SOLUTION INTRAVENOUS; SUBCUTANEOUS at 18:57

## 2025-05-15 RX ADMIN — HEPARIN SODIUM 5000 UNITS: 5000 INJECTION, SOLUTION INTRAVENOUS; SUBCUTANEOUS at 06:06

## 2025-05-15 RX ADMIN — SENNOSIDES AND DOCUSATE SODIUM 2 TABLET: 50; 8.6 TABLET ORAL at 08:37

## 2025-05-15 RX ADMIN — ACETAMINOPHEN 975 MG: 325 TABLET, FILM COATED ORAL at 21:16

## 2025-05-15 RX ADMIN — QUETIAPINE 12.5 MG: 25 TABLET, FILM COATED ORAL at 21:17

## 2025-05-15 RX ADMIN — PANTOPRAZOLE SODIUM 40 MG: 40 TABLET, DELAYED RELEASE ORAL at 08:30

## 2025-05-15 RX ADMIN — METOPROLOL SUCCINATE 50 MG: 50 TABLET, FILM COATED, EXTENDED RELEASE ORAL at 08:30

## 2025-05-15 RX ADMIN — POTASSIUM CHLORIDE 10 MEQ: 1.5 SOLUTION ORAL at 11:39

## 2025-05-15 RX ADMIN — ASPIRIN 81 MG CHEWABLE TABLET 162 MG: 81 TABLET CHEWABLE at 08:30

## 2025-05-15 RX ADMIN — HYDRALAZINE HYDROCHLORIDE 10 MG: 10 TABLET ORAL at 15:03

## 2025-05-15 RX ADMIN — INSULIN ASPART 1 UNITS: 100 INJECTION, SOLUTION INTRAVENOUS; SUBCUTANEOUS at 12:09

## 2025-05-15 RX ADMIN — ROSUVASTATIN CALCIUM 5 MG: 5 TABLET, FILM COATED ORAL at 08:31

## 2025-05-15 ASSESSMENT — ACTIVITIES OF DAILY LIVING (ADL)
ADLS_ACUITY_SCORE: 60
ADLS_ACUITY_SCORE: 53
ADLS_ACUITY_SCORE: 55
ADLS_ACUITY_SCORE: 60
ADLS_ACUITY_SCORE: 53
ADLS_ACUITY_SCORE: 60
ADLS_ACUITY_SCORE: 59
ADLS_ACUITY_SCORE: 60
ADLS_ACUITY_SCORE: 53
ADLS_ACUITY_SCORE: 55
ADLS_ACUITY_SCORE: 60
ADLS_ACUITY_SCORE: 60
ADLS_ACUITY_SCORE: 53
ADLS_ACUITY_SCORE: 59
ADLS_ACUITY_SCORE: 53
ADLS_ACUITY_SCORE: 55
ADLS_ACUITY_SCORE: 55
ADLS_ACUITY_SCORE: 60

## 2025-05-15 NOTE — PROGRESS NOTES
Care Management Follow Up    Length of Stay (days): 5    Expected Discharge Date: 05/18/2025     Concerns to be Addressed: discharge planning     Patient plan of care discussed at interdisciplinary rounds: Yes    Anticipated Discharge Disposition: Transitional Care       Anticipated Discharge Services:    Anticipated Discharge DME:      Patient/family educated on Medicare website which has current facility and service quality ratings: no  Education Provided on the Discharge Plan: Yes  Patient/Family in Agreement with the Plan: yes    Referrals Placed by CM/SW: Post Acute Facilities  Private pay costs discussed: Not applicable    Discussed  Partnership in Safe Discharge Planning  document with patient/family: No     Handoff Completed: No, handoff not indicated or clinically appropriate    Additional Information:  ELIZABETH reviewed chart and spoke with PAULINE. OT recommends TCU. SW met with pt's daughter Melissa and spouse Sincere, along with PAULINE. Discussed/explained TCU. Family is in agreement. Provided a TCU list. First choice is [facility] due to location. Will send referral. SW encouraged them to come up with a few more choices in case [facility] is full. Pt has [employer] Medicare Advantage and SW explained the prior-auth process. Pt will need this in place prior to discharge. SW also provided information on the Senior Linkage Line and a Senior Housing Guide book.    Next Steps: Secure TCU bed and BCBS prior-auth. SW following for discharge planning.     SAMMY Alonso, LICSW  [phone] Desk phone  [phone] Cell/text (Preferred)  [facility]    Available on [employer]

## 2025-05-15 NOTE — PLAN OF CARE
Goal Outcome Evaluation:    Orientations: disoriented to situation, forgetful  Vitals/Pain: VSS on RA  Tele: SR with PACs and prolonged QT  Lines/Drains: L PIV SL, wound VAC with no output, rand removed, 3 chest tubes to 1 Atrium  Skin/Wounds: scattered bruising, cracked peeling skin on back of ears and heels  GI/: rand removed this shift, no BM  Labs Abnormal/Trends: , 156, 133  Ambulation/Assist: A2 Lift, not OOB this shift  Sleep Quality: Rest promoted  Plan: continue managing BP, monitor chest tube output, PT to work with pt tmrw

## 2025-05-15 NOTE — PLAN OF CARE
"Goal Outcome Evaluation:  Patient Name: Juana  MRN: [id number]  Date of Admission: 5/10/2025  Reason for Admission: CABGx2  Level of Care: [facility] [place]    Vitals:   BP Readings from Last 1 Encounters:  05/15/25 : 136/65    Pulse Readings from Last 1 Encounters:  05/15/25 : 73    Wt Readings from Last 1 Encounters:  05/15/25 : 66.4 kg (146 lb 6.2 oz)    Ht Readings from Last 1 Encounters:  05/09/25 : 1.499 m (4' 11\")    Estimated body mass index is 29.57 kg/m  as calculated from the following:    Height as of 5/9/25: 1.499 m (4' 11\").    Weight as of this encounter: 66.4 kg (146 lb 6.2 oz).  Temp Readings from Last 1 Encounters:  05/15/25 : 97.8  F (36.6  C) (Axillary)      Pain: Pain goal 0/10 Pain Rating 2/10 Effective pain medication/regimen Scheduled tylenol, ice packs    CV Surgery Patient: Yes Post Op Day #: 4    Assessment    General: Afebrile yes  Rhythm: normal sinus rhythm w/ ST abnormality and PVCs  Blood Pressure Medications given/held: Beta blocker Given Other Hydralazine given  Resp: Oxygen Status: RA  Patient slept last night Yes   Incentive Spirometry Q 1-2 hour when awake: yes Volume: 500  Neuro: Alert yes Orientation: self and place  GI/:          Bowel Activity: yes if yes indicate when: 5/15          Bowel Medications: yes          Urinary Catheter: no- replace on next straight cath per DAY Gill  Skin:          Incision: Incision status: healing well          Epicardial Pacing Wires: no  Chest Tubes   Removed at 1200 today.       Assessment    Resp: RA  Telemetry: SR with ST abnormality and PVCs  Neuro: A&Ox2. Knows her name and birth date and that she is in the hospital but does not know why or the time. Very forgetful, repeats same questions every couple of minutes.   GI/: Regular diet. Olaf count x3 days. Pt voided and had BM when being lifted in sling today x2. After 2nd void, PVR was 353 so pt straight cath'd for 325. If pt needs an additional straight cath, we should replace rand per " DAY Gill.   Skin/Wounds: Sternal incision JUDAH, wound vac removed today. Old CT sites covered. Right groin site. LLE harvest sites. Left knee site has staples. Scattered bruising.   Lines/Drains: PIV SL  Activity: Ax2 lift. T/R in bed. 2 chair x2. Stood with CR- see their notes.   Sleep: Fair  Abnormal Labs: Replaced K, recheck all lytes in AM.     Aggression Stop Light: Green          Patient Care Plan: TCU at discharge, SW following for placement.

## 2025-05-15 NOTE — PROGRESS NOTES
Speech Language Therapy Discharge Summary    Reason for therapy discharge:    All goals and outcomes met, no further needs identified.    Progress towards therapy goal(s). See goals on Care Plan in [facility] electronic health record for goal details.  Goals met    Therapy recommendation(s):    No further therapy is recommended.    Recommend a regular texture diet and thin liquids.

## 2025-05-15 NOTE — PLAN OF CARE
"Goal Outcome Evaluation:      Overall Patient Progress: no changeOverall Patient Progress: no change    Patient Name: Juana  MRN: [id number]  Date of Admission: 5/10/2025  Reason for Admission: CABG x 2  Level of Care: CV IMC    Vitals:   BP Readings from Last 1 Encounters:   05/15/25 133/65     Pulse Readings from Last 1 Encounters:   05/15/25 77     Wt Readings from Last 1 Encounters:   05/15/25 66.4 kg (146 lb 6.2 oz)     Ht Readings from Last 1 Encounters:   05/09/25 1.499 m (4' 11\")     Estimated body mass index is 29.57 kg/m  as calculated from the following:    Height as of 5/9/25: 1.499 m (4' 11\").    Weight as of this encounter: 66.4 kg (146 lb 6.2 oz).  Temp Readings from Last 1 Encounters:   05/15/25 98  F (36.7  C) (Oral)     Pain: Pain goal:0 Pain Rating: Denies Effective pain medication/regimen: Scheduled Tylenol      CV Surgery Patient: Yes Post Op Day #:4     Assessment     General: Afebrile yes  Rhythm: normal sinus rhythm  Blood Pressure Medications given/held: Amio Held Beta blocker Given Other: None  Resp: Oxygen Status: RA  Patient slept last night Yes Approx hours slept 6 hours  Incentive Spirometry Q 1-2 hour when awake: no Volume:AMRIT  Neuro: Alert no Orientation: self  GI/:          Bowel Activity: no if yes indicate when:AMRIT          Bowel Medications: yes          Urinary Catheter: yes  Skin:          Incision: Incision status: opened          Epicardial Pacing Wires: yes  Chest Tubes              Pleural: yes Draining: yes               Suction: yes              Mediastinal: yes Draining: yes               Suction: yes              Dressing Change Daily: yes If no, why?         Assessment:     Resp: Diminished lung sounds,Room air.  Telemetry: SR  VS: Vital signs stable   Neuro: Alert and oriented to self,confused,follows command  Diet: Clears,Pills crashed with apple sauce  GI/: NO BM on this shift,amrit passing gas.On external catheter in placed.No urine output,latest bladder " scan residual is 358,see chart.Lasix 20 mg IV given.  Skin/Wounds: Sternal incision with wound vac connected,CT site,R Groin site and L Thigh and Lower Leg harvest site,redness on sacral.Scattered bruising.  Lines/Drains: L PIV SL,CT to (-)20 suction,Wound vac to sternal incision.  Activity: Assist x 2,T/R  Sleep: Sleep between cares  Abnormal Labs: On K+,Mg,Phos protocol     Aggression Stop Light: Green          Patient Care Plan: Continue plan of care.

## 2025-05-15 NOTE — PROGRESS NOTES
CLINICAL NUTRITION SERVICES - ASSESSMENT NOTE    Registered Dietitian Interventions:  - Encouraged supplement consumption to increase PO intakes.   - Discussed the importance of protein intakes for post operative healing.   - Patient needs assistance with feeding  - Olaf Cts 5/15-5/17    Future/Additional Recommendations:  - Monitor adequacy of PO intakes.     Reason for Assessment: Calorie Count Assessment    Reason for Admission: ST elevation MI (STEMI), Status post coronary angiogram   PMH:   Past Medical History:   Diagnosis Date    Essential hypertension, benign     Fam hx-cardiovas dis NEC     Family history of diabetes mellitus     Mitral valve disorders(424.0) 3/5/2014    Under care of  Dr Richelle Toledo MD, consultant in cardiology     Osteopenia     Premature atrial complexes 3/5/2014    Under care of  Dr Richelle Toledo MD, consultant in cardiology      SUBJECTIVE INFORMATION  Assessed patient in room.    NUTRITION HISTORY  Patient reports no changes in diet PTA.     CURRENT NUTRITION ORDERS  Diet: Easy to Chew (L7)    CURRENT INTAKE/TOLERANCE  25-50% intakes since diet advancement this morning. Drank 1/2 Ensure.     LABS  Nutrition-relevant labs: Reviewed  Lab Results   Component Value Date     05/14/2025    POTASSIUM 4.0 05/14/2025     (H) 05/15/2025    BUN 45.7 (H) 05/14/2025    CR 1.94 (H) 05/14/2025    GFRESTIMATED 25 (L) 05/14/2025    OLAF 8.5 (L) 05/14/2025    MAG 2.5 (H) 05/14/2025    PHOS 3.9 05/14/2025    ALBUMIN 3.3 (L) 05/11/2025     MEDICATIONS  Nutrition-relevant medications: Reviewed and diuretic  Current Facility-Administered Medications   Medication Dose Route Frequency Provider Last Rate Last Admin    acetaminophen (TYLENOL) tablet 650 mg  650 mg Oral Q4H PRN Flower Baeza PA-C   650 mg at 05/11/25 1357    acetaminophen (TYLENOL) tablet 975 mg  975 mg Oral Q8H Flower Baeza PA-C   975 mg at 05/15/25 0606    aspirin (ASA) chewable tablet 162 mg  162 mg Oral or NG  Tube Daily Flower Baeza PA-C   162 mg at 05/14/25 0858    bisacodyl (DULCOLAX) suppository 10 mg  10 mg Rectal Daily PRN Flower Baeza PA-C        glucose gel 15-30 g  15-30 g Oral Q15 Min PRN Flower Baeza PA-C        Or    dextrose 50 % injection 25-50 mL  25-50 mL Intravenous Q15 Min PRN Flower Baeza PA-C        Or    glucagon injection 1 mg  1 mg Subcutaneous Q15 Min PRN Flower Baeza PA-C        heparin ANTICOAGULANT injection 5,000 Units  5,000 Units Subcutaneous Q12H Flower Baeza PA-C   5,000 Units at 05/15/25 0606    hydrALAZINE (APRESOLINE) injection 10 mg  10 mg Intravenous Q30 Min PRN Flower Baeza PA-C   10 mg at 05/14/25 0609    insulin aspart (NovoLOG) injection (RAPID ACTING)  1-7 Units Subcutaneous TID AC Flower Baeza PA-C   1 Units at 05/14/25 1308    insulin aspart (NovoLOG) injection (RAPID ACTING)  1-5 Units Subcutaneous At Bedtime Flower Baeza PA-C        lactated ringers BOLUS 500 mL  500 mL Intravenous Once PRN Flower Baeza PA-C        lidocaine (LMX4) cream   Topical Q1H PRN Flower Baeza PA-C        lidocaine 1 % 0.1-1 mL  0.1-1 mL Other Q1H PRN Flower Baeza PA-C        magnesium hydroxide (MILK OF MAGNESIA) suspension 30 mL  30 mL Oral Daily PRN Flower Baeza PA-C        melatonin tablet 3 mg  3 mg Oral At Bedtime PRN Flower Baeza PA-C   3 mg at 05/13/25 2223    metoprolol succinate ER (TOPROL XL) 24 hr tablet 50 mg  50 mg Oral Daily Bridget Jolley, NP        naloxone (NARCAN) injection 0.2 mg  0.2 mg Intravenous Q2 Min PRN Flower Baeza PA-C        Or    naloxone (NARCAN) injection 0.4 mg  0.4 mg Intravenous Q2 Min PRN Flower Baeza PA-C        Or    naloxone (NARCAN) injection 0.2 mg  0.2 mg Intramuscular Q2 Min PRN Flower Baeza PA-C        Or    naloxone (NARCAN) injection 0.4 mg  0.4 mg Intramuscular Q2 Min PRN Flower Baeza PA-C        ondansetron (ZOFRAN ODT) ODT tab 4  "mg  4 mg Oral Q6H PRN Flower Baeza PA-C        Or    ondansetron (ZOFRAN) injection 4 mg  4 mg Intravenous Q6H PRN Flower Baeza PA-C   4 mg at 05/14/25 0913    oxyCODONE IR (ROXICODONE) half-tab 2.5 mg  2.5 mg Oral Q4H PRN Flower Baeza PA-C   2.5 mg at 05/14/25 0306    Or    oxyCODONE (ROXICODONE) tablet 5 mg  5 mg Oral Q4H PRN Flower Baeza PA-C   5 mg at 05/12/25 0350    pantoprazole (PROTONIX) 2 mg/mL suspension 40 mg  40 mg Oral or NG Tube Daily Flower Baeza PA-C   40 mg at 05/12/25 0821    Or    pantoprazole (PROTONIX) EC tablet 40 mg  40 mg Oral Daily Flower Baeza PA-C   40 mg at 05/14/25 0858    polyethylene glycol (MIRALAX) Packet 17 g  17 g Oral Daily Flower Baeza PA-C   17 g at 05/13/25 0850    prochlorperazine (COMPAZINE) injection 5 mg  5 mg Intravenous Q6H PRN Flower Baeza PA-C        Or    prochlorperazine (COMPAZINE) tablet 5 mg  5 mg Oral Q6H PRN Flower Baeza PA-C        Reason beta blocker order not selected   Does not apply DOES NOT GO TO MAR Flower Baeza PA-C        rosuvastatin (CRESTOR) tablet 5 mg  5 mg Oral Daily Flower Baeza PA-C   5 mg at 05/14/25 0858    senna-docusate (SENOKOT-S/PERICOLACE) 8.6-50 MG per tablet 2 tablet  2 tablet Oral BID Flower Baeza PA-C   2 tablet at 05/14/25 2034    sodium chloride (PF) 0.9% PF flush 3 mL  3 mL Intracatheter Q8H Flower Baeza PA-C   3 mL at 05/15/25 0606    sodium chloride (PF) 0.9% PF flush 3 mL  3 mL Intracatheter q1 min prn Flower Baeza PA-C        sodium chloride 0.9% BOLUS 1-250 mL  1-250 mL Intravenous Q1H PRN Flower Baeza PA-C         ANTHROPOMETRICS  Height: 1.499 m (4' 11\")  Admission Weight: 57.4 kg (126 lb 8.7 oz) (05/11/25 0600)   Most Recent Weight: 66.4 kg (146 lb 6.2 oz)  IBW: 43 kg, IBW: 133%  Body mass index is 29.57 kg/m . Overweight BMI 25-29.9  Weight History: No significant weight loss noted  Wt Readings from Last 15 Encounters: "   05/15/25 66.4 kg (146 lb 6.2 oz)   05/09/25 60.3 kg (133 lb)   04/18/25 60.6 kg (133 lb 8 oz)   10/09/24 63 kg (139 lb)   10/31/23 63.8 kg (140 lb 9.6 oz)   01/07/19 64.4 kg (142 lb)   12/13/18 65.7 kg (144 lb 12.8 oz)   10/01/18 68 kg (150 lb)   06/21/18 68.9 kg (152 lb)   06/21/17 68.6 kg (151 lb 3.2 oz)   12/16/16 67.7 kg (149 lb 3.2 oz)   04/27/16 65.8 kg (145 lb)   12/16/15 67.7 kg (149 lb 3.2 oz)   04/20/15 67.8 kg (149 lb 6.4 oz)   10/06/14 66.7 kg (147 lb)     ESTIMATED NUTRITION NEEDS    Based on: 46.6 kg (Adjusted Body Weight)    Energy: 5132-8978 kcals/day (25 - 30 kcals/kg of adjusted wt)  Justification: Maintenance  Protein: 56-70 grams protein/day (1.2 - 1.5 grams of pro/kg of adjusted wt)  Justification: Maintenance  Fluid:    1 mL/kcal (1 mL/kcal)  Justification: Maintenance    SYSTEM FINDINGS    Skin/wounds: Reviewed   Negative Pressure Wound Therapy Sternum Upper;Midline (Active)   Wound Type Surgical 05/15/25 0400   Unit Type Wound Vac 05/15/25 0400   Cycle Continuous 05/15/25 0400   Target Pressure (mmHg) 125 05/15/25 0400   Drainage Color/Characteristics Other (Comment) 05/14/25 0800   Output (ml) 0 ml 05/14/25 0800       Incision/Surgical Site 05/11/25 Midline Sternum (Active)   Incision Assessment UTV 05/15/25 0400   Dressing Vacuum based 05/15/25 0400   Sandrine-Incision Assessment Warm 05/15/25 0400   Closure AMRIT 05/15/25 0400   Incision Drainage Amount None 05/15/25 0400   Incision Care Therapy - negative pressure 05/15/25 0400   Dressing Intervention Clean, dry, intact 05/15/25 0400       Incision/Surgical Site 05/11/25 Left;Medial Thigh (Active)   Incision Assessment WDL 05/15/25 0400   Dressing Open to air 05/15/25 0400   Sandrine-Incision Assessment Ecchymosis 05/15/25 0400   Closure Approximated;Liquid bandage 05/15/25 0400   Incision Drainage Amount None 05/15/25 0400   Incision Care Wound cleanser 05/12/25 0400   Dressing Intervention Open to air / No Dressing 05/15/25 0400        Incision/Surgical Site 05/11/25 Left;Medial;Lower Leg (Active)   Incision Assessment WDL 05/15/25 0400   Dressing Open to air 05/15/25 0400   Sandrine-Incision Assessment Ecchymosis 05/15/25 0400   Closure Staples 05/15/25 0400   Incision Drainage Amount None 05/15/25 0400   Incision Care Wound cleanser 05/12/25 0400   Dressing Intervention Open to air / No Dressing 05/15/25 0400     GI symptoms: no concerns; Last BM:  (klever)    MALNUTRITION  % Intake: No decreased intake noted  % Weight Loss: None noted  Subcutaneous Fat Loss: Unable to assess  Muscle Loss: Unable to assess  Fluid Accumulation/Edema: None noted  Malnutrition Diagnosis: Patient does not meet two of the established criteria necessary for diagnosing malnutrition but is at risk for malnutrition  Malnutrition Present on Admission: No    Nutrition Diagnosis  Increased nutrient needs   related to CABG post operative as evidenced by oral nutrition supplement.     Interventions  See nutrition interventions above    Goals  Patient to consume % of nutritionally adequate meal trays TID, or the equivalent with supplements/snacks.     Monitoring/Evaluation  Progress toward goals will be monitored and evaluated per policy.    Jerome Ayers, RD, LD, MS  [facility] Coverage  Available on [employer]

## 2025-05-15 NOTE — PROGRESS NOTES
[facility]  Cardiovascular and Thoracic Surgery Daily Note    Assessment and Plan  Mikayla Sotomayor is a 84 year old female with a PMH of HTN, CKD stage 3, HLD and DM2. Presented to [facility] as NSTEMI and transferred to [facility] with IABP given critical LM stenosis and need for urgent CABG.    POD # 4 s/p coronary artery bypass grafting x2 (LIMA to LAD and SVG to OM) on 5/11/25 with Dr. Roberto Guzman. IABP removed intraoperatively.    - CVS:   Pre-op TTE with LVEF 35%, grade I diastolic dysfunction and preserved RV function. 1-2+ MR, 1-2+ TR  Carotid US demonstrates moderate bilateral carotid stenosis  HD stable overnight in NSR.     Postop vasoplegic shock, resolved  Continue  mg daily, Toprol 50 mg daily, resume PTA hydralazine, continue rosuvastatin 5 mg daily and up titrate as able (new med for patient)  Optimize GDMT as able. CORE consult placed 5/12/25 and note dropped to schedule at discharge with CORE. Will need baseline echo today  Hypervolemic, repeat lasix 20 mg IV x1 today  Chest tubes and TPWs to be removed today - CXR in AM  Wound vac removed today  PTA meds on hold: vasotec, hydrodiuril     - Resp: Postoperative mechanical ventilation. Extubated pod#1, sating well on RA, encourage IS    - Neuro: Delirium, forgetful despite frequent reminds regarding surgery and reason she's in the hospital. Per son, has baseline memory issues. Delirium precautions. Start 12.5 mg seroquel at HS (QTc 446 ms). Pain controlled on current regimen.      - Renal: ZANE on CKD stage 3. BL Cr appears to be ~1.6. Trend BMP. Volume management as above.  Recent Labs   Lab 05/15/25  0838 05/14/25  0536 05/13/25  0430   CR 1.87* 1.94* 2.06*       - GI: + BM, + flatus, continue bowel regimen. Dysphagia    - : Voiding well on own but some retention. PVRs  Addendum: rand replaced due to high residuals    - Endo: Postop stress hyperglycemia. DM2.  Transitioned to sliding scale insulin with goal BG <180  Hemoglobin A1C  "  Date Value Ref Range Status   05/10/2025 6.0 (H) <5.7 % Final     Comment:     Normal <5.7%   Prediabetes 5.7-6.4%    Diabetes 6.5% or higher     Note: Adopted from ADA consensus guidelines.   01/07/2019 6.9 (H) 0 - 5.6 % Final     Comment:     Normal <5.7% Prediabetes 5.7-6.4%  Diabetes 6.5% or higher - adopted from ADA   consensus guidelines.          - FEN: Replace electrolytes as needed. Tolerating diet, on calorie counts per dietician. Current diet per speech:  Orders Placed This Encounter      Easy to Chew Diet (level 7)       - ID: Postop leukocytosis, improving. Afebrile. Periop abx prophylaxis completed. Trend CBC and fever curve.   Recent Labs   Lab 05/15/25  0838 05/14/25  0536 05/13/25  0430   WBC 13.5* 17.0* 19.6*       - Heme: Acute blood loss anemia and thrombocytopenia due to surgery. Hgb and PLT stable. Trend CBC, transfuse PRN. Transfused 1U PRBc 5/13 am  Recent Labs   Lab 05/15/25  0838 05/14/25  0536 05/13/25  0430   HGB 10.8* 10.4* 7.6*    107* 90*       - Proph: SCD, subcutaneous heparin, PPI    - Other:  Clinically Significant Risk Factors               # Hypoalbuminemia: Lowest albumin = 3.3 g/dL at 5/11/2025  9:35 PM, will monitor as appropriate     # Thrombocytopenia: Lowest platelets = 107 in last 2 days, will monitor for bleeding   # Hypertension: Noted on problem list  # Acute heart failure with reduced ejection fraction: last echo with EF <40% and receiving IV diuretics    # Acute Hypoxic Respiratory Failure: Based on blood gas results. Continue supplemental oxygen as needed         # Overweight: Estimated body mass index is 29.57 kg/m  as calculated from the following:    Height as of 5/9/25: 1.499 m (4' 11\").    Weight as of this encounter: 66.4 kg (146 lb 6.2 oz).       # History of CABG: noted on surgical history       - Dispo: Continue cares on [address], continue therapies, up to chair as able. Work with therapies. Encourage OOB for meals and advance diet as able. Therapies " recommending TCU at discharge. Social work will reach out to the family regarding TCU and resources.    Medically Ready for Discharge: 1-2 days pending medical stability      Interval History    No acute events overnight. States pain is well managed on current regimen. Is confused and oriented to self, recognizes family. No short term memory but long term memory appears intact. Breathing is stable on rom air, working with IS. Tolerating diet, is passing flatus, +BM, no n/v. Ambulating in the halls with assistance. Denies chest pain, palpitations, dizziness, syncopal symptoms, chills, myalgias, sternal popping/clicking.      Medications  Current Facility-Administered Medications   Medication Dose Route Frequency Provider Last Rate Last Admin    acetaminophen (TYLENOL) tablet 975 mg  975 mg Oral Q8H Flower Baeza PA-C   975 mg at 05/15/25 0606    aspirin (ASA) chewable tablet 162 mg  162 mg Oral or NG Tube Daily Flower Baeza PA-C   162 mg at 05/15/25 0830    heparin ANTICOAGULANT injection 5,000 Units  5,000 Units Subcutaneous Q12H Flower Baeza PA-C   5,000 Units at 05/15/25 0606    hydrALAZINE (APRESOLINE) tablet 10 mg  10 mg Oral TID Bridget Jolley NP        insulin aspart (NovoLOG) injection (RAPID ACTING)  1-7 Units Subcutaneous TID AC Flower Baeza PA-C   1 Units at 05/14/25 1308    insulin aspart (NovoLOG) injection (RAPID ACTING)  1-5 Units Subcutaneous At Bedtime Flower Baeza PA-C        metoprolol succinate ER (TOPROL XL) 24 hr tablet 50 mg  50 mg Oral Daily Bridget Jolley NP   50 mg at 05/15/25 0830    pantoprazole (PROTONIX) 2 mg/mL suspension 40 mg  40 mg Oral or NG Tube Daily Flower Baeza PA-C   40 mg at 05/12/25 0821    Or    pantoprazole (PROTONIX) EC tablet 40 mg  40 mg Oral Daily Flower Baeza PA-C   40 mg at 05/15/25 0830    polyethylene glycol (MIRALAX) Packet 17 g  17 g Oral Daily Flower Baeza PA-C   17 g at 05/13/25 0850    potassium chloride  (KAYCIEL) solution 10 mEq  10 mEq Oral or Feeding Tube Once Gabe Guzman MD        rosuvastatin (CRESTOR) tablet 5 mg  5 mg Oral Daily Flower Baeza PA-C   5 mg at 05/15/25 0831    senna-docusate (SENOKOT-S/PERICOLACE) 8.6-50 MG per tablet 2 tablet  2 tablet Oral BID Flower Baeza PA-C   2 tablet at 05/15/25 0837    sodium chloride (PF) 0.9% PF flush 3 mL  3 mL Intracatheter Q8H Flower Baeza PA-C   3 mL at 05/15/25 0606     Current Facility-Administered Medications   Medication Dose Route Frequency Provider Last Rate Last Admin    acetaminophen (TYLENOL) tablet 650 mg  650 mg Oral Q4H PRN Flower Baeza PA-C   650 mg at 05/11/25 1357    bisacodyl (DULCOLAX) suppository 10 mg  10 mg Rectal Daily PRN Flower Baeza PA-C        glucose gel 15-30 g  15-30 g Oral Q15 Min PRN Flower Baeza PA-C        Or    dextrose 50 % injection 25-50 mL  25-50 mL Intravenous Q15 Min PRN Flower Baeza PA-C        Or    glucagon injection 1 mg  1 mg Subcutaneous Q15 Min PRN Flower Baeza PA-C        hydrALAZINE (APRESOLINE) injection 10 mg  10 mg Intravenous Q30 Min PRN Flower Baeza PA-C   10 mg at 05/14/25 0609    lactated ringers BOLUS 500 mL  500 mL Intravenous Once PRN Flower Baeza PA-C        lidocaine (LMX4) cream   Topical Q1H PRN Flower Baeza PA-C        lidocaine 1 % 0.1-1 mL  0.1-1 mL Other Q1H PRN Flower Baeza PA-C        magnesium hydroxide (MILK OF MAGNESIA) suspension 30 mL  30 mL Oral Daily PRN Flower Baeza PA-C        melatonin tablet 3 mg  3 mg Oral At Bedtime PRN Flower Baeza PA-C   3 mg at 05/13/25 2223    naloxone (NARCAN) injection 0.2 mg  0.2 mg Intravenous Q2 Min PRN Flower Baeza PA-C        Or    naloxone (NARCAN) injection 0.4 mg  0.4 mg Intravenous Q2 Min PRN Flower Baeza PA-C        Or    naloxone (NARCAN) injection 0.2 mg  0.2 mg Intramuscular Q2 Min PRN Flower Baeza PA-C        Or     naloxone (NARCAN) injection 0.4 mg  0.4 mg Intramuscular Q2 Min PRN Flower Baeza PA-C        ondansetron (ZOFRAN ODT) ODT tab 4 mg  4 mg Oral Q6H PRN Flower Baeza PA-C        Or    ondansetron (ZOFRAN) injection 4 mg  4 mg Intravenous Q6H PRN Flower Baeza PA-C   4 mg at 05/14/25 0913    oxyCODONE IR (ROXICODONE) half-tab 2.5 mg  2.5 mg Oral Q4H PRN Flower Baeza PA-C   2.5 mg at 05/14/25 0306    Or    oxyCODONE (ROXICODONE) tablet 5 mg  5 mg Oral Q4H PRN Flower Baeza PA-C   5 mg at 05/12/25 0350    prochlorperazine (COMPAZINE) injection 5 mg  5 mg Intravenous Q6H PRN Flower Baeza PA-C        Or    prochlorperazine (COMPAZINE) tablet 5 mg  5 mg Oral Q6H PRN Flower Baeza PA-C        Reason beta blocker order not selected   Does not apply DOES NOT GO TO Flower Townsend PA-C        sodium chloride (PF) 0.9% PF flush 3 mL  3 mL Intracatheter q1 min prn Flower Baeza PA-C        sodium chloride 0.9% BOLUS 1-250 mL  1-250 mL Intravenous Q1H PRN Flower Baeza PA-C             Physical Exam  Vitals were reviewed  Blood pressure 133/68, pulse 86, temperature 97.8  F (36.6  C), temperature source Oral, resp. rate 18, weight 66.4 kg (146 lb 6.2 oz), SpO2 98%.    Rhythm: NSR    Lungs: Sating well on RA    Cardiovascular: Distant, S1, S2, RRR, no m/r/g    Abdomen: soft, NT, ND     Extremeties: warm, no LE edema    Incision: CDI.     CT: serosang output, no air leak    Weight:   Vitals:    05/11/25 0600 05/12/25 0515 05/13/25 0559 05/14/25 0306   Weight: 57.4 kg (126 lb 8.7 oz) 53.4 kg (117 lb 11.6 oz) 58.5 kg (128 lb 15.5 oz) 68.2 kg (150 lb 5.7 oz)    05/15/25 0606   Weight: 66.4 kg (146 lb 6.2 oz)         Data  Recent Labs   Lab 05/15/25  0838 05/15/25  0805 05/15/25  0159 05/14/25  0823 05/14/25  0536 05/13/25  0624 05/13/25  0430 05/11/25  2139 05/11/25  2135 05/11/25  1334 05/11/25  1318 05/11/25  1139 05/11/25  1138 05/10/25  1853 05/10/25  1734  05/10/25  0804 05/10/25  0742   WBC 13.5*  --   --   --  17.0*  --  19.6*   < >  --   --  27.2*  --  22.3*   < >  --   --  12.4*   HGB 10.8*  --   --   --  10.4*  --  7.6*   < >  --   --  10.1*   < > 8.1*   < >  --   --  12.9   MCV 90  --   --   --  87  --  91   < >  --   --  94  --  94   < >  --   --  92     --   --   --  107*  --  90*   < >  --   --  156  --  121*   < >  --   --  240   INR  --   --   --   --   --   --   --   --   --   --  1.35*  --  1.47*  --  1.06  --   --      --   --   --  138  --  139   < > 144  --  138   < > 140   < > 140  --  138   POTASSIUM 3.5  --   --   --  4.0  --  4.1   < > 3.6  --  4.2   < > 4.4   < > 4.4  --  4.0   CHLORIDE 102  --   --   --  104  --  104   < > 108*  --  103  --  106   < > 102  --  101   CO2 24  --   --   --  21*  --  23   < > 25  --  15*  --  20*   < > 22  --  25   BUN 47.0*  --   --   --  45.7*  --  37.6*   < > 27.9*  --  27.1*  --  28.1*   < > 28.6*  --  31.0*   CR 1.87*  --   --   --  1.94*  --  2.06*   < > 1.83*  --  1.61*  --  1.67*   < > 1.66*  --  1.68*   ANIONGAP 12  --   --   --  13  --  12   < > 11  --  20*  --  14   < > 16*  --  12   LISA 8.6*  --   --   --  8.5*  --  8.2*   < > 9.0  --  9.0  --  9.0   < > 9.7  --  9.6   * 121* 115*   < > 139*   < > 88   < > 142*   < > 215*   < > 152*   < > 98   < > 136*   ALBUMIN  --   --   --   --   --   --   --   --  3.3*  --  3.4*  --   --   --   --   --  3.9   PROTTOTAL  --   --   --   --   --   --   --   --  5.3*  --  5.4*  --   --   --   --   --  6.8   BILITOTAL  --   --   --   --   --   --   --   --  0.2  --  0.4  --   --   --   --   --  0.3   ALKPHOS  --   --   --   --   --   --   --   --  42  --  43  --   --   --   --   --  60   ALT  --   --   --   --   --   --   --   --  11  --  16  --   --   --   --   --  22   AST  --   --   --   --   --   --   --   --  122*  --   --   --   --   --   --   --  393*    < > = values in this interval not displayed.       Imaging:  No results found for this or any  previous visit (from the past 24 hours).        Patient seen and discussed with KOMAL Cagle, ACN-AG, CCRN  Nurse Practitioner  Cardiothoracic Surgery  Pager: [phone]    CV Surgery Rounding Pager: [phone]  After hours please page surgeon on-call  ]

## 2025-05-16 ENCOUNTER — APPOINTMENT (OUTPATIENT)
Dept: GENERAL RADIOLOGY | Facility: CLINIC | Age: 85
DRG: 233 | End: 2025-05-16
Attending: NURSE PRACTITIONER
Payer: COMMERCIAL

## 2025-05-16 ENCOUNTER — APPOINTMENT (OUTPATIENT)
Dept: OCCUPATIONAL THERAPY | Facility: CLINIC | Age: 85
DRG: 233 | End: 2025-05-16
Payer: COMMERCIAL

## 2025-05-16 LAB
ALBUMIN UR-MCNC: NEGATIVE MG/DL
ANION GAP SERPL CALCULATED.3IONS-SCNC: 10 MMOL/L (ref 7–15)
APPEARANCE UR: CLEAR
BACTERIA #/AREA URNS HPF: ABNORMAL /HPF
BILIRUB UR QL STRIP: NEGATIVE
BUN SERPL-MCNC: 40.1 MG/DL (ref 8–23)
CALCIUM SERPL-MCNC: 8.2 MG/DL (ref 8.8–10.4)
CHLORIDE SERPL-SCNC: 105 MMOL/L (ref 98–107)
COLOR UR AUTO: ABNORMAL
CREAT SERPL-MCNC: 1.61 MG/DL (ref 0.51–0.95)
EGFRCR SERPLBLD CKD-EPI 2021: 31 ML/MIN/1.73M2
ERYTHROCYTE [DISTWIDTH] IN BLOOD BY AUTOMATED COUNT: 14.8 % (ref 10–15)
GLUCOSE SERPL-MCNC: 148 MG/DL (ref 70–99)
GLUCOSE UR STRIP-MCNC: NEGATIVE MG/DL
HCO3 SERPL-SCNC: 24 MMOL/L (ref 22–29)
HCT VFR BLD AUTO: 29.7 % (ref 35–47)
HGB BLD-MCNC: 10.1 G/DL (ref 11.7–15.7)
HGB UR QL STRIP: NEGATIVE
KETONES UR STRIP-MCNC: NEGATIVE MG/DL
LEUKOCYTE ESTERASE UR QL STRIP: NEGATIVE
MAGNESIUM SERPL-MCNC: 2.1 MG/DL (ref 1.7–2.3)
MCH RBC QN AUTO: 29.8 PG (ref 26.5–33)
MCHC RBC AUTO-ENTMCNC: 34 G/DL (ref 31.5–36.5)
MCV RBC AUTO: 88 FL (ref 78–100)
MUCOUS THREADS #/AREA URNS LPF: PRESENT /LPF
NITRATE UR QL: NEGATIVE
PH UR STRIP: 5.5 [PH] (ref 5–7)
PHOSPHATE SERPL-MCNC: 2.4 MG/DL (ref 2.5–4.5)
PLATELET # BLD AUTO: 178 10E3/UL (ref 150–450)
POTASSIUM SERPL-SCNC: 3.3 MMOL/L (ref 3.4–5.3)
POTASSIUM SERPL-SCNC: 3.8 MMOL/L (ref 3.4–5.3)
RBC # BLD AUTO: 3.39 10E6/UL (ref 3.8–5.2)
RBC URINE: 2 /HPF
SODIUM SERPL-SCNC: 139 MMOL/L (ref 135–145)
SP GR UR STRIP: 1.02 (ref 1–1.03)
SQUAMOUS EPITHELIAL: <1 /HPF
UROBILINOGEN UR STRIP-MCNC: NORMAL MG/DL
WBC # BLD AUTO: 11.8 10E3/UL (ref 4–11)
WBC URINE: 2 /HPF

## 2025-05-16 PROCEDURE — 71046 X-RAY EXAM CHEST 2 VIEWS: CPT

## 2025-05-16 PROCEDURE — 250N000013 HC RX MED GY IP 250 OP 250 PS 637: Performed by: NURSE PRACTITIONER

## 2025-05-16 PROCEDURE — 85014 HEMATOCRIT: CPT | Performed by: PHYSICIAN ASSISTANT

## 2025-05-16 PROCEDURE — 84132 ASSAY OF SERUM POTASSIUM: CPT | Performed by: STUDENT IN AN ORGANIZED HEALTH CARE EDUCATION/TRAINING PROGRAM

## 2025-05-16 PROCEDURE — 250N000011 HC RX IP 250 OP 636: Performed by: NURSE PRACTITIONER

## 2025-05-16 PROCEDURE — 80048 BASIC METABOLIC PNL TOTAL CA: CPT | Performed by: PHYSICIAN ASSISTANT

## 2025-05-16 PROCEDURE — 120N000001 HC R&B MED SURG/OB

## 2025-05-16 PROCEDURE — 250N000013 HC RX MED GY IP 250 OP 250 PS 637: Performed by: PHYSICIAN ASSISTANT

## 2025-05-16 PROCEDURE — 250N000013 HC RX MED GY IP 250 OP 250 PS 637: Performed by: STUDENT IN AN ORGANIZED HEALTH CARE EDUCATION/TRAINING PROGRAM

## 2025-05-16 PROCEDURE — 85018 HEMOGLOBIN: CPT | Performed by: PHYSICIAN ASSISTANT

## 2025-05-16 PROCEDURE — 97535 SELF CARE MNGMENT TRAINING: CPT | Mod: GO

## 2025-05-16 PROCEDURE — 250N000011 HC RX IP 250 OP 636: Performed by: PHYSICIAN ASSISTANT

## 2025-05-16 PROCEDURE — 36415 COLL VENOUS BLD VENIPUNCTURE: CPT | Performed by: STUDENT IN AN ORGANIZED HEALTH CARE EDUCATION/TRAINING PROGRAM

## 2025-05-16 PROCEDURE — 81003 URINALYSIS AUTO W/O SCOPE: CPT | Performed by: NURSE PRACTITIONER

## 2025-05-16 PROCEDURE — 83735 ASSAY OF MAGNESIUM: CPT | Performed by: PHYSICIAN ASSISTANT

## 2025-05-16 PROCEDURE — 36415 COLL VENOUS BLD VENIPUNCTURE: CPT | Performed by: PHYSICIAN ASSISTANT

## 2025-05-16 PROCEDURE — 84100 ASSAY OF PHOSPHORUS: CPT | Performed by: PHYSICIAN ASSISTANT

## 2025-05-16 RX ORDER — POTASSIUM CHLORIDE 1500 MG/1
20 TABLET, EXTENDED RELEASE ORAL ONCE
Status: COMPLETED | OUTPATIENT
Start: 2025-05-16 | End: 2025-05-16

## 2025-05-16 RX ORDER — POTASSIUM CHLORIDE 20MEQ/15ML
10 LIQUID (ML) ORAL ONCE
Status: COMPLETED | OUTPATIENT
Start: 2025-05-16 | End: 2025-05-16

## 2025-05-16 RX ORDER — FUROSEMIDE 10 MG/ML
20 INJECTION INTRAMUSCULAR; INTRAVENOUS ONCE
Status: COMPLETED | OUTPATIENT
Start: 2025-05-16 | End: 2025-05-16

## 2025-05-16 RX ADMIN — POTASSIUM & SODIUM PHOSPHATES POWDER PACK 280-160-250 MG 1 PACKET: 280-160-250 PACK at 11:53

## 2025-05-16 RX ADMIN — ACETAMINOPHEN 975 MG: 325 TABLET, FILM COATED ORAL at 05:42

## 2025-05-16 RX ADMIN — SENNOSIDES AND DOCUSATE SODIUM 2 TABLET: 50; 8.6 TABLET ORAL at 08:52

## 2025-05-16 RX ADMIN — METOPROLOL SUCCINATE 50 MG: 50 TABLET, FILM COATED, EXTENDED RELEASE ORAL at 08:52

## 2025-05-16 RX ADMIN — QUETIAPINE 12.5 MG: 25 TABLET, FILM COATED ORAL at 21:39

## 2025-05-16 RX ADMIN — POLYETHYLENE GLYCOL 3350 17 G: 17 POWDER, FOR SOLUTION ORAL at 08:53

## 2025-05-16 RX ADMIN — ACETAMINOPHEN 975 MG: 325 TABLET, FILM COATED ORAL at 15:37

## 2025-05-16 RX ADMIN — HEPARIN SODIUM 5000 UNITS: 5000 INJECTION, SOLUTION INTRAVENOUS; SUBCUTANEOUS at 18:46

## 2025-05-16 RX ADMIN — PANTOPRAZOLE SODIUM 40 MG: 40 TABLET, DELAYED RELEASE ORAL at 08:52

## 2025-05-16 RX ADMIN — POTASSIUM & SODIUM PHOSPHATES POWDER PACK 280-160-250 MG 1 PACKET: 280-160-250 PACK at 08:53

## 2025-05-16 RX ADMIN — HEPARIN SODIUM 5000 UNITS: 5000 INJECTION, SOLUTION INTRAVENOUS; SUBCUTANEOUS at 05:42

## 2025-05-16 RX ADMIN — ROSUVASTATIN CALCIUM 5 MG: 5 TABLET, FILM COATED ORAL at 08:52

## 2025-05-16 RX ADMIN — HYDRALAZINE HYDROCHLORIDE 10 MG: 10 TABLET ORAL at 15:37

## 2025-05-16 RX ADMIN — ASPIRIN 81 MG CHEWABLE TABLET 162 MG: 81 TABLET CHEWABLE at 08:52

## 2025-05-16 RX ADMIN — HYDRALAZINE HYDROCHLORIDE 10 MG: 10 TABLET ORAL at 21:40

## 2025-05-16 RX ADMIN — ACETAMINOPHEN 975 MG: 325 TABLET, FILM COATED ORAL at 21:39

## 2025-05-16 RX ADMIN — HYDRALAZINE HYDROCHLORIDE 10 MG: 10 TABLET ORAL at 08:52

## 2025-05-16 RX ADMIN — POTASSIUM CHLORIDE 10 MEQ: 1.5 SOLUTION ORAL at 18:46

## 2025-05-16 RX ADMIN — FUROSEMIDE 20 MG: 10 INJECTION, SOLUTION INTRAMUSCULAR; INTRAVENOUS at 06:39

## 2025-05-16 RX ADMIN — POTASSIUM CHLORIDE 20 MEQ: 1500 TABLET, EXTENDED RELEASE ORAL at 08:52

## 2025-05-16 ASSESSMENT — ACTIVITIES OF DAILY LIVING (ADL)
ADLS_ACUITY_SCORE: 51
ADLS_ACUITY_SCORE: 57
ADLS_ACUITY_SCORE: 57
ADLS_ACUITY_SCORE: 51
ADLS_ACUITY_SCORE: 57
ADLS_ACUITY_SCORE: 51
ADLS_ACUITY_SCORE: 57
ADLS_ACUITY_SCORE: 51
ADLS_ACUITY_SCORE: 57
ADLS_ACUITY_SCORE: 57
ADLS_ACUITY_SCORE: 51
ADLS_ACUITY_SCORE: 51
ADLS_ACUITY_SCORE: 57
ADLS_ACUITY_SCORE: 57
ADLS_ACUITY_SCORE: 51
ADLS_ACUITY_SCORE: 57
ADLS_ACUITY_SCORE: 51
ADLS_ACUITY_SCORE: 57

## 2025-05-16 NOTE — PROGRESS NOTES
[facility]  Cardiovascular and Thoracic Surgery Daily Note    Assessment and Plan  Mikayla Sotomayor is a 84 year old female with a PMH of HTN, CKD stage 3, HLD and DM2. Presented to [facility] as NSTEMI and transferred to [facility] with IABP given critical LM stenosis and need for urgent CABG.    POD # 5 s/p coronary artery bypass grafting x2 (LIMA to LAD and SVG to OM) on 5/11/25 with Dr. Roberto Guzman. IABP removed intraoperatively.    - CVS:   Pre-op TTE with LVEF 35%, grade I diastolic dysfunction and preserved RV function. 1-2+ MR, 1-2+ TR  Carotid US demonstrates moderate bilateral carotid stenosis  HD stable overnight in NSR.     Postop vasoplegic shock, resolved  Continue  mg daily, Toprol 50 mg daily, resume PTA hydralazine, continue rosuvastatin 5 mg daily and up titrate as able (new med for patient)  Optimize GDMT as able. CORE consult placed 5/12/25 and note dropped to schedule at discharge with CORE. Will need baseline echo today  Hypervolemic, repeat lasix 20 mg IV x1 today  Chest tubes and TPWs removed POD4  Wound vac removed POD4  PTA meds on hold: vasotec, hydrodiuril     - Resp: Postoperative mechanical ventilation. Extubated pod#1, sating well on RA, encourage IS, pulm hygiene    - Neuro: Delirium, forgetful despite frequent reminds regarding surgery and reason she's in the hospital. Per son, has baseline memory issues. Delirium precautions. Continue 12.5 mg seroquel at HS (QTc 446 ms). Pain controlled on current regimen.      - Renal: ZANE (resolved) on CKD stage 3. BL Cr appears to be ~1.6. Trend BMP. Volume management as above.  Recent Labs   Lab 05/16/25  0641 05/15/25  0838 05/14/25  0536   CR 1.61* 1.87* 1.94*       - GI: + BM, + flatus, continue bowel regimen. Dysphagia resolved    - : Voiding well on own but some retention. PVRs  Navarro replaced due to high residuals. Obtain UA 5/16    - Endo: Postop stress hyperglycemia. DM2.  Transitioned to sliding scale insulin with goal BG  "<180  Hemoglobin A1C   Date Value Ref Range Status   05/10/2025 6.0 (H) <5.7 % Final     Comment:     Normal <5.7%   Prediabetes 5.7-6.4%    Diabetes 6.5% or higher     Note: Adopted from ADA consensus guidelines.   01/07/2019 6.9 (H) 0 - 5.6 % Final     Comment:     Normal <5.7% Prediabetes 5.7-6.4%  Diabetes 6.5% or higher - adopted from ADA   consensus guidelines.          - FEN: Replace electrolytes as needed. Tolerating diet, on calorie counts per dietician. Current diet per speech:  Orders Placed This Encounter      Combination Diet Regular Diet; Thin Liquids (level 0)       - ID: Postop leukocytosis, improving. Afebrile. Periop abx prophylaxis completed. Trend CBC and fever curve.   Recent Labs   Lab 05/16/25  0641 05/15/25  0838 05/14/25  0536   WBC 11.8* 13.5* 17.0*       - Heme: Acute blood loss anemia and thrombocytopenia due to surgery. Hgb and PLT stable. Trend CBC, transfuse PRN. Transfused 1U PRBc 5/13 am  Recent Labs   Lab 05/16/25  0641 05/15/25  0838 05/14/25  0536   HGB 10.1* 10.8* 10.4*    170 107*       - Proph: SCD, subcutaneous heparin, PPI    - Other:  Clinically Significant Risk Factors        # Hypokalemia: Lowest K = 3.3 mmol/L in last 2 days, will replace as needed        # Hypoalbuminemia: Lowest albumin = 3.3 g/dL at 5/11/2025  9:35 PM, will monitor as appropriate       # Hypertension: Noted on problem list     # Acute Hypoxic Respiratory Failure: Based on blood gas results. Continue supplemental oxygen as needed         # Overweight: Estimated body mass index is 29.12 kg/m  as calculated from the following:    Height as of 5/9/25: 1.499 m (4' 11\").    Weight as of this encounter: 65.4 kg (144 lb 2.9 oz).       # History of CABG: noted on surgical history       - Dispo: Continue cares on [address], continue therapies, up to chair as able. Work with therapies. Encourage OOB for meals and advance diet as able. Therapies recommending TCU at discharge. Social work will reach out to the " family regarding TCU and resources.    Medically Ready for Discharge: today vs tomorrow pending TCU bed availability       Interval History  No acute events overnight. States pain is well managed on current regimen, slept well per report. Breathing is stable on room air, working with IS. Tolerating diet, is passing flatus, +BM, no n/v. Up OOB with heavy assistance. Denies chest pain, palpitations, dizziness, syncopal symptoms, chills, myalgias, sternal popping/clicking.      Medications  Current Facility-Administered Medications   Medication Dose Route Frequency Provider Last Rate Last Admin    acetaminophen (TYLENOL) tablet 975 mg  975 mg Oral Q8H Flower Baeza PA-C   975 mg at 05/16/25 0542    aspirin (ASA) chewable tablet 162 mg  162 mg Oral or NG Tube Daily Flower Baeza PA-C   162 mg at 05/16/25 0852    heparin ANTICOAGULANT injection 5,000 Units  5,000 Units Subcutaneous Q12H Flower Baeza PA-C   5,000 Units at 05/16/25 0542    hydrALAZINE (APRESOLINE) tablet 10 mg  10 mg Oral TID Bridget Jolley NP   10 mg at 05/16/25 0852    metoprolol succinate ER (TOPROL XL) 24 hr tablet 50 mg  50 mg Oral Daily Bridget Jolley NP   50 mg at 05/16/25 0852    pantoprazole (PROTONIX) 2 mg/mL suspension 40 mg  40 mg Oral or NG Tube Daily Flower Baeza PA-C   40 mg at 05/12/25 0821    Or    pantoprazole (PROTONIX) EC tablet 40 mg  40 mg Oral Daily Flower Baeza PA-C   40 mg at 05/16/25 0852    polyethylene glycol (MIRALAX) Packet 17 g  17 g Oral Daily Flower Baeza PA-C   17 g at 05/16/25 0853    potassium & sodium phosphates (NEUTRA-PHOS) Packet 1 packet  1 packet Oral or Feeding Tube Q4H Gabe Guzman MD   1 packet at 05/16/25 0853    QUEtiapine (SEROquel) half-tab 12.5 mg  12.5 mg Oral At Bedtime Bridget Jolley NP   12.5 mg at 05/15/25 2117    rosuvastatin (CRESTOR) tablet 5 mg  5 mg Oral Daily Flower Baeza PA-C   5 mg at 05/16/25 0807    senna-docusate  (SENOKOT-S/PERICOLACE) 8.6-50 MG per tablet 2 tablet  2 tablet Oral BID Flower Baeza PA-C   2 tablet at 05/16/25 0852    sodium chloride (PF) 0.9% PF flush 3 mL  3 mL Intracatheter Q8H Flower Baeza PA-C   3 mL at 05/16/25 0543     Current Facility-Administered Medications   Medication Dose Route Frequency Provider Last Rate Last Admin    acetaminophen (TYLENOL) tablet 650 mg  650 mg Oral Q4H PRN Flower Baeza PA-C   650 mg at 05/11/25 1357    bisacodyl (DULCOLAX) suppository 10 mg  10 mg Rectal Daily PRN Flower Baeza PA-C        glucose gel 15-30 g  15-30 g Oral Q15 Min PRN Flower Baeza PA-C        Or    dextrose 50 % injection 25-50 mL  25-50 mL Intravenous Q15 Min PRN Flower Baeza PA-C        Or    glucagon injection 1 mg  1 mg Subcutaneous Q15 Min PRN Flower Baeza PA-C        hydrALAZINE (APRESOLINE) injection 10 mg  10 mg Intravenous Q30 Min PRN Flower Baeza PA-C   10 mg at 05/14/25 0609    lactated ringers BOLUS 500 mL  500 mL Intravenous Once PRN Flower Baeza PA-C        lidocaine (LMX4) cream   Topical Q1H PRN Flower Baeza PA-C        lidocaine 1 % 0.1-1 mL  0.1-1 mL Other Q1H PRN Flower Baeza PA-C        magnesium hydroxide (MILK OF MAGNESIA) suspension 30 mL  30 mL Oral Daily PRN Flower Baeza PA-C        naloxone (NARCAN) injection 0.2 mg  0.2 mg Intravenous Q2 Min PRN Flower Baeza PA-C        Or    naloxone (NARCAN) injection 0.4 mg  0.4 mg Intravenous Q2 Min PRN Flower Baeza PA-C        Or    naloxone (NARCAN) injection 0.2 mg  0.2 mg Intramuscular Q2 Min PRN Flower Baeza PA-C        Or    naloxone (NARCAN) injection 0.4 mg  0.4 mg Intramuscular Q2 Min PRN Flower Baeza PA-C        ondansetron (ZOFRAN ODT) ODT tab 4 mg  4 mg Oral Q6H PRN Flower Baeza PA-C        Or    ondansetron (ZOFRAN) injection 4 mg  4 mg Intravenous Q6H PRN Flower Baeza PA-C   4 mg at 05/14/25 0913    sodium  chloride (PF) 0.9% PF flush 3 mL  3 mL Intracatheter q1 min prn Flower Baeza PA-C        sodium chloride 0.9% BOLUS 1-250 mL  1-250 mL Intravenous Q1H PRN Flower Baeza PA-C             Physical Exam  Vitals were reviewed  Blood pressure 113/51, pulse 61, temperature 98.1  F (36.7  C), temperature source Oral, resp. rate 16, weight 65.4 kg (144 lb 2.9 oz), SpO2 95%.    Rhythm: NSR    Lungs: Sating well on RA    Cardiovascular: Distant, S1, S2, RRR, no m/r/g    Abdomen: soft, NT, ND     Extremeties: warm, mild LBE edema    Incision: CDI.     CT: N/A    Weight:   Vitals:    05/12/25 0515 05/13/25 0559 05/14/25 0306 05/15/25 0606   Weight: 53.4 kg (117 lb 11.6 oz) 58.5 kg (128 lb 15.5 oz) 68.2 kg (150 lb 5.7 oz) 66.4 kg (146 lb 6.2 oz)    05/16/25 0436   Weight: 65.4 kg (144 lb 2.9 oz)         Data  Recent Labs   Lab 05/16/25  0641 05/15/25  1723 05/15/25  1207 05/15/25  0838 05/14/25  0823 05/14/25  0536 05/11/25  2139 05/11/25  2135 05/11/25  1334 05/11/25  1318 05/11/25  1139 05/11/25  1138 05/10/25  1853 05/10/25  1739 05/10/25  0804 05/10/25  0742   WBC 11.8*  --   --  13.5*  --  17.0*   < >  --   --  27.2*  --  22.3*   < >  --   --  12.4*   HGB 10.1*  --   --  10.8*  --  10.4*   < >  --   --  10.1*   < > 8.1*   < >  --   --  12.9   MCV 88  --   --  90  --  87   < >  --   --  94  --  94   < >  --   --  92     --   --  170  --  107*   < >  --   --  156  --  121*   < >  --   --  240   INR  --   --   --   --   --   --   --   --   --  1.35*  --  1.47*  --  1.06  --   --      --   --  138  --  138   < > 144  --  138   < > 140   < > 140  --  138   POTASSIUM 3.3*  --   --  3.5  --  4.0   < > 3.6  --  4.2   < > 4.4   < > 4.4  --  4.0   CHLORIDE 105  --   --  102  --  104   < > 108*  --  103  --  106   < > 102  --  101   CO2 24  --   --  24  --  21*   < > 25  --  15*  --  20*   < > 22  --  25   BUN 40.1*  --   --  47.0*  --  45.7*   < > 27.9*  --  27.1*  --  28.1*   < > 28.6*  --  31.0*   CR 1.61*   --   --  1.87*  --  1.94*   < > 1.83*  --  1.61*  --  1.67*   < > 1.66*  --  1.68*   ANIONGAP 10  --   --  12  --  13   < > 11  --  20*  --  14   < > 16*  --  12   LISA 8.2*  --   --  8.6*  --  8.5*   < > 9.0  --  9.0  --  9.0   < > 9.7  --  9.6   * 138* 186* 116*   < > 139*   < > 142*   < > 215*   < > 152*   < > 98   < > 136*   ALBUMIN  --   --   --   --   --   --   --  3.3*  --  3.4*  --   --   --   --   --  3.9   PROTTOTAL  --   --   --   --   --   --   --  5.3*  --  5.4*  --   --   --   --   --  6.8   BILITOTAL  --   --   --   --   --   --   --  0.2  --  0.4  --   --   --   --   --  0.3   ALKPHOS  --   --   --   --   --   --   --  42  --  43  --   --   --   --   --  60   ALT  --   --   --   --   --   --   --  11  --  16  --   --   --   --   --  22   AST  --   --   --   --   --   --   --  122*  --   --   --   --   --   --   --  393*    < > = values in this interval not displayed.       Imaging:  Recent Results (from the past 24 hours)   Echocardiogram Limited   Result Value    LVEF  55-60%    [facility]    [id number]  [facility]  [id number]  [id number]^SHEN^CHRISTIAN^OLEGARIO     [facility]  Echocardiography Laboratory  [address] [place] [id number]     Name: PATRICIA WEAVER  MRN: [id number]  : 1940  Study Date: 05/15/2025 03:38 PM  Age: 84 yrs  Gender: Female  Patient Location: [facility]  Reason For Study: CABG, Heart Failure - Left  Ordering Physician: CHRISTIAN GOTTI  Referring Physician: Paige Higginbotham  Performed By: Kushal Piedra     BSA: 1.6 m2  Height: 59 in  Weight: 146 lb  HR: 75  BP: 152/69 mmHg  ______________________________________________________________________________  Procedure  Limited Echocardiogram with two-dimensional, color and spectral Doppler.  ______________________________________________________________________________  Interpretation Summary     Left ventricular systolic function is normal.  There is mild anterior, septal, and apical wall hypokinesis.  The visual  ejection fraction is 55-60%.  The left ventricle is normal in size.  There is moderate concentric left ventricular hypertrophy.  The right ventricle is normal in structure, function and size.  The left atrium is moderate to severely dilated.  Calcified mitral apparatus.  There is mild (1+) mitral regurgitation.  Right ventricular systolic pressure is elevated, consistent with severe  pulmonary hypertension.     As compared with the preoperative TTE 5/10/2025 there has been marked  improvement in the severity and degree of anteroseptal/apical regional wall  motion abnormality and normalization of estimated global LV systolic  performance     ______________________________________________________________________________  Left Ventricle  The left ventricle is normal in size. There is moderate concentric left  ventricular hypertrophy. Left ventricular systolic function is normal. The  visual ejection fraction is 55-60%. Septal motion is consistent with post-  operative state. There is mild anterior, septal, and apical wall hypokinesis.  There is no thrombus seen in the left ventricle.     Right Ventricle  The right ventricle is normal in structure, function and size. There is no  mass or thrombus in the right ventricle.     Atria  The left atrium is moderate to severely dilated. Right atrial size is normal.  There is no atrial shunt seen. The left atrial appendage is not well  visualized.     Mitral Valve  Calcified mitral apparatus. There is mild (1+) mitral regurgitation. There is  no mitral valve stenosis.     Tricuspid Valve  Normal tricuspid valve. There is moderate (2+) tricuspid regurgitation. Right  ventricular systolic pressure is elevated, consistent with severe pulmonary  hypertension. The right ventricular systolic pressure is elevated at 72.2  mmHg.     Aortic Valve  There is mild trileaflet aortic sclerosis. No aortic regurgitation is present.  No aortic stenosis is present.     Pulmonic Valve  Normal  pulmonic valve. There is no pulmonic valvular regurgitation. There is  no pulmonic valvular stenosis.     Vessels  The aortic root is normal size. Normal size ascending aorta. Inferior vena  cava not well visualized for estimation of right atrial pressure.     Pericardium  The pericardium appears normal. There is no pleural effusion.     ______________________________________________________________________________  MMode/2D Measurements & Calculations  IVSd: 1.4 cm  IVSs: 2.8 cm  LVIDd: 4.0 cm  LVPWd: 1.3 cm  LV mass(C)d: 203.6 grams  LV mass(C)dI: 126.2 grams/m2  LA dimension: 4.1 cm     asc Aorta Diam: 3.3 cm  LVOT diam: 2.0 cm  LVOT area: 3.0 cm2  Asc Ao diam index BSA (cm/m2): 2.0  Asc Ao diam index Ht(cm/m): 2.2  RWT: 0.66     Doppler Measurements & Calculations  LV V1 max PG: 3.5 mmHg  LV V1 max: 93.0 cm/sec  LV V1 VTI: 18.8 cm  SV(LVOT): 57.4 ml  SI(LVOT): 35.6 ml/m2  TR max lanette: 424.9 cm/sec  TR max P.2 mmHg     ______________________________________________________________________________  Report approved by: Dr. Tres Mckeon on 05/15/2025 04:24 PM                 Patient seen and discussed with Dr. Mulvihill Leah Jensen, APRN, ACNPC-AG, CCRN  Nurse Practitioner  Cardiothoracic Surgery  Pager: [phone]    CV Surgery Rounding Pager: [phone]  After hours please page surgeon on-call  ]

## 2025-05-16 NOTE — PLAN OF CARE
"Goal Outcome Evaluation:      Plan of Care Reviewed With: patient    Overall Patient Progress: no changeOverall Patient Progress: no change    Patient Name: Juana  MRN: [id number]  Date of Admission: 5/10/2025  Reason for Admission: NSTEMI,S/P CABG x2  Level of Care: [facility]    Vitals:   BP Readings from Last 1 Encounters:   05/16/25 136/64     Pulse Readings from Last 1 Encounters:   05/16/25 78     Wt Readings from Last 1 Encounters:   05/16/25 65.4 kg (144 lb 2.9 oz)     Ht Readings from Last 1 Encounters:   05/09/25 1.499 m (4' 11\")     Estimated body mass index is 29.12 kg/m  as calculated from the following:    Height as of 5/9/25: 1.499 m (4' 11\").    Weight as of this encounter: 65.4 kg (144 lb 2.9 oz).  Temp Readings from Last 1 Encounters:   05/16/25 98.1  F (36.7  C) (Oral)       Pain: Pain goal: 0 Pain Rating: Denies  Effective pain medication/regimen: Scheduled Tylenol    Assessment:    POD# 5 S/P CABG x 2    Resp: Room air  Telemetry: SR with occ's PVC  VS: Vital signs stable,Afebrile  Neuro: Alert to self,oriented to name and Birth date,sometime able to tell which place.Intermittent confusion.  Diet:Regular with Thin liquids,Crashed pills with apple sauce,Calorie counting x 3 days  GI/: With BM on this shift,soft yellowish stool.Bladder scan done with above 300 ml PVR,Navarro  catheter re inserted as verbalized by Provider.  Skin/Wounds: Sternal incision JUDAH,Old CT sites covered. Right groin site. LLE harvest sites. Left knee site has staples. Scattered bruising.   Lines/Drains:Left  PIV SL  Activity: Ax2 lift. T/R   Sleep: Fair  Abnormal Labs: On K+,Mg,Phos protocol,Urine sample sent for Urine culture.    Aggression Stop Light: Green          Patient Care Plan: TCU at discharge, SW following for placement.         "

## 2025-05-16 NOTE — PROGRESS NOTES
Care Management Follow Up    Length of Stay (days): 6    Expected Discharge Date: 05/18/2025     Concerns to be Addressed: discharge planning     Patient plan of care discussed at interdisciplinary rounds: Yes    Anticipated Discharge Disposition: Transitional Care       Anticipated Discharge Services:    Anticipated Discharge DME:      Patient/family educated on Medicare website which has current facility and service quality ratings: no  Education Provided on the Discharge Plan: Yes  Patient/Family in Agreement with the Plan: yes    Referrals Placed by CM/SW: Post Acute Facilities  Private pay costs discussed: transportation costs    Discussed  Partnership in Safe Discharge Planning  document with patient/family: No     Handoff Completed: No, handoff not indicated or clinically appropriate    Additional Information:  [facility]. SW updated pt, daughter Melissa and spouse. All are in agreement. Explained SW can arrange transportation to TCU and if pt able to transport by wheelchair, she will be billed for the cost of the ride. Prior-auth process started with [employer], #[id number]. Clinicals faxed.     Next Steps: Secure prior-auth. Coordinate discharge to TCU, when ready. SW following for discharge planning.     SAMMY Alonso, LICSW  [phone] Desk phone  [phone] Cell/text (Preferred)  [facility]    Available on [employer]

## 2025-05-16 NOTE — PLAN OF CARE
Goal Outcome Evaluation: [id number] 5/16/25    VS: VSS on RA  Neuro: A&Ox3. Disoriented to situation, very forgetful.   GI/: rand patent and with adequate output.  Diet: Regular, pills crushed in applesauce   Mood: down in spirits today, does not want to be in hospital. Inappropriately fixated on leaving, although cannot move. Fear of , says he does not love her. Son in room reorienting.   Skin/Wounds: Sternal incision JUDAH. Old CT sites covered. Right groin site. LLE harvest sites. Left knee site has staples. Scattered bruising.   Lines/Drains: PIV SL  Activity: Ax2 lift. Q2h T/R in bed. 2 chair x2.   Sleep: good  Abnormal Labs: Replaced K and phos, recheck K at 1230, phos recheck in am.  Plan: TCU at discharge.     Acute level of care as of today, was on [facility].     Update at 1044: Heading down for CXR now with RN on cardiac monitor

## 2025-05-16 NOTE — PROGRESS NOTES
CALORIE COUNT      Approximate Oral Intake for:    5/15  Calories: 800  Protein: 30 g pro      Estimated Needs:    46.6 kg (Adjusted Body Weight)  Energy: 2064-1418 kcals/day (25 - 30 kcals/kg of adjusted wt)  Justification: Maintenance  Protein: 56-70 grams protein/day (1.2 - 1.5 grams of pro/kg of adjusted wt)  Justification: Maintenance      Summary:     - meets 69% estimated energy needs,  51% estimated protein needs in 3 meals and 2 supplements.     Calorie counts continue through 5/17.

## 2025-05-16 NOTE — PLAN OF CARE
"Goal Outcome Evaluation:  Patient Name: Juana  MRN: [id number]  Date of Admission: 5/10/2025  Reason for Admission: CABGx2  Level of Care: Med/surg    Vitals:   BP Readings from Last 1 Encounters:  05/16/25 : (!) 141/64    Pulse Readings from Last 1 Encounters:  05/16/25 : 82    Wt Readings from Last 1 Encounters:  05/16/25 : 65.4 kg (144 lb 2.9 oz)    Ht Readings from Last 1 Encounters:  05/09/25 : 1.499 m (4' 11\")    Estimated body mass index is 29.12 kg/m  as calculated from the following:    Height as of 5/9/25: 1.499 m (4' 11\").    Weight as of this encounter: 65.4 kg (144 lb 2.9 oz).  Temp Readings from Last 1 Encounters:  05/16/25 : 98.8  F (37.1  C) (Oral)      Pain: Pain goal 0/10 Pain Rating 0/10 Effective pain medication/regimen Scheduled Tylenol     CV Surgery Patient: Yes Post Op Day #: 5    Assessment    General: Afebrile yes  Rhythm: normal sinus rhythm  Blood Pressure Medications given/held: Beta blocker Given Other Hydralazine given  Resp: Oxygen Status: RA  Patient slept last night Yes   Incentive Spirometry Q 1-2 hour when awake: yes Volume: 500  Neuro: Alert yes Orientation: self, person, time, and place  GI/:          Bowel Activity: yes if yes indicate when: 5/16          Bowel Medications: yes          Urinary Catheter: yes  Skin:          Incision: Incision status: healing well          Epicardial Pacing Wires: no  Chest Tubes   Removed yesterday      Assessment    Resp: RA  Telemetry: SR  Neuro: A&Ox3, disoriented to situation  GI/: Regular diet. Adequate UOP via rand. 2 BM's.   Skin/Wounds: Blanchable redness to sacrum. Sternal incision. Old CT sites. Right groin site. LLE graft sites.   Lines/Drains: PIV SL  Activity: Ax2 sera steady and GB  Sleep: Fair  Abnormal Labs: Replaced K (x2) and Phos. Recheck all lytes in AM.     Aggression Stop Light: Green          Patient Care Plan: TCU at discharge, SW following. Accepted to [facility] pending prior auth.     "

## 2025-05-17 ENCOUNTER — APPOINTMENT (OUTPATIENT)
Dept: OCCUPATIONAL THERAPY | Facility: CLINIC | Age: 85
DRG: 233 | End: 2025-05-17
Payer: COMMERCIAL

## 2025-05-17 LAB
ANION GAP SERPL CALCULATED.3IONS-SCNC: 9 MMOL/L (ref 7–15)
BUN SERPL-MCNC: 35.3 MG/DL (ref 8–23)
CALCIUM SERPL-MCNC: 8.1 MG/DL (ref 8.8–10.4)
CHLORIDE SERPL-SCNC: 108 MMOL/L (ref 98–107)
CREAT SERPL-MCNC: 1.49 MG/DL (ref 0.51–0.95)
EGFRCR SERPLBLD CKD-EPI 2021: 34 ML/MIN/1.73M2
ERYTHROCYTE [DISTWIDTH] IN BLOOD BY AUTOMATED COUNT: 14.8 % (ref 10–15)
GLUCOSE SERPL-MCNC: 106 MG/DL (ref 70–99)
HCO3 SERPL-SCNC: 25 MMOL/L (ref 22–29)
HCT VFR BLD AUTO: 31.7 % (ref 35–47)
HGB BLD-MCNC: 10.4 G/DL (ref 11.7–15.7)
MAGNESIUM SERPL-MCNC: 2 MG/DL (ref 1.7–2.3)
MCH RBC QN AUTO: 29.7 PG (ref 26.5–33)
MCHC RBC AUTO-ENTMCNC: 32.8 G/DL (ref 31.5–36.5)
MCV RBC AUTO: 91 FL (ref 78–100)
PHOSPHATE SERPL-MCNC: 2.2 MG/DL (ref 2.5–4.5)
PLATELET # BLD AUTO: 217 10E3/UL (ref 150–450)
POTASSIUM SERPL-SCNC: 3.6 MMOL/L (ref 3.4–5.3)
RBC # BLD AUTO: 3.5 10E6/UL (ref 3.8–5.2)
SODIUM SERPL-SCNC: 142 MMOL/L (ref 135–145)
WBC # BLD AUTO: 12.5 10E3/UL (ref 4–11)

## 2025-05-17 PROCEDURE — 83735 ASSAY OF MAGNESIUM: CPT | Performed by: PHYSICIAN ASSISTANT

## 2025-05-17 PROCEDURE — 250N000011 HC RX IP 250 OP 636: Performed by: PHYSICIAN ASSISTANT

## 2025-05-17 PROCEDURE — 85027 COMPLETE CBC AUTOMATED: CPT | Performed by: PHYSICIAN ASSISTANT

## 2025-05-17 PROCEDURE — 93010 ELECTROCARDIOGRAM REPORT: CPT | Performed by: INTERNAL MEDICINE

## 2025-05-17 PROCEDURE — 93005 ELECTROCARDIOGRAM TRACING: CPT

## 2025-05-17 PROCEDURE — 250N000013 HC RX MED GY IP 250 OP 250 PS 637: Performed by: STUDENT IN AN ORGANIZED HEALTH CARE EDUCATION/TRAINING PROGRAM

## 2025-05-17 PROCEDURE — 82374 ASSAY BLOOD CARBON DIOXIDE: CPT | Performed by: PHYSICIAN ASSISTANT

## 2025-05-17 PROCEDURE — 250N000013 HC RX MED GY IP 250 OP 250 PS 637: Performed by: PHYSICIAN ASSISTANT

## 2025-05-17 PROCEDURE — 97110 THERAPEUTIC EXERCISES: CPT | Mod: GO

## 2025-05-17 PROCEDURE — 250N000013 HC RX MED GY IP 250 OP 250 PS 637: Performed by: NURSE PRACTITIONER

## 2025-05-17 PROCEDURE — 97530 THERAPEUTIC ACTIVITIES: CPT | Mod: GO

## 2025-05-17 PROCEDURE — 36415 COLL VENOUS BLD VENIPUNCTURE: CPT | Performed by: PHYSICIAN ASSISTANT

## 2025-05-17 PROCEDURE — 120N000001 HC R&B MED SURG/OB

## 2025-05-17 PROCEDURE — 80048 BASIC METABOLIC PNL TOTAL CA: CPT | Performed by: PHYSICIAN ASSISTANT

## 2025-05-17 PROCEDURE — 84100 ASSAY OF PHOSPHORUS: CPT | Performed by: PHYSICIAN ASSISTANT

## 2025-05-17 RX ORDER — MAGNESIUM OXIDE 400 MG/1
400 TABLET ORAL EVERY 4 HOURS
Status: COMPLETED | OUTPATIENT
Start: 2025-05-17 | End: 2025-05-17

## 2025-05-17 RX ORDER — FUROSEMIDE 10 MG/ML
40 INJECTION INTRAMUSCULAR; INTRAVENOUS ONCE
Status: COMPLETED | OUTPATIENT
Start: 2025-05-17 | End: 2025-05-17

## 2025-05-17 RX ORDER — POTASSIUM CHLORIDE 20MEQ/15ML
10 LIQUID (ML) ORAL ONCE
Status: COMPLETED | OUTPATIENT
Start: 2025-05-17 | End: 2025-05-17

## 2025-05-17 RX ORDER — ENALAPRIL MALEATE 5 MG/1
5 TABLET ORAL DAILY
Status: DISCONTINUED | OUTPATIENT
Start: 2025-05-17 | End: 2025-05-18

## 2025-05-17 RX ADMIN — POTASSIUM CHLORIDE 10 MEQ: 1.5 SOLUTION ORAL at 09:42

## 2025-05-17 RX ADMIN — FUROSEMIDE 40 MG: 10 INJECTION, SOLUTION INTRAMUSCULAR; INTRAVENOUS at 09:42

## 2025-05-17 RX ADMIN — Medication 400 MG: at 12:18

## 2025-05-17 RX ADMIN — HYDRALAZINE HYDROCHLORIDE 10 MG: 10 TABLET ORAL at 09:43

## 2025-05-17 RX ADMIN — Medication 400 MG: at 09:43

## 2025-05-17 RX ADMIN — QUETIAPINE 12.5 MG: 25 TABLET, FILM COATED ORAL at 22:20

## 2025-05-17 RX ADMIN — HYDRALAZINE HYDROCHLORIDE 10 MG: 20 INJECTION INTRAMUSCULAR; INTRAVENOUS at 20:42

## 2025-05-17 RX ADMIN — POTASSIUM & SODIUM PHOSPHATES POWDER PACK 280-160-250 MG 1 PACKET: 280-160-250 PACK at 09:41

## 2025-05-17 RX ADMIN — METOPROLOL SUCCINATE 50 MG: 50 TABLET, FILM COATED, EXTENDED RELEASE ORAL at 09:43

## 2025-05-17 RX ADMIN — POTASSIUM & SODIUM PHOSPHATES POWDER PACK 280-160-250 MG 1 PACKET: 280-160-250 PACK at 12:18

## 2025-05-17 RX ADMIN — HYDRALAZINE HYDROCHLORIDE 15 MG: 10 TABLET ORAL at 15:12

## 2025-05-17 RX ADMIN — PANTOPRAZOLE SODIUM 40 MG: 40 TABLET, DELAYED RELEASE ORAL at 09:43

## 2025-05-17 RX ADMIN — ACETAMINOPHEN 975 MG: 325 TABLET, FILM COATED ORAL at 15:12

## 2025-05-17 RX ADMIN — ACETAMINOPHEN 975 MG: 325 TABLET, FILM COATED ORAL at 22:20

## 2025-05-17 RX ADMIN — ROSUVASTATIN CALCIUM 5 MG: 5 TABLET, FILM COATED ORAL at 09:43

## 2025-05-17 RX ADMIN — ENALAPRIL MALEATE 5 MG: 5 TABLET ORAL at 12:18

## 2025-05-17 RX ADMIN — HEPARIN SODIUM 5000 UNITS: 5000 INJECTION, SOLUTION INTRAVENOUS; SUBCUTANEOUS at 06:48

## 2025-05-17 RX ADMIN — HEPARIN SODIUM 5000 UNITS: 5000 INJECTION, SOLUTION INTRAVENOUS; SUBCUTANEOUS at 18:11

## 2025-05-17 RX ADMIN — HYDRALAZINE HYDROCHLORIDE 15 MG: 10 TABLET ORAL at 22:20

## 2025-05-17 RX ADMIN — ASPIRIN 81 MG CHEWABLE TABLET 162 MG: 81 TABLET CHEWABLE at 09:42

## 2025-05-17 RX ADMIN — ACETAMINOPHEN 975 MG: 325 TABLET, FILM COATED ORAL at 06:48

## 2025-05-17 ASSESSMENT — ACTIVITIES OF DAILY LIVING (ADL)
ADLS_ACUITY_SCORE: 55
ADLS_ACUITY_SCORE: 56
ADLS_ACUITY_SCORE: 56
ADLS_ACUITY_SCORE: 55
ADLS_ACUITY_SCORE: 56
ADLS_ACUITY_SCORE: 56
ADLS_ACUITY_SCORE: 55
ADLS_ACUITY_SCORE: 56
ADLS_ACUITY_SCORE: 55
ADLS_ACUITY_SCORE: 56
ADLS_ACUITY_SCORE: 56
ADLS_ACUITY_SCORE: 55
ADLS_ACUITY_SCORE: 56
ADLS_ACUITY_SCORE: 55
ADLS_ACUITY_SCORE: 55

## 2025-05-17 NOTE — PROGRESS NOTES
[facility]  Cardiovascular and Thoracic Surgery Daily Note    Assessment and Plan  Mikayla Sotomayor is a 84 year old female with a PMH of hypertension, dyslipidemia, DMT2, and CKD stage 3 who was admitted to [facility] 05/09 with one month history of right arm pain which progressed to resting chest pain and found to have NSTEMI. HS troponin peal 2844 and not trended further. TTE 05/10 with EF 35% with multiple WMA, mild MR, and mild TR. Transferred to [facility] 05/10 for urgent angiogram which demonstrated critical 99% LMCA stenosis, IABP placed, and CV surgery consulted for consideration of surgical revascularization. Underwent urgent CABG x3 (LIMA to LAD, SVG to OM2) on 05/11 with Dr. Guzman.     POD # 6 s/p coronary artery bypass grafting x2 (LIMA to LAD and SVG to OM) on 5/11/25 with Dr. Roberto Guzman.     - CVS:   Pre-op TTE with LVEF 35%, grade I diastolic dysfunction and preserved RV function. 1-2+ MR, 1-2+ TR. TTE 05/16 shows EF improved to 50-55% postoperatively.   Postop mixed vasoplegic and cardiogenic shock, resolved. Off pressor/inotropic support POD1. IABP removed intraoperatively.  Hypertensive at times. Continue Toprol 50 mg daily, resume PTA hydralazine 10 mg daily, will resume PTA enalapril at decreased dose 5 mg daily now that renal function back to baseline.   Aspirin 162 mg daily, continue rosuvastatin 5 mg daily and up titrate as able (new med for patient)  Optimize GDMT as able. CORE consult placed 5/12/25 and note dropped to schedule at discharge with CORE.   Hypervolemic, repeat lasix 40 mg IV x1 today  Chest tubes and TPWs removed POD4  Wound vac removed POD4  PTA meds on hold: Hydrodiuril     - Resp: Postoperative mechanical ventilation, resolved. Extubated pod#1. Encourage IS, pulm hygiene    - Neuro:   No focal neurologic deficits. Ongoing generalized weakness.  Postop delirium, improving. Oriented x4 today. Per son, has baseline memory issues. Continue delirium precautions. Continue  12.5 mg seroquel at HS (QTc 446 ms).   Pain controlled on current regimen. Narcotics discontinued.      - Renal: ZANE (resolved) on CKD stage 3. BL Cr appears to be ~1.6. Trend BMP. Volume management as above.  Recent Labs   Lab 05/17/25  0607 05/16/25  0641 05/15/25  0838   CR 1.49* 1.61* 1.87*       - GI: + BM, + flatus, continue bowel regimen. Dysphagia resolved, diet advanced.     - : Navarro removed POD2, voiding well on own but some retention. Navarro replaced 05/15. due to high residuals. UA 5/16 not suggestive of infection.     - Endo: DMT2, diet-controlled. Postop stress hyperglycemia.  Insulin infusion, transitioned to sliding scale insulin, now discontinued.  Hemoglobin A1C   Date Value Ref Range Status   05/10/2025 6.0 (H) <5.7 % Final     Comment:     Normal <5.7%   Prediabetes 5.7-6.4%    Diabetes 6.5% or higher     Note: Adopted from ADA consensus guidelines.   01/07/2019 6.9 (H) 0 - 5.6 % Final     Comment:     Normal <5.7% Prediabetes 5.7-6.4%  Diabetes 6.5% or higher - adopted from ADA   consensus guidelines.          - FEN: Replace electrolytes as needed. Tolerating diet, on calorie counts per dietician. Regular diets, thin liquids.      - ID: Postop leukocytosis, likely reactive from surgery. Afebrile. WBC mildly elevated. Periop abx prophylaxis completed. UA 05/16 not suggestive of infection. Trend CBC and fever curve.   Recent Labs   Lab 05/17/25  0607 05/16/25  0641 05/15/25  0838   WBC 12.5* 11.8* 13.5*       - Heme: Acute blood loss anemia and thrombocytopenia due to surgery. Hgb and PLT stable. Trend CBC, transfuse PRN. Transfused 1U PRBc 5/13 am.  Recent Labs   Lab 05/17/25  0607 05/16/25  0641 05/15/25  0838   HGB 10.4* 10.1* 10.8*    178 170       - Proph: SCD, subcutaneous heparin, PPI    - Other:  Clinically Significant Risk Factors        # Hypokalemia: Lowest K = 3.3 mmol/L in last 2 days, will replace as needed   # Hyperchloremia: Highest Cl = 108 mmol/L in last 2 days, will  "monitor as appropriate          # Hypoalbuminemia: Lowest albumin = 3.3 g/dL at 5/11/2025  9:35 PM, will monitor as appropriate       # Hypertension: Noted on problem list     # Acute Hypoxic Respiratory Failure: Based on blood gas results. Continue supplemental oxygen as needed         # Overweight: Estimated body mass index is 29.12 kg/m  as calculated from the following:    Height as of 5/9/25: 1.499 m (4' 11\").    Weight as of this encounter: 65.4 kg (144 lb 2.9 oz).       # History of CABG: noted on surgical history       - Dispo: IMC. Therapies recommending TCU at discharge. Medically ready for discharge today, pending insurance authorization.        Interval History  Doing better today. Much more alert and interactive with family. Able to participate more with therapies. Up to chair.       Medications  Current Facility-Administered Medications   Medication Dose Route Frequency Provider Last Rate Last Admin    acetaminophen (TYLENOL) tablet 975 mg  975 mg Oral Q8H Flower Baeza PA-C   975 mg at 05/17/25 0648    aspirin (ASA) chewable tablet 162 mg  162 mg Oral or NG Tube Daily Flower Baeza PA-C   162 mg at 05/17/25 0942    enalapril (VASOTEC) tablet 5 mg  5 mg Oral Daily Grecia Mckeon PA-C        heparin ANTICOAGULANT injection 5,000 Units  5,000 Units Subcutaneous Q12H Flower Baeza PA-C   5,000 Units at 05/17/25 0648    hydrALAZINE (APRESOLINE) half-tab 15 mg  15 mg Oral TID Grecia Mckeon PA-C        magnesium oxide (MAG-OX) tablet 400 mg  400 mg Oral Q4H Gabe Guzman MD   400 mg at 05/17/25 0943    metoprolol succinate ER (TOPROL XL) 24 hr tablet 50 mg  50 mg Oral Daily Bridget Jolley NP   50 mg at 05/17/25 0943    pantoprazole (PROTONIX) 2 mg/mL suspension 40 mg  40 mg Oral or NG Tube Daily Flower Baeza PA-C   40 mg at 05/12/25 0821    Or    pantoprazole (PROTONIX) EC tablet 40 mg  40 mg Oral Daily Flower Baeza PA-C   40 mg at 05/17/25 0943    polyethylene " glycol (MIRALAX) Packet 17 g  17 g Oral Daily Flower Baeza PA-C   17 g at 05/16/25 0853    potassium & sodium phosphates (NEUTRA-PHOS) Packet 1 packet  1 packet Oral or Feeding Tube Q4H Gabe Guzman MD   1 packet at 05/17/25 0941    QUEtiapine (SEROquel) half-tab 12.5 mg  12.5 mg Oral At Bedtime Bridget Jolley NP   12.5 mg at 05/16/25 2139    rosuvastatin (CRESTOR) tablet 5 mg  5 mg Oral Daily Flower Baeza PA-C   5 mg at 05/17/25 0943    senna-docusate (SENOKOT-S/PERICOLACE) 8.6-50 MG per tablet 2 tablet  2 tablet Oral BID Floewr Baeza PA-C   2 tablet at 05/16/25 0852    sodium chloride (PF) 0.9% PF flush 3 mL  3 mL Intracatheter Q8H Flower Baeza PA-C   3 mL at 05/16/25 1538     Current Facility-Administered Medications   Medication Dose Route Frequency Provider Last Rate Last Admin    acetaminophen (TYLENOL) tablet 650 mg  650 mg Oral Q4H PRN Flower Baeza PA-C   650 mg at 05/11/25 1357    bisacodyl (DULCOLAX) suppository 10 mg  10 mg Rectal Daily PRN Flower Baeza PA-C        glucose gel 15-30 g  15-30 g Oral Q15 Min PRN Flower Baeza PA-C        Or    dextrose 50 % injection 25-50 mL  25-50 mL Intravenous Q15 Min PRN Flower Baeza PA-C        Or    glucagon injection 1 mg  1 mg Subcutaneous Q15 Min PRN Flower Baeza PA-C        hydrALAZINE (APRESOLINE) injection 10 mg  10 mg Intravenous Q30 Min PRN Flower Baeza PA-C   10 mg at 05/14/25 0609    lactated ringers BOLUS 500 mL  500 mL Intravenous Once PRN Flower Baeza PA-C        lidocaine (LMX4) cream   Topical Q1H PRN Flower Baeza PA-C        lidocaine 1 % 0.1-1 mL  0.1-1 mL Other Q1H PRN Flower Baeza PA-C        magnesium hydroxide (MILK OF MAGNESIA) suspension 30 mL  30 mL Oral Daily PRN Flower Baeza PA-C        naloxone (NARCAN) injection 0.2 mg  0.2 mg Intravenous Q2 Min PRN Flower Baeza PA-C        Or    naloxone (NARCAN) injection 0.4 mg  0.4 mg  Intravenous Q2 Min PRN Flower Baeza PA-C        Or    naloxone (NARCAN) injection 0.2 mg  0.2 mg Intramuscular Q2 Min PRN Flower Baeza PA-C        Or    naloxone (NARCAN) injection 0.4 mg  0.4 mg Intramuscular Q2 Min PRN Flower Baeza PA-C        ondansetron (ZOFRAN ODT) ODT tab 4 mg  4 mg Oral Q6H PRN Flower Baeza PA-C        Or    ondansetron (ZOFRAN) injection 4 mg  4 mg Intravenous Q6H PRN Flower Baeza PA-C   4 mg at 05/14/25 0913    sodium chloride (PF) 0.9% PF flush 3 mL  3 mL Intracatheter q1 min prn Flower Baeza PA-C        sodium chloride 0.9% BOLUS 1-250 mL  1-250 mL Intravenous Q1H PRN Flower Baeza PA-C             Physical Exam  Vitals were reviewed  Blood pressure 132/60, pulse 84, temperature 97.6  F (36.4  C), temperature source Oral, resp. rate 17, weight 65.4 kg (144 lb 2.9 oz), SpO2 99%.    Rhythm: NSR    Lungs: diminished bases    Cardiovascular: Distant, S1, S2, RRR, no m/r/g    Abdomen: soft, NT, ND     Extremeties: warm, 1+ BLE edema    Incision: CDI.     CT: N/A    Weight:   Vitals:    05/12/25 0515 05/13/25 0559 05/14/25 0306 05/15/25 0606   Weight: 53.4 kg (117 lb 11.6 oz) 58.5 kg (128 lb 15.5 oz) 68.2 kg (150 lb 5.7 oz) 66.4 kg (146 lb 6.2 oz)    05/16/25 0436   Weight: 65.4 kg (144 lb 2.9 oz)         Data  Recent Labs   Lab 05/17/25  0607 05/16/25  1259 05/16/25  0641 05/15/25  1723 05/15/25  1207 05/15/25  0838 05/11/25  2139 05/11/25  2135 05/11/25  1334 05/11/25  1318 05/11/25  1139 05/11/25  1138 05/10/25  1853 05/10/25  1739   WBC 12.5*  --  11.8*  --   --  13.5*   < >  --   --  27.2*  --  22.3*   < >  --    HGB 10.4*  --  10.1*  --   --  10.8*   < >  --   --  10.1*   < > 8.1*   < >  --    MCV 91  --  88  --   --  90   < >  --   --  94  --  94   < >  --      --  178  --   --  170   < >  --   --  156  --  121*   < >  --    INR  --   --   --   --   --   --   --   --   --  1.35*  --  1.47*  --  1.06     --  139  --   --  138    < > 144  --  138   < > 140   < > 140   POTASSIUM 3.6 3.8 3.3*  --   --  3.5   < > 3.6  --  4.2   < > 4.4   < > 4.4   CHLORIDE 108*  --  105  --   --  102   < > 108*  --  103  --  106   < > 102   CO2 25  --  24  --   --  24   < > 25  --  15*  --  20*   < > 22   BUN 35.3*  --  40.1*  --   --  47.0*   < > 27.9*  --  27.1*  --  28.1*   < > 28.6*   CR 1.49*  --  1.61*  --   --  1.87*   < > 1.83*  --  1.61*  --  1.67*   < > 1.66*   ANIONGAP 9  --  10  --   --  12   < > 11  --  20*  --  14   < > 16*   LISA 8.1*  --  8.2*  --   --  8.6*   < > 9.0  --  9.0  --  9.0   < > 9.7   *  --  148* 138*   < > 116*   < > 142*   < > 215*   < > 152*   < > 98   ALBUMIN  --   --   --   --   --   --   --  3.3*  --  3.4*  --   --   --   --    PROTTOTAL  --   --   --   --   --   --   --  5.3*  --  5.4*  --   --   --   --    BILITOTAL  --   --   --   --   --   --   --  0.2  --  0.4  --   --   --   --    ALKPHOS  --   --   --   --   --   --   --  42  --  43  --   --   --   --    ALT  --   --   --   --   --   --   --  11  --  16  --   --   --   --    AST  --   --   --   --   --   --   --  122*  --   --   --   --   --   --     < > = values in this interval not displayed.       Imaging:  Recent Results (from the past 24 hours)   XR Chest 2 Views    Narrative    EXAM: XR CHEST 2 VIEWS  LOCATION: [facility]  DATE: 5/16/2025    INDICATION: Chest tube removal  COMPARISON: 05/14/2025      Impression    IMPRESSION: Previously seen chest tubes have been removed. No pneumothorax. Small left greater than right pleural effusions present with left lower lobe consolidation/atelectasis. No pulmonary edema.           Patient seen and discussed with Dr. Mulvihill Arielle Webb, PA-C  Cardiothoracic Surgery  Available for paging [phone] (personal pager or CV Surgery Rounding Pager)  Personal Pager: [phone]  CV Surgery Rounding Pager: [phone]  After hours please page surgeon on-call

## 2025-05-17 NOTE — PLAN OF CARE
Vital signs stable on room air. Alert and oriented. Lung sounds diminished. Bowel sounds active, flatus present. Sternal incision and chest tube sites clean dry and intact. Pain controlled with scheduled tylenol. Up with 2 and sera steady. Tolerating diet with pills whole. CMS/Neuros intact. Adequate rand output. Tele normal sinus rhythm.

## 2025-05-17 NOTE — PROGRESS NOTES
"CALORIE COUNT      Approximate Oral Intake for:    5/16  Calories: 1333  Protein: 51 g pro      Estimated Needs:    46.6 kg (Adjusted Body Weight)  Energy: 7628-1876 kcals/day (25 - 30 kcals/kg of adjusted wt)  Justification: Maintenance  Protein: 56-70 grams protein/day (1.2 - 1.5 grams of pro/kg of adjusted wt)  Justification: Maintenance      Summary: Regular diet, Ensure Enlive between meals     - meets 100% estimated energy needs, 91% estimated protein needs in 2 meals and 1 supplements (2 meals sent, 2 supplements sent; \"Pt only drank one Ensure\").     Calorie counts continue through 5/17.         "

## 2025-05-17 NOTE — PLAN OF CARE
"Patient Name: Juana  MRN: [id number]  Date of Admission: 5/10/2025  Reason for Admission: CABGx2  Level of Care: acute    Vitals:   BP Readings from Last 1 Encounters:  05/17/25 : (!) 149/72    Pulse Readings from Last 1 Encounters:  05/17/25 : 84    Wt Readings from Last 1 Encounters:  05/16/25 : 65.4 kg (144 lb 2.9 oz)    Ht Readings from Last 1 Encounters:  05/09/25 : 1.499 m (4' 11\")    Estimated body mass index is 29.12 kg/m  as calculated from the following:    Height as of 5/9/25: 1.499 m (4' 11\").    Weight as of this encounter: 65.4 kg (144 lb 2.9 oz).  Temp Readings from Last 1 Encounters:  05/17/25 : 98.7  F (37.1  C) (Oral)      Pain: Pain goal 1/10 Pain Rating 2/10 Effective pain medication/regimen tylenol, per pt \"goes away on own\"    CV Surgery Patient: Yes Post Op Day #: 6    Assessment    General: Afebrile yes  Rhythm: SR with ST abnormality & inverted T wave  Blood Pressure Medications given/held:  Beta blocker Given Other: hydralazine, rosuvastatin, enalapril   Resp: Oxygen Status: RA  Patient slept last night Yes   Incentive Spirometry Q 1-2 hour when awake: yes Volume: 500  Neuro: Alert yes Orientation: self, person, and place  GI/:          Bowel Activity: yes if yes indicate when: 5/17, smear          Bowel Medications: no dd/t report of loose stools          Urinary Catheter: yes  Skin:          Incision: Incision status: healing well and opened          Epicardial Pacing Wires: no  Chest Tubes   none    Assessment    Resp: RA, CHIN, IS encouraged  Telemetry: SR with ST abnormality & inverted T wave  Neuro: denies numbness and tingling, disoriented to time   GI/: rand, incontinent of bowel  Skin/Wounds: midsternal incision, LLE harvest sites, R groin site  Lines/Drains: PIV SL  Activity: A1/pivot/ few steps   Abnormal Labs: replaced k/mh/phos this am, recheck tomorrow AM    Aggression Stop Light: Green          Patient Care Plan: Medically ready for discharge, pending insurance " authorization

## 2025-05-17 NOTE — PROGRESS NOTES
Care Management Follow Up    Length of Stay (days): 7    Expected Discharge Date: 05/17/2025     Concerns to be Addressed: discharge planning     Patient plan of care discussed at interdisciplinary rounds: Yes    Anticipated Discharge Disposition: Transitional Care              Anticipated Discharge Services:    Anticipated Discharge DME:      Patient/family educated on Medicare website which has current facility and service quality ratings: no  Education Provided on the Discharge Plan: Yes  Patient/Family in Agreement with the Plan: yes    Referrals Placed by CM/SW: Post Acute Facilities  Private pay costs discussed:     Discussed  Partnership in Safe Discharge Planning  document with patient/family:      Handoff Completed:     Additional Information:  Per IMC Charge, patient is medically ready for discharge to TCU.  Per  note from 5/16 patient has been clinically accepted by [facility].  SNF authorization is required before patient discharges.  A SNF request was made yesterday to [facility] and clinical information was faxed.  Writer contacted [facility] today regarding SNF authorization [id number].  Their record indicates they have received the clinical information. The person I spoke to said she will send an email to the nurse reviewers that patient is ready for discharge today and that we are waiting SNF authorization.  Writer also sent the OT therapy note from today.  Writer gave her the following contact number and fax number for today or tomorrow.  Those numbers are [phone] for  and fax is to the 6th floor fax number of [phone].     Contacted [facility] to ask if [facility] has a vacancy for today. Berenice in Intake responds not today as the room is under repair.  Writer has asked her when the room will be ready for admission    Updated Charge RN.   Updated daughter Melissa.  She also asks that they would like a private TCU room if available and writer did inform dtr. Of the daily fee  of $49.00 a day.  Daughter reports her father is fine with this fee.    Next Steps:   Follow up with Noni.    Wait to hear when the room is ready at [facility].  Keep daughter or spouse updated  Coordinate transport  Complete PAS.        Lanny Dickerson, PAGESW

## 2025-05-18 ENCOUNTER — APPOINTMENT (OUTPATIENT)
Dept: OCCUPATIONAL THERAPY | Facility: CLINIC | Age: 85
DRG: 233 | End: 2025-05-18
Payer: COMMERCIAL

## 2025-05-18 ENCOUNTER — APPOINTMENT (OUTPATIENT)
Dept: GENERAL RADIOLOGY | Facility: CLINIC | Age: 85
DRG: 233 | End: 2025-05-18
Attending: PHYSICIAN ASSISTANT
Payer: COMMERCIAL

## 2025-05-18 LAB
ALBUMIN UR-MCNC: 20 MG/DL
ANION GAP SERPL CALCULATED.3IONS-SCNC: 10 MMOL/L (ref 7–15)
APPEARANCE UR: ABNORMAL
ATRIAL RATE - MUSE: 84 BPM
BACTERIA #/AREA URNS HPF: ABNORMAL /HPF
BILIRUB UR QL STRIP: NEGATIVE
BUN SERPL-MCNC: 31.2 MG/DL (ref 8–23)
CALCIUM SERPL-MCNC: 8.4 MG/DL (ref 8.8–10.4)
CHLORIDE SERPL-SCNC: 106 MMOL/L (ref 98–107)
COLOR UR AUTO: YELLOW
CREAT SERPL-MCNC: 1.44 MG/DL (ref 0.51–0.95)
DIASTOLIC BLOOD PRESSURE - MUSE: NORMAL MMHG
EGFRCR SERPLBLD CKD-EPI 2021: 35 ML/MIN/1.73M2
ERYTHROCYTE [DISTWIDTH] IN BLOOD BY AUTOMATED COUNT: 15 % (ref 10–15)
GLUCOSE SERPL-MCNC: 128 MG/DL (ref 70–99)
GLUCOSE UR STRIP-MCNC: NEGATIVE MG/DL
HCO3 SERPL-SCNC: 27 MMOL/L (ref 22–29)
HCT VFR BLD AUTO: 32.8 % (ref 35–47)
HGB BLD-MCNC: 10.9 G/DL (ref 11.7–15.7)
HGB UR QL STRIP: ABNORMAL
INTERPRETATION ECG - MUSE: NORMAL
KETONES UR STRIP-MCNC: NEGATIVE MG/DL
LEUKOCYTE ESTERASE UR QL STRIP: ABNORMAL
MAGNESIUM SERPL-MCNC: 1.8 MG/DL (ref 1.7–2.3)
MCH RBC QN AUTO: 30.2 PG (ref 26.5–33)
MCHC RBC AUTO-ENTMCNC: 33.2 G/DL (ref 31.5–36.5)
MCV RBC AUTO: 91 FL (ref 78–100)
NITRATE UR QL: POSITIVE
P AXIS - MUSE: 51 DEGREES
PH UR STRIP: 6 [PH] (ref 5–7)
PHOSPHATE SERPL-MCNC: 2.4 MG/DL (ref 2.5–4.5)
PLATELET # BLD AUTO: 295 10E3/UL (ref 150–450)
POTASSIUM SERPL-SCNC: 4.1 MMOL/L (ref 3.4–5.3)
PR INTERVAL - MUSE: 144 MS
QRS DURATION - MUSE: 88 MS
QT - MUSE: 396 MS
QTC - MUSE: 467 MS
R AXIS - MUSE: 38 DEGREES
RBC # BLD AUTO: 3.61 10E6/UL (ref 3.8–5.2)
RBC URINE: 25 /HPF
SODIUM SERPL-SCNC: 143 MMOL/L (ref 135–145)
SP GR UR STRIP: 1.02 (ref 1–1.03)
SQUAMOUS EPITHELIAL: 4 /HPF
SYSTOLIC BLOOD PRESSURE - MUSE: NORMAL MMHG
T AXIS - MUSE: 206 DEGREES
UROBILINOGEN UR STRIP-MCNC: NORMAL MG/DL
VENTRICULAR RATE- MUSE: 84 BPM
WBC # BLD AUTO: 16.3 10E3/UL (ref 4–11)
WBC CLUMPS #/AREA URNS HPF: PRESENT /HPF
WBC URINE: >182 /HPF

## 2025-05-18 PROCEDURE — 85018 HEMOGLOBIN: CPT | Performed by: PHYSICIAN ASSISTANT

## 2025-05-18 PROCEDURE — 85048 AUTOMATED LEUKOCYTE COUNT: CPT | Performed by: PHYSICIAN ASSISTANT

## 2025-05-18 PROCEDURE — 250N000013 HC RX MED GY IP 250 OP 250 PS 637: Performed by: PHYSICIAN ASSISTANT

## 2025-05-18 PROCEDURE — 81001 URINALYSIS AUTO W/SCOPE: CPT | Performed by: PHYSICIAN ASSISTANT

## 2025-05-18 PROCEDURE — 97535 SELF CARE MNGMENT TRAINING: CPT | Mod: GO

## 2025-05-18 PROCEDURE — 250N000011 HC RX IP 250 OP 636: Performed by: PHYSICIAN ASSISTANT

## 2025-05-18 PROCEDURE — 250N000013 HC RX MED GY IP 250 OP 250 PS 637: Performed by: STUDENT IN AN ORGANIZED HEALTH CARE EDUCATION/TRAINING PROGRAM

## 2025-05-18 PROCEDURE — 80048 BASIC METABOLIC PNL TOTAL CA: CPT | Performed by: PHYSICIAN ASSISTANT

## 2025-05-18 PROCEDURE — 83735 ASSAY OF MAGNESIUM: CPT | Performed by: PHYSICIAN ASSISTANT

## 2025-05-18 PROCEDURE — 84100 ASSAY OF PHOSPHORUS: CPT | Performed by: PHYSICIAN ASSISTANT

## 2025-05-18 PROCEDURE — 87186 SC STD MICRODIL/AGAR DIL: CPT | Performed by: PHYSICIAN ASSISTANT

## 2025-05-18 PROCEDURE — 71045 X-RAY EXAM CHEST 1 VIEW: CPT

## 2025-05-18 PROCEDURE — 250N000013 HC RX MED GY IP 250 OP 250 PS 637: Performed by: NURSE PRACTITIONER

## 2025-05-18 PROCEDURE — 82374 ASSAY BLOOD CARBON DIOXIDE: CPT | Performed by: PHYSICIAN ASSISTANT

## 2025-05-18 PROCEDURE — 36415 COLL VENOUS BLD VENIPUNCTURE: CPT | Performed by: PHYSICIAN ASSISTANT

## 2025-05-18 PROCEDURE — 120N000001 HC R&B MED SURG/OB

## 2025-05-18 RX ORDER — ENALAPRIL MALEATE 10 MG/1
10 TABLET ORAL DAILY
Status: DISCONTINUED | OUTPATIENT
Start: 2025-05-18 | End: 2025-05-18

## 2025-05-18 RX ORDER — ENALAPRIL MALEATE 10 MG/1
10 TABLET ORAL ONCE
Status: COMPLETED | OUTPATIENT
Start: 2025-05-18 | End: 2025-05-18

## 2025-05-18 RX ORDER — CEFTRIAXONE 1 G/1
1 INJECTION, POWDER, FOR SOLUTION INTRAMUSCULAR; INTRAVENOUS EVERY 24 HOURS
Status: DISCONTINUED | OUTPATIENT
Start: 2025-05-18 | End: 2025-05-19

## 2025-05-18 RX ORDER — MAGNESIUM OXIDE 400 MG/1
400 TABLET ORAL EVERY 4 HOURS
Status: COMPLETED | OUTPATIENT
Start: 2025-05-18 | End: 2025-05-18

## 2025-05-18 RX ORDER — HYDRALAZINE HYDROCHLORIDE 10 MG/1
10 TABLET, FILM COATED ORAL 3 TIMES DAILY
Status: DISCONTINUED | OUTPATIENT
Start: 2025-05-18 | End: 2025-05-19 | Stop reason: HOSPADM

## 2025-05-18 RX ORDER — ENALAPRIL MALEATE 10 MG/1
20 TABLET ORAL DAILY
Status: DISCONTINUED | OUTPATIENT
Start: 2025-05-19 | End: 2025-05-19 | Stop reason: HOSPADM

## 2025-05-18 RX ORDER — FUROSEMIDE 10 MG/ML
40 INJECTION INTRAMUSCULAR; INTRAVENOUS ONCE
Status: COMPLETED | OUTPATIENT
Start: 2025-05-18 | End: 2025-05-18

## 2025-05-18 RX ADMIN — POTASSIUM & SODIUM PHOSPHATES POWDER PACK 280-160-250 MG 1 PACKET: 280-160-250 PACK at 13:43

## 2025-05-18 RX ADMIN — METOPROLOL SUCCINATE 50 MG: 50 TABLET, FILM COATED, EXTENDED RELEASE ORAL at 09:34

## 2025-05-18 RX ADMIN — ENALAPRIL MALEATE 10 MG: 10 TABLET ORAL at 09:52

## 2025-05-18 RX ADMIN — ENALAPRIL MALEATE 10 MG: 10 TABLET ORAL at 14:02

## 2025-05-18 RX ADMIN — ACETAMINOPHEN 975 MG: 325 TABLET, FILM COATED ORAL at 06:00

## 2025-05-18 RX ADMIN — HYDRALAZINE HYDROCHLORIDE 15 MG: 10 TABLET ORAL at 09:30

## 2025-05-18 RX ADMIN — QUETIAPINE 12.5 MG: 25 TABLET, FILM COATED ORAL at 21:15

## 2025-05-18 RX ADMIN — HYDRALAZINE HYDROCHLORIDE 10 MG: 10 TABLET ORAL at 21:15

## 2025-05-18 RX ADMIN — ACETAMINOPHEN 975 MG: 325 TABLET, FILM COATED ORAL at 13:42

## 2025-05-18 RX ADMIN — ROSUVASTATIN CALCIUM 5 MG: 5 TABLET, FILM COATED ORAL at 09:34

## 2025-05-18 RX ADMIN — HEPARIN SODIUM 5000 UNITS: 5000 INJECTION, SOLUTION INTRAVENOUS; SUBCUTANEOUS at 19:21

## 2025-05-18 RX ADMIN — ASPIRIN 81 MG CHEWABLE TABLET 162 MG: 81 TABLET CHEWABLE at 09:30

## 2025-05-18 RX ADMIN — POTASSIUM & SODIUM PHOSPHATES POWDER PACK 280-160-250 MG 1 PACKET: 280-160-250 PACK at 09:30

## 2025-05-18 RX ADMIN — Medication 400 MG: at 09:35

## 2025-05-18 RX ADMIN — HEPARIN SODIUM 5000 UNITS: 5000 INJECTION, SOLUTION INTRAVENOUS; SUBCUTANEOUS at 06:00

## 2025-05-18 RX ADMIN — SENNOSIDES AND DOCUSATE SODIUM 2 TABLET: 50; 8.6 TABLET ORAL at 09:33

## 2025-05-18 RX ADMIN — FUROSEMIDE 40 MG: 10 INJECTION, SOLUTION INTRAMUSCULAR; INTRAVENOUS at 09:30

## 2025-05-18 RX ADMIN — ACETAMINOPHEN 975 MG: 325 TABLET, FILM COATED ORAL at 21:15

## 2025-05-18 RX ADMIN — Medication 400 MG: at 13:43

## 2025-05-18 RX ADMIN — HYDRALAZINE HYDROCHLORIDE 10 MG: 10 TABLET ORAL at 15:12

## 2025-05-18 RX ADMIN — PANTOPRAZOLE SODIUM 40 MG: 40 TABLET, DELAYED RELEASE ORAL at 09:35

## 2025-05-18 RX ADMIN — CEFTRIAXONE SODIUM 1 G: 1 INJECTION, POWDER, FOR SOLUTION INTRAMUSCULAR; INTRAVENOUS at 13:43

## 2025-05-18 RX ADMIN — POLYETHYLENE GLYCOL 3350 17 G: 17 POWDER, FOR SOLUTION ORAL at 09:30

## 2025-05-18 ASSESSMENT — ACTIVITIES OF DAILY LIVING (ADL)
ADLS_ACUITY_SCORE: 54
ADLS_ACUITY_SCORE: 56
ADLS_ACUITY_SCORE: 59
ADLS_ACUITY_SCORE: 58
ADLS_ACUITY_SCORE: 54
ADLS_ACUITY_SCORE: 59
ADLS_ACUITY_SCORE: 59
ADLS_ACUITY_SCORE: 58
ADLS_ACUITY_SCORE: 54
ADLS_ACUITY_SCORE: 54
ADLS_ACUITY_SCORE: 58
ADLS_ACUITY_SCORE: 59
ADLS_ACUITY_SCORE: 59
ADLS_ACUITY_SCORE: 56
ADLS_ACUITY_SCORE: 58
ADLS_ACUITY_SCORE: 58
ADLS_ACUITY_SCORE: 54
ADLS_ACUITY_SCORE: 58
ADLS_ACUITY_SCORE: 54
ADLS_ACUITY_SCORE: 58
ADLS_ACUITY_SCORE: 56
ADLS_ACUITY_SCORE: 54
ADLS_ACUITY_SCORE: 54

## 2025-05-18 NOTE — PROGRESS NOTES
Care Management Follow Up    Length of Stay (days): 8    Expected Discharge Date: 05/18/2025     Concerns to be Addressed: discharge planning     Patient plan of care discussed at interdisciplinary rounds: Yes    Anticipated Discharge Disposition: Transitional Care              Anticipated Discharge Services:    Anticipated Discharge DME:      Patient/family educated on Medicare website which has current facility and service quality ratings: no  Education Provided on the Discharge Plan: Yes  Patient/Family in Agreement with the Plan: yes    Referrals Placed by CM/SW: Post Acute Facilities  Private pay costs discussed: Not applicable    Discussed  Partnership in Safe Discharge Planning  document with patient/family: No     Handoff Completed: No, handoff not indicated or clinically appropriate    Additional Information:  Writer assisting floor SW. Call placed to [facility] [place] to seek status of prior auth. Informed auth is still pending. Case ID: [id number]. Writer transfers to RN to do clinical over the phone.     Auth Dates: 5/18/25-5/27/25  Auth Number: [id number]    Next Steps:     SAMMY Lopez, LICSW  Social Work- Inpatient Care Coordination  [facility]

## 2025-05-18 NOTE — PROGRESS NOTES
Care Management Discharge Note    Discharge Date: 05/18/2025       Discharge Disposition: Transitional Care    Discharge Services: None    Discharge DME: Oxygen    Discharge Transportation: [facility] being billed as Wheelchair     Private pay costs discussed: transportation costs    Does the patient's insurance plan have a 3 day qualifying hospital stay waiver?  No    PAS Confirmation Code: [id number]  Patient/family educated on Medicare website which has current facility and service quality ratings: no    Education Provided on the Discharge Plan: Yes  Persons Notified of Discharge Plans: Patient liliane Torres   Patient/Family in Agreement with the Plan: yes    Handoff Referral Completed: No, handoff not indicated or clinically appropriate    Additional Information:  Patient will be discharging today to [facility] between 5082-0532 via [facility]. This will be billed as a wheelchair per [facility] at [facility]. Writer updated liliane Torres who was in agreement with the plan for discharge. Writer updated bedside RN on discharge time. Writer was then notified that patient is no longer medically stable for discharge and that discharge would need to be cancelled. Writer updated Elena with [facility]. Changed the ride to an [facility] Wheelchair ride for tomorrow between 4172-6048. Bedside RN was going to update family on cancelled discharge for today.     PAS-RR    D: Per DHS regulation, SW completed and submitted PAS-RR to [place] [employer] Direct Connect via the Senior [employer] Line.  PAS-RR confirmation # is : [id number]    P: Further questions may be directed to [employer] Line at #[phone], option #4 for PAS-RR staff.      MELY Bustamante

## 2025-05-18 NOTE — PROGRESS NOTES
Care Management Follow Up    Length of Stay (days): 8    Expected Discharge Date: 05/18/2025     Concerns to be Addressed: discharge planning     Patient plan of care discussed at interdisciplinary rounds: Yes    Anticipated Discharge Disposition: Transitional Care     Anticipated Discharge Services:    Anticipated Discharge DME:      Patient/family educated on Medicare website which has current facility and service quality ratings: no  Education Provided on the Discharge Plan: Yes  Patient/Family in Agreement with the Plan: yes    Referrals Placed by CM/SW: Post Acute Facilities  Private pay costs discussed: Not applicable    Discussed  Partnership in Safe Discharge Planning  document with patient/family: No     Handoff Completed: No, handoff not indicated or clinically appropriate    Additional Information:    Per Review of [employer], insurance authorization is still pending for TCU. Patient is medically stable for discharge     Message sent to Elena at [facility] to see if [facility] can accept patient today. Per Elena, they are able to accept patient today. SW Team is working on getting auth so patient may be able to discharge today    Next Steps: Care Management will continue to follow     MELY Bustamante

## 2025-05-18 NOTE — PLAN OF CARE
Vital signs stable on room air. Alert and oriented-3 overnight with some confusion. Lung sounds diminished. Bowel sounds active, flatus present. Sternal incision and chest tube sites clean dry and intact. Pain controlled with scheduled tylenol. Up assist of one walker. Tolerating diet with pills whole. CMS/Neuros intact, however very little energy this AM. Adequate rand output. Tele normal sinus rhythm

## 2025-05-19 ENCOUNTER — DOCUMENTATION ONLY (OUTPATIENT)
Dept: CARDIOLOGY | Facility: CLINIC | Age: 85
End: 2025-05-19
Payer: COMMERCIAL

## 2025-05-19 VITALS
WEIGHT: 132.94 LBS | DIASTOLIC BLOOD PRESSURE: 63 MMHG | TEMPERATURE: 98 F | RESPIRATION RATE: 16 BRPM | OXYGEN SATURATION: 98 % | BODY MASS INDEX: 26.85 KG/M2 | HEART RATE: 92 BPM | SYSTOLIC BLOOD PRESSURE: 129 MMHG

## 2025-05-19 DIAGNOSIS — I50.9 CHF (CONGESTIVE HEART FAILURE) (H): Primary | ICD-10-CM

## 2025-05-19 LAB
ANION GAP SERPL CALCULATED.3IONS-SCNC: 10 MMOL/L (ref 7–15)
BACTERIA UR CULT: ABNORMAL
BUN SERPL-MCNC: 28.8 MG/DL (ref 8–23)
CALCIUM SERPL-MCNC: 8.5 MG/DL (ref 8.8–10.4)
CHLORIDE SERPL-SCNC: 104 MMOL/L (ref 98–107)
CREAT SERPL-MCNC: 1.39 MG/DL (ref 0.51–0.95)
EGFRCR SERPLBLD CKD-EPI 2021: 37 ML/MIN/1.73M2
ERYTHROCYTE [DISTWIDTH] IN BLOOD BY AUTOMATED COUNT: 15.3 % (ref 10–15)
GLUCOSE SERPL-MCNC: 113 MG/DL (ref 70–99)
HCO3 SERPL-SCNC: 29 MMOL/L (ref 22–29)
HCT VFR BLD AUTO: 32.1 % (ref 35–47)
HGB BLD-MCNC: 10.7 G/DL (ref 11.7–15.7)
MAGNESIUM SERPL-MCNC: 1.8 MG/DL (ref 1.7–2.3)
MCH RBC QN AUTO: 30.1 PG (ref 26.5–33)
MCHC RBC AUTO-ENTMCNC: 33.3 G/DL (ref 31.5–36.5)
MCV RBC AUTO: 90 FL (ref 78–100)
PHOSPHATE SERPL-MCNC: 2.6 MG/DL (ref 2.5–4.5)
PLATELET # BLD AUTO: 351 10E3/UL (ref 150–450)
POTASSIUM SERPL-SCNC: 4.4 MMOL/L (ref 3.4–5.3)
RBC # BLD AUTO: 3.56 10E6/UL (ref 3.8–5.2)
SODIUM SERPL-SCNC: 143 MMOL/L (ref 135–145)
WBC # BLD AUTO: 15.3 10E3/UL (ref 4–11)

## 2025-05-19 PROCEDURE — 250N000011 HC RX IP 250 OP 636: Performed by: PHYSICIAN ASSISTANT

## 2025-05-19 PROCEDURE — 250N000013 HC RX MED GY IP 250 OP 250 PS 637: Performed by: PHYSICIAN ASSISTANT

## 2025-05-19 PROCEDURE — 85027 COMPLETE CBC AUTOMATED: CPT | Performed by: PHYSICIAN ASSISTANT

## 2025-05-19 PROCEDURE — 250N000013 HC RX MED GY IP 250 OP 250 PS 637: Performed by: NURSE PRACTITIONER

## 2025-05-19 PROCEDURE — 83735 ASSAY OF MAGNESIUM: CPT | Performed by: PHYSICIAN ASSISTANT

## 2025-05-19 PROCEDURE — 80048 BASIC METABOLIC PNL TOTAL CA: CPT | Performed by: PHYSICIAN ASSISTANT

## 2025-05-19 PROCEDURE — 84100 ASSAY OF PHOSPHORUS: CPT | Performed by: PHYSICIAN ASSISTANT

## 2025-05-19 PROCEDURE — 36415 COLL VENOUS BLD VENIPUNCTURE: CPT | Performed by: PHYSICIAN ASSISTANT

## 2025-05-19 RX ORDER — FUROSEMIDE 40 MG/1
40 TABLET ORAL DAILY
DISCHARGE
Start: 2025-05-19 | End: 2025-05-28

## 2025-05-19 RX ORDER — CEFTRIAXONE 1 G/1
1 INJECTION, POWDER, FOR SOLUTION INTRAMUSCULAR; INTRAVENOUS ONCE
Status: COMPLETED | OUTPATIENT
Start: 2025-05-19 | End: 2025-05-19

## 2025-05-19 RX ORDER — CEPHALEXIN 500 MG/1
500 CAPSULE ORAL EVERY 12 HOURS
DISCHARGE
Start: 2025-05-20 | End: 2025-05-30

## 2025-05-19 RX ORDER — FUROSEMIDE 10 MG/ML
40 INJECTION INTRAMUSCULAR; INTRAVENOUS ONCE
Status: COMPLETED | OUTPATIENT
Start: 2025-05-19 | End: 2025-05-19

## 2025-05-19 RX ORDER — ROSUVASTATIN CALCIUM 5 MG/1
5 TABLET, COATED ORAL DAILY
DISCHARGE
Start: 2025-05-20

## 2025-05-19 RX ORDER — CEPHALEXIN 500 MG/1
500 CAPSULE ORAL EVERY 12 HOURS SCHEDULED
Status: DISCONTINUED | OUTPATIENT
Start: 2025-05-20 | End: 2025-05-19 | Stop reason: HOSPADM

## 2025-05-19 RX ORDER — AMOXICILLIN 250 MG
1-2 CAPSULE ORAL 2 TIMES DAILY PRN
DISCHARGE
Start: 2025-05-19

## 2025-05-19 RX ORDER — ACETAMINOPHEN 325 MG/1
650 TABLET ORAL EVERY 4 HOURS PRN
DISCHARGE
Start: 2025-05-19

## 2025-05-19 RX ORDER — METOPROLOL SUCCINATE 50 MG/1
50 TABLET, EXTENDED RELEASE ORAL DAILY
DISCHARGE
Start: 2025-05-20

## 2025-05-19 RX ORDER — QUETIAPINE FUMARATE 25 MG/1
12.5 TABLET, FILM COATED ORAL AT BEDTIME
DISCHARGE
Start: 2025-05-19 | End: 2025-05-28

## 2025-05-19 RX ORDER — FUROSEMIDE 40 MG/1
40 TABLET ORAL DAILY
DISCHARGE
Start: 2025-05-19 | End: 2025-05-19

## 2025-05-19 RX ORDER — ASPIRIN 81 MG/1
162 TABLET, COATED ORAL DAILY
DISCHARGE
Start: 2025-05-19

## 2025-05-19 RX ADMIN — PANTOPRAZOLE SODIUM 40 MG: 40 TABLET, DELAYED RELEASE ORAL at 08:31

## 2025-05-19 RX ADMIN — ENALAPRIL MALEATE 20 MG: 10 TABLET ORAL at 08:31

## 2025-05-19 RX ADMIN — ACETAMINOPHEN 975 MG: 325 TABLET, FILM COATED ORAL at 06:45

## 2025-05-19 RX ADMIN — FUROSEMIDE 40 MG: 10 INJECTION, SOLUTION INTRAMUSCULAR; INTRAVENOUS at 08:30

## 2025-05-19 RX ADMIN — HYDRALAZINE HYDROCHLORIDE 10 MG: 10 TABLET ORAL at 08:31

## 2025-05-19 RX ADMIN — ROSUVASTATIN CALCIUM 5 MG: 5 TABLET, FILM COATED ORAL at 08:31

## 2025-05-19 RX ADMIN — ASPIRIN 81 MG CHEWABLE TABLET 162 MG: 81 TABLET CHEWABLE at 08:30

## 2025-05-19 RX ADMIN — CEFTRIAXONE SODIUM 1 G: 1 INJECTION, POWDER, FOR SOLUTION INTRAMUSCULAR; INTRAVENOUS at 11:47

## 2025-05-19 RX ADMIN — HEPARIN SODIUM 5000 UNITS: 5000 INJECTION, SOLUTION INTRAVENOUS; SUBCUTANEOUS at 06:45

## 2025-05-19 RX ADMIN — METOPROLOL SUCCINATE 50 MG: 50 TABLET, FILM COATED, EXTENDED RELEASE ORAL at 08:30

## 2025-05-19 ASSESSMENT — ACTIVITIES OF DAILY LIVING (ADL)
ADLS_ACUITY_SCORE: 56
ADLS_ACUITY_SCORE: 51
ADLS_ACUITY_SCORE: 56
ADLS_ACUITY_SCORE: 51
ADLS_ACUITY_SCORE: 52

## 2025-05-19 NOTE — PROVIDER NOTIFICATION
MD Notification    Notified Person: MD    Notified Person Name: Minesh    Notification Date/Time: 1700 5/18/25    Notification Interaction: aurelio    Purpose of Notification: Hello, patient jacqui recently came up to the floor and theres no code status order and he needs a WOC order for his I&D incision on his bottom and near his umbilicus is an ulcer type abrasion    Orders Received: WOC ordered placed, no answer about code status    Comments:    Subjective:   Patient ID:    Name: Ilya Guevara  : 1947  MRN: [id number]    HPI:   Ilya Guevara jimmy at CHRISTUS St. Vincent Physicians Jozef Velasquez MD;  Location: [facility] Jozef Velasquez MD;  Location: [facility] Jozef Velasquez MD;  Location: [facility] [facility] and Dr Velasquez   - Rani Baeza MD  Hematology and Oncology  [facility]        

## 2025-05-19 NOTE — PLAN OF CARE
Occupational Therapy Discharge Summary    Reason for therapy discharge:    Discharged to transitional care facility.    Progress towards therapy goal(s). See goals on Care Plan in [facility] electronic health record for goal details.  Goals partially met.  Barriers to achieving goals:   limited tolerance for therapy and discharge from facility.    Therapy recommendation(s):    Continued therapy is recommended.  Rationale/Recommendations:  Prior to admit, pt independent with ADL/IADL's and functional mobiity no AD. Pt currently limited due to impaired cognition, impaired strength, activity tolerance, sternal precautions and pain. Currently pt requires assist in all I/ADLs and functional mobility and transfers. TCU to address safety and IND in I/ADLs.

## 2025-05-19 NOTE — DISCHARGE SUMMARY
Discharge Summary    Mikayla Sotomayor MRN# [id number]   YOB: 1940 Age: 84 year old     Date of Admission:  5/10/2025  Date of Discharge:  5/19/2025  2:15 PM  Admitting Physician:  Constantin Ovalle MD  Discharge Physician:  Dr. Roberto Guzman  Discharging Service:  Cardiovascular and Thoracic Surgery     Home clinic: [facility]  Primary Provider: Paige Higginbotham          Admission Diagnoses:   ST elevation MI (STEMI) (H) [I21.3]  Status post coronary angiogram [Z98.890]          Discharge Diagnosis:     NSTEMI/multivessel CAD s/p CABG  Hypertension   Dyslipidemia  Acute systolic heart failure  Postoperative delirium  ZANE on CKD stage 3  Urinary retention  UTI  DMT2, diet-controlled             Discharge Disposition:     Discharged to rehabilitation facility           Condition on Discharge:     Discharge condition: Stable   Discharge vitals: Blood pressure 129/63, pulse 92, temperature 98  F (36.7  C), temperature source Oral, resp. rate 16, weight 60.3 kg (132 lb 15 oz), SpO2 98%.   Code status on discharge: Full Code           Procedures:     On 05/11/2025 Ms. Sotomayor underwent coronary artery bypass grafting x2 (LIMA to LAD and SVG to OM) with Dr. Roberto Guzman.           Medications Prior to Admission:     Medications Prior to Admission   Medication Sig Dispense Refill Last Dose/Taking    ASPIRIN 81 MG OR TABS Take 81 mg by mouth 2 times daily. 100 3 5/10/2025    Calcium-Magnesium-Vitamin D (CALCIUM MAGNESIUM PO) Take 1 tablet by mouth daily.   5/9/2025 Morning    Cyanocobalamin (B-12) 1000 MCG TBCR Take 1,000 mcg by mouth daily 100 tablet 1 5/9/2025 Morning    enalapril (VASOTEC) 20 MG tablet Take 1 tablet (20 mg) by mouth 2 times daily. 180 tablet 3 5/9/2025 Morning    hydrALAZINE (APRESOLINE) 10 MG tablet Take 1 tablet (10 mg) by mouth 3 times daily. 90 tablet 1 Taking    hydrochlorothiazide (HYDRODIURIL) 25 MG tablet TAKE 1/2 TABLET BY MOUTH DAILY 45 tablet 3 5/9/2025 Morning     hydrOXYzine HCl (ATARAX) 10 MG tablet Take 1 tablet (10 mg) by mouth 2 times daily as needed for itching or anxiety. 60 tablet 1 Taking As Needed    Multiple Vitamins-Minerals (MULTIVITAMIN & MINERAL) LIQD Take 2 oz by mouth daily.   5/9/2025 Morning    VITAMIN D, CHOLECALCIFEROL, PO Take 5 drops by mouth daily.   Past Month             Discharge Medications:        Review of your medicines        UNREVIEWED medicines. Ask your doctor about these medicines        Dose / Directions   aspirin 81 MG tablet  Commonly known as: ASA      Dose: 81 mg  Take 81 mg by mouth 2 times daily.  Quantity: 100  Refills: 3     B-12 1000 MCG Tbcr      Dose: 1,000 mcg  Take 1,000 mcg by mouth daily  Quantity: 100 tablet  Refills: 1     CALCIUM MAGNESIUM PO      Dose: 1 tablet  Take 1 tablet by mouth daily.  Refills: 0     enalapril 20 MG tablet  Commonly known as: VASOTEC  Used for: BENIGN HYPERTENSION      Dose: 20 mg  Take 1 tablet (20 mg) by mouth 2 times daily.  Quantity: 180 tablet  Refills: 3     hydrALAZINE 10 MG tablet  Commonly known as: APRESOLINE  Used for: Essential hypertension, benign      Dose: 10 mg  Take 1 tablet (10 mg) by mouth 3 times daily.  Quantity: 90 tablet  Refills: 1     hydrochlorothiazide 25 MG tablet  Commonly known as: HYDRODIURIL  Used for: BENIGN HYPERTENSION      TAKE 1/2 TABLET BY MOUTH DAILY  Quantity: 45 tablet  Refills: 3     hydrOXYzine HCl 10 MG tablet  Commonly known as: ATARAX  Used for: Anxiety      Dose: 10 mg  Take 1 tablet (10 mg) by mouth 2 times daily as needed for itching or anxiety.  Quantity: 60 tablet  Refills: 1     Multivitamin & Mineral Liqd      Dose: 2 oz  Take 2 oz by mouth daily.  Refills: 0     VITAMIN D (CHOLECALCIFEROL) PO      Dose: 5 drop  Take 5 drops by mouth daily.  Refills: 0                     Consultations:     Nutrition, Intensivist, Cardiology             Brief History of Illness:     Mikayla Sotomayor is a 84 year old female with a PMH of hypertension,  dyslipidemia, DMT2, and CKD stage 3 who was admitted to [facility] 05/09 with one month history of right arm pain which progressed to resting chest pain and found to have NSTEMI. HS troponin peal 2844 and not trended further. TTE 05/10 with EF 35% with multiple WMA, mild MR, and mild TR. Transferred to [facility] 05/10 for urgent angiogram which demonstrated critical 99% LMCA stenosis, IABP placed, and CV surgery consulted for consideration of surgical revascularization. Underwent urgent CABG x3 (LIMA to LAD, SVG to OM2) on 05/11 with Dr. Guzman.           Hospital Course:     She tolerated the surgery well and was weaned off vasoactive infusions by POD1. She was initiated on beta blocker prior to discharge. Heart rhythm remained stable. She had some fluid overload treated with diuretic medication. At discharge she is above her pre-op weight, appears mildly hypervolemic, and sent with 7 days of PO Lasix. Instructed to trend daily weight and blood pressure until cardiac surgery follow up appointment. As above diagnosed with acute systolic heart failure this admission, CORE cardiology follow-up arranged    She was extubated on POD1 and weaned off supplemental O2 following institution of pulmonary toilet and diuresis.     She has a history of diet-controlled DMT2 and had some transient hyperglycemia which was initially treated with an insulin infusion, then transitioned to sliding scale insulin, and has no insulin need at discharge.    She had a rand catheter placed intraoperatively which was removed on POD2. Noted to have intermittent urinary retention after follow removal, and rand was replaced on POD4. On POD7 she was noted to have increasing WBC. UA consistent with infection and she was started on antibiotics and Rand removed. No further retention noted following Rand removal, recommend PRN bladder scanning at TCU. Discharged with 5 day course of PO antibiotics for UTI.     Per family, she has some baseline memory issues.  "Postoperatively noted to have intermittent confusion consistent with delirium. She improved to baseline mentation by the time of discharge with the addition of low dose Seroquel at .     By the time of discharge of she had full return of bowel and bladder function, her pain was adequately controlled, she progressed with therapies but remained well below her functional baseline and is discharged to TCU on POD8 with the help of her family.              Significant Results:     Lab Results   Component Value Date    WBC 15.3 05/19/2025    WBC 7.8 01/07/2019     Lab Results   Component Value Date    RBC 3.56 05/19/2025    RBC 4.63 01/07/2019     Lab Results   Component Value Date    HGB 10.7 05/19/2025    HGB 13.7 01/07/2019     Lab Results   Component Value Date    HCT 32.1 05/19/2025    HCT 43.4 01/07/2019     No components found for: \"MCT\"  Lab Results   Component Value Date    MCV 90 05/19/2025    MCV 94 01/07/2019     Lab Results   Component Value Date    MCH 30.1 05/19/2025    MCH 29.6 01/07/2019     Lab Results   Component Value Date    MCHC 33.3 05/19/2025    MCHC 31.6 01/07/2019     Lab Results   Component Value Date    RDW 15.3 05/19/2025    RDW 14.5 01/07/2019     Lab Results   Component Value Date     05/19/2025     01/07/2019       Last Basic Metabolic Panel:  Lab Results   Component Value Date     05/19/2025     10/01/2018      Lab Results   Component Value Date    POTASSIUM 4.4 05/19/2025    POTASSIUM 3.4 05/11/2025    POTASSIUM 4.9 10/01/2018     Lab Results   Component Value Date    CHLORIDE 104 05/19/2025    CHLORIDE 102 10/01/2018     Lab Results   Component Value Date    LISA 8.5 05/19/2025    LISA 10.0 10/01/2018     Lab Results   Component Value Date    CO2 29 05/19/2025    CO2 24 10/01/2018     Lab Results   Component Value Date    BUN 28.8 05/19/2025    BUN 26 10/01/2018    BUN 21 10/01/2018     Lab Results   Component Value Date    CR 1.39 05/19/2025    CR 1.26 " 10/01/2018     Lab Results   Component Value Date     05/19/2025     05/15/2025     10/01/2018    GLC 95 07/11/2013                  Pending Results:     None           Discharge Instructions and Follow-Up:     Discharge diet: Regular   Discharge activity: Daily weights: Call if weight gain 2-3 lbs over 24 hours or if greater than 5 lbs in 1 week.  No lifting more than 10 lbs for 8-12 weeks.  No driving for 1 month.  Call for pain medication refill.  Call for temperature greater than 101.0  Daily shower with antibacterial soap.   Discharge follow-up: CV Surgery [facility] Practitioners on 06/02/2025 at 1:45 PM the Heart Clinic at [facility].    Primary Care Doctor, Sienna Higginbotham NP, on 06/03/2025 at 9:00 AM at [facility].  Cardiology, Thiago Knox NP, on 05/30/2025 at the [facility] [place] [facility].  Cardiology, Dr. Hartley, on 08/01/2025 at 2:45 PM at the [facility] [place] [facility].   Outpatient therapy: Cardiac rehab   Home Care agency: None    Supplies and equipment: None   Lines and drains: None    Wound care: Wash incision daily with antibacterial soap   Other instructions: None      Grecia Mckeon PA-C  Cardiothoracic Surgery  Pager [phone]

## 2025-05-19 NOTE — PLAN OF CARE
Vital signs stable on room air. Alert and oriented-3 overnight with. Lung sounds diminished. Bowel sounds active, flatus present. Sternal incision and chest tube sites clean dry and intact. Pain controlled with scheduled tylenol. Up assist of one walker. Tolerating diet with pills whole. CMS/Neuros intact. Adequate purewick output. Tele normal sinus rhythm. Plan for TCU today.

## 2025-05-19 NOTE — PLAN OF CARE
"Patient Name: Juana  MRN: [id number]  Date of Admission: 5/10/2025  Reason for Admission: CABG  Level of Care: acute    Vitals:   BP Readings from Last 1 Encounters:  05/18/25 : 132/59    Pulse Readings from Last 1 Encounters:  05/18/25 : 79    Wt Readings from Last 1 Encounters:  05/18/25 : 65 kg (143 lb 4.8 oz)    Ht Readings from Last 1 Encounters:  05/09/25 : 1.499 m (4' 11\")    Estimated body mass index is 28.94 kg/m  as calculated from the following:    Height as of 5/9/25: 1.499 m (4' 11\").    Weight as of this encounter: 65 kg (143 lb 4.8 oz).  Temp Readings from Last 1 Encounters:  05/18/25 : 98.7  F (37.1  C) (Axillary)      Pain: denies    CV Surgery Patient: Yes Post Op Day #: 7    Assessment    General: Afebrile yes  Rhythm: normal sinus rhythm  Blood Pressure Medications given/held:  Beta blocker Given Other hydralazine, enalapril   Resp: Oxygen Status: RA  Patient slept last night Yes Approx hours slept 5  Incentive Spirometry Q 1-2 hour when awake: yes Volume: 750  Neuro: Alert yes Orientation: self and person Disoriented to time and situation   GI/:          Bowel Activity: yes if yes indicate when: 5/18/25          Bowel Medications: yes          Urinary Catheter: no  Skin:          Incision: Incision status: healing well and opened          Epicardial Pacing Wires: no  Chest Tubes: none     Assessment    Resp: CHIN, RA, IS encouraged  Telemetry: SE  Neuro: disoriented to situation and time  GI/: incontinent   Skin/Wounds: moles scattered, bruising scattered  Lines/Drains: PIV SL  Activity: A1/GB/W/pivot   Abnormal Labs: k/mg/phos     Aggression Stop Light: Green          Patient Care Plan: discharged cancelled today d/t UA coming back positive, urine cultures pending, rand taken out d/t UA and pure wick place d/t incontinence. Prior authorization cleared- see ELIZABETH notes  "

## 2025-05-19 NOTE — PROGRESS NOTES
Care Management Follow Up    Length of Stay (days): 9    Expected Discharge Date: 05/19/2025     Concerns to be Addressed: discharge planning     Patient plan of care discussed at interdisciplinary rounds: Yes    Anticipated Discharge Disposition: Transitional Care  Anticipated Discharge Services: None  Anticipated Discharge DME: Oxygen    Patient/family educated on Medicare website which has current facility and service quality ratings: no  Education Provided on the Discharge Plan: Yes  Patient/Family in Agreement with the Plan: yes    Referrals Placed by CM/SW: Post Acute Facilities, Transportation, Senior Linkage Line  Private pay costs discussed: Not applicable    Discussed  Partnership in Safe Discharge Planning  document with patient/family: No     Handoff Completed: No, handoff not indicated or clinically appropriate    Additional Information:  SW received BC[employer] authorization Fax: [id number] approved for 10 days     Next Steps: Discharge orders. Update team on authorization confirmation.     LINDA DicksonW

## 2025-05-19 NOTE — PLAN OF CARE
".Patient Name: Juana  MRN: [id number]  Date of Admission: 5/10/2025  Reason for Admission: Stemi; CABG x 2.    Level of Care: [facility]    Vitals:   BP Readings from Last 1 Encounters:  05/19/25 : 129/63    Pulse Readings from Last 1 Encounters:  05/19/25 : 92    Wt Readings from Last 1 Encounters:  05/19/25 : 60.3 kg (132 lb 15 oz)    Ht Readings from Last 1 Encounters:  05/09/25 : 1.499 m (4' 11\")    Estimated body mass index is 26.85 kg/m  as calculated from the following:    Height as of 5/9/25: 1.499 m (4' 11\").    Weight as of this encounter: 60.3 kg (132 lb 15 oz).  Temp Readings from Last 1 Encounters:  05/19/25 : 98  F (36.7  C) (Oral)      Pain: Pain goal 0 Pain Rating 2 Effective pain medication/regimen scheduled tylenol.    CV Surgery Patient: Yes Post Op Day #: 8    Assessment    General: Afebrile yes  Rhythm: normal sinus rhythm  Blood Pressure Medications given/held: Amio Held Beta blocker Given Other   Resp: Oxygen Status: RA  Patient slept last night Yes Approx hours slept 6  Incentive Spirometry Q 1-2 hour when awake: yes Volume: 1000  Neuro: Alert yes Orientation: self, person, and place  GI/:          Bowel Activity: yes if yes indicate when: 5/19/25          Bowel Medications: no          Urinary Catheter: no  Skin:          Incision: Incision status: healing well          Epicardial Pacing Wires: no  Chest Tubes   Pleural: no Draining: no               Suction: no              Mediastinal: no Draining: no               Suction: no   Dressing Change Daily: no If no, why? JUDAH      Assessment    Resp: 18  Telemetry: NSR w/ ST abnormality  Neuro: Intact  GI/: BM+; Voiding adequately.  Skin/Wounds: Sternal, LLE harvest sites, JUDAH.  Lines/Drains: [id number]  Activity: Assist x 1 gbw  Sleep: n/a  Abnormal Labs: n/a    Aggression Stop Light: Green          Patient Care Plan: Pt is discharging to a TCU this afternoon.  VSS on RA; keep SBP below 140.  Regular diet.  CTM.                    "

## 2025-05-19 NOTE — CONSULTS
[facility]    Received CORE Clinic Consult from Bridget Jolley NP.     Reviewed Ms. Sotomayor's chart and note they are admitted for arm/chest pain with nausea, vomiting, diarrhea in setting of NSTEMI and subsequent CABG x2 on 5/11/25. Echocardiogram shows LVEF 35% pre-CABG. This is their first admission in the past year for concerns of heart failure.    With LVEF 35%, Ms. Sotelo does meet criteria for CORE enrollment.     CORE Enrollment follow up arranged, see below. Patient discharging to TCU. Patient not appropriate for Education at this time due to delirium.       Future Appointments   Date Time Provider Department Center   5/20/2025  7:00 AM Karey Garcia APRN CNP [facility]   5/30/2025  9:00 AM RU LAB [facility] [place] RID   5/30/2025 10:00 AM Iggy Knox NP [facility] PSA CLIN   6/2/2025  1:45 PM [facility] CARDIOTHORACIC SURGERY, GRACE SCHWARZ [place] INGRID   6/3/2025  9:00 AM Paige Higginbotham APRN CNP [facility] CR   6/3/2025  2:30 PM  CARDIAC REHAB 5 [facility] [place] RID   8/1/2025  2:45 PM Tyree Harltey MD [facility] PSA CLIN     Please call with questions.      Mely Whitehead RN BSN  CORE Clinic RN Care Coordinator   [facility]   [phone]   CORE Clinic: Cardiomyopathy, Optimization, Rehabilitation, Education

## 2025-05-19 NOTE — PROGRESS NOTES
[facility]  Cardiovascular and Thoracic Surgery Daily Note    Assessment and Plan  Mikayla Sotomayor is a 84 year old female with a PMH of hypertension, dyslipidemia, DMT2, and CKD stage 3 who was admitted to [facility] 05/09 with one month history of right arm pain which progressed to resting chest pain and found to have NSTEMI. HS troponin peal 2844 and not trended further. TTE 05/10 with EF 35% with multiple WMA, mild MR, and mild TR. Transferred to [facility] 05/10 for urgent angiogram which demonstrated critical 99% LMCA stenosis, IABP placed, and CV surgery consulted for consideration of surgical revascularization. Underwent urgent CABG x3 (LIMA to LAD, SVG to OM2) on 05/11 with Dr. Guzman.     POD # 8 s/p coronary artery bypass grafting x2 (LIMA to LAD and SVG to OM) on 5/11/25 with Dr. Roberto Guzman.     - CVS:   Pre-op TTE with LVEF 35%, grade I diastolic dysfunction and preserved RV function. 1-2+ MR, 1-2+ TR. TTE 05/16 shows EF improved to 50-55% postoperatively.   Postop mixed vasoplegic and cardiogenic shock, resolved. Off pressor/inotropic support POD1. IABP removed intraoperatively.  BP better controlled. Continue Toprol 50 mg daily,  PTA hydralazine 10 mg TID, enalapril to 20 mg daily now that renal function back to baseline.   Aspirin 162 mg daily, continue rosuvastatin 5 mg daily and up titrate as able (new med for patient)  Optimize GDMT as able. CORE consult placed 5/12/25 and scheduled at discharge with CORE.   Hypervolemic, improving. Repeat lasix 40 mg IV x1 today. Will discharge on 7 day course of Lasix.   Chest tubes and TPWs removed POD4  Wound vac removed POD4  PTA meds on hold: Hydrodiuril     - Resp: Postoperative mechanical ventilation, resolved. Extubated pod#1. Encourage IS, pulm hygiene    - Neuro:   No focal neurologic deficits. Ongoing generalized weakness.  Postop delirium, improving. Per son, has baseline memory issues. Continue delirium precautions. Continue 12.5 mg seroquel at  HS (QTc 446 ms).   Pain controlled on current regimen. Narcotics discontinued.      - Renal: ZANE (resolved) on CKD stage 3. BL Cr appears to be ~1.6. Trend BMP. Volume management as above.  Recent Labs   Lab 05/18/25  0605 05/17/25  0607 05/16/25  0641   CR 1.44* 1.49* 1.61*       - GI: + BM, + flatus, continue bowel regimen. Dysphagia resolved, diet advanced.     - : Rand removed POD2, voiding well on own but some retention. Rand replaced 05/15 due to high residuals. UA 5/16 not suggestive of infection. Increasing WBC, repeat UA 05/18 consistent with infection, rand removed 05/18 and voiding without issue.     - Endo: DMT2, diet-controlled. Postop stress hyperglycemia. Insulin infusion, transitioned to sliding scale insulin, now discontinued.  Hemoglobin A1C   Date Value Ref Range Status   05/10/2025 6.0 (H) <5.7 % Final     Comment:     Normal <5.7%   Prediabetes 5.7-6.4%    Diabetes 6.5% or higher     Note: Adopted from ADA consensus guidelines.   01/07/2019 6.9 (H) 0 - 5.6 % Final     Comment:     Normal <5.7% Prediabetes 5.7-6.4%  Diabetes 6.5% or higher - adopted from ADA   consensus guidelines.          - FEN: Replace electrolytes as needed. Tolerating diet, on calorie counts per dietician. Regular diets, thin liquids.      - ID: Postop leukocytosis, likely reactive from surgery, now UTI. Afebrile. WBC down-trending, UA 05/18 suggestive of infection, UC with gram negative  bacilli. Started ceftriaxone 1 g q24H for UTI, will transition to Keflex at discharge. Periop abx prophylaxis completed. UA 05/16 not suggestive of infection. Trend CBC and fever curve.   Recent Labs   Lab 05/19/25  0614 05/18/25  0605 05/17/25  0607   WBC 15.3* 16.3* 12.5*       - Heme: Acute blood loss anemia and thrombocytopenia due to surgery. Hgb and PLT stable. Trend CBC, transfuse PRN. Transfused 1U PRBc 5/13 am.  Recent Labs   Lab 05/19/25  0614 05/18/25  0605 05/17/25  0607   HGB 10.7* 10.9* 10.4*    295 217       -  "Proph: SCD, subcutaneous heparin, PPI    - Other:  Clinically Significant Risk Factors               # Hypoalbuminemia: Lowest albumin = 3.3 g/dL at 5/11/2025  9:35 PM, will monitor as appropriate       # Hypertension: Noted on problem list     # Acute Hypoxic Respiratory Failure: Based on blood gas results. Continue supplemental oxygen as needed         # Overweight: Estimated body mass index is 26.85 kg/m  as calculated from the following:    Height as of 5/9/25: 1.499 m (4' 11\").    Weight as of this encounter: 60.3 kg (132 lb 15 oz).       # History of CABG: noted on surgical history       - Dispo: IMC. Therapies recommending TCU at discharge. Ready for discharge today.        Interval History  Eager for discharge. Working with therapies. Walked to the bathroom today. Alert and oriented.       Medications  Current Facility-Administered Medications   Medication Dose Route Frequency Provider Last Rate Last Admin    acetaminophen (TYLENOL) tablet 975 mg  975 mg Oral Q8H Flower Baeza PA-C   975 mg at 05/19/25 0645    aspirin (ASA) chewable tablet 162 mg  162 mg Oral or NG Tube Daily Flower Baeza PA-C   162 mg at 05/18/25 0930    cefTRIAXone (ROCEPHIN) 1 g vial to attach to  mL bag for ADULTS or NS 50 mL bag for PEDS  1 g Intravenous Q24H Grecia Mckeon PA-C   1 g at 05/18/25 1343    enalapril (VASOTEC) tablet 20 mg  20 mg Oral Daily Grecia Mckeon PA-C        heparin ANTICOAGULANT injection 5,000 Units  5,000 Units Subcutaneous Q12H Flower Baeza PA-C   5,000 Units at 05/19/25 0645    hydrALAZINE (APRESOLINE) tablet 10 mg  10 mg Oral TID Grecia Mckeon PA-C   10 mg at 05/18/25 2115    metoprolol succinate ER (TOPROL XL) 24 hr tablet 50 mg  50 mg Oral Daily Bridget Jolley NP   50 mg at 05/18/25 0934    pantoprazole (PROTONIX) 2 mg/mL suspension 40 mg  40 mg Oral or NG Tube Daily Flower Baeza PA-C   40 mg at 05/12/25 0821    Or    pantoprazole (PROTONIX) EC tablet 40 mg  40 mg " Oral Daily Flower Baeza PA-C   40 mg at 05/18/25 0935    polyethylene glycol (MIRALAX) Packet 17 g  17 g Oral Daily Flower Baeza PA-C   17 g at 05/18/25 0930    QUEtiapine (SEROquel) half-tab 12.5 mg  12.5 mg Oral At Bedtime Bridget Jolley NP   12.5 mg at 05/18/25 2115    rosuvastatin (CRESTOR) tablet 5 mg  5 mg Oral Daily Flower Baeza PA-C   5 mg at 05/18/25 0934    senna-docusate (SENOKOT-S/PERICOLACE) 8.6-50 MG per tablet 2 tablet  2 tablet Oral BID Flower Baeza PA-C   2 tablet at 05/18/25 0933    sodium chloride (PF) 0.9% PF flush 3 mL  3 mL Intracatheter Q8H Flower Baeza PA-C   3 mL at 05/19/25 0646     Current Facility-Administered Medications   Medication Dose Route Frequency Provider Last Rate Last Admin    acetaminophen (TYLENOL) tablet 650 mg  650 mg Oral Q4H PRN Flower Baeza PA-C   650 mg at 05/11/25 1357    bisacodyl (DULCOLAX) suppository 10 mg  10 mg Rectal Daily PRN Flower Baeza PA-C        glucose gel 15-30 g  15-30 g Oral Q15 Min PRN Flower Baeza PA-C        Or    dextrose 50 % injection 25-50 mL  25-50 mL Intravenous Q15 Min PRN Flower Baeza PA-C        Or    glucagon injection 1 mg  1 mg Subcutaneous Q15 Min PRN Flower Baeza PA-C        hydrALAZINE (APRESOLINE) injection 10 mg  10 mg Intravenous Q30 Min PRN Flower Baeza PA-C   10 mg at 05/17/25 2042    lactated ringers BOLUS 500 mL  500 mL Intravenous Once PRN Flower Baeza PA-C        lidocaine (LMX4) cream   Topical Q1H PRN Flower Baeza PA-C        lidocaine 1 % 0.1-1 mL  0.1-1 mL Other Q1H PRN Flower Baeza PA-C        magnesium hydroxide (MILK OF MAGNESIA) suspension 30 mL  30 mL Oral Daily PRN Flower Baeza PA-C        naloxone (NARCAN) injection 0.2 mg  0.2 mg Intravenous Q2 Min PRN Flower Baeza PA-C        Or    naloxone (NARCAN) injection 0.4 mg  0.4 mg Intravenous Q2 Min PRN Flower Baeza PA-C        Or    naloxone (NARCAN)  injection 0.2 mg  0.2 mg Intramuscular Q2 Min PRN Flower Baeza PA-C        Or    naloxone (NARCAN) injection 0.4 mg  0.4 mg Intramuscular Q2 Min PRN Flower Baeza PA-C        ondansetron (ZOFRAN ODT) ODT tab 4 mg  4 mg Oral Q6H PRN Flower Baeza PA-C        Or    ondansetron (ZOFRAN) injection 4 mg  4 mg Intravenous Q6H PRN Flower Baeza PA-C   4 mg at 05/14/25 0913    sodium chloride (PF) 0.9% PF flush 3 mL  3 mL Intracatheter q1 min prn Flower Baeza PA-C        sodium chloride 0.9% BOLUS 1-250 mL  1-250 mL Intravenous Q1H PRN Flower Baeza PA-C             Physical Exam  Vitals were reviewed  Blood pressure (!) 144/77, pulse 85, temperature 98  F (36.7  C), temperature source Oral, resp. rate 12, weight 60.3 kg (132 lb 15 oz), SpO2 98%.    Rhythm: NSR    Lungs: CTA    Cardiovascular: Distant, S1, S2, RRR, no m/r/g    Abdomen: soft, NT, ND     Extremeties: warm, trace to 1+ BLE edema    Incision: CDI.     CT: N/A    Weight:   Vitals:    05/14/25 0306 05/15/25 0606 05/16/25 0436 05/18/25 0500   Weight: 68.2 kg (150 lb 5.7 oz) 66.4 kg (146 lb 6.2 oz) 65.4 kg (144 lb 2.9 oz) 65 kg (143 lb 4.8 oz)    05/19/25 0500   Weight: 60.3 kg (132 lb 15 oz)         Data  Recent Labs   Lab 05/19/25  0614 05/18/25  0605 05/17/25  0607 05/16/25  1259 05/16/25  0641   WBC 15.3* 16.3* 12.5*  --  11.8*   HGB 10.7* 10.9* 10.4*  --  10.1*   MCV 90 91 91  --  88    295 217  --  178   NA  --  143 142  --  139   POTASSIUM  --  4.1 3.6 3.8 3.3*   CHLORIDE  --  106 108*  --  105   CO2  --  27 25  --  24   BUN  --  31.2* 35.3*  --  40.1*   CR  --  1.44* 1.49*  --  1.61*   ANIONGAP  --  10 9  --  10   LISA  --  8.4* 8.1*  --  8.2*   GLC  --  128* 106*  --  148*       Imaging:  Recent Results (from the past 24 hours)   XR Chest Port 1 View    Narrative    EXAM: XR CHEST PORT 1 VIEW  LOCATION: [facility]  DATE: 5/18/2025    INDICATION: leuckocytosis, eval  infiltrate  COMPARISON: 5/14/2025      Impression    IMPRESSION: Interval removal of the mediastinal/chest tubes. Small left and trace right pleural effusions with associated atelectasis. No pneumothorax. Heart size is stable. CABG changes and sternotomy wires.             Patient seen and discussed with Dr. Mulvihill.     Grecia Mckeon PA-C  Cardiothoracic Surgery  Available for paging [phone] (personal pager or CV Surgery Rounding Pager)  Personal Pager: [phone]  CV Surgery Rounding Pager: [phone]  After hours please page surgeon on-call

## 2025-05-19 NOTE — DISCHARGE INSTRUCTIONS
AFTER YOU GO HOME FROM YOUR HEART SURGERY  (Coronary Artery Bypass Graft Surgery on 05/11/2025 with Dr. Roberto Guzman)    You had a sternotomy, avoid lifting, pushing, or pulling anything greater than ten pounds for 8 weeks after surgery and then less than 20-30 pounds for an additional 4 weeks. Do not reach backwards or use arms to push out of chair. Do not let people pull on your arms to assist with standing. Avoid twisting or reaching too far across your body.  Avoid strenuous activities such as bowling, vacuuming, raking, shoveling, golf or tennis for 12 weeks after your surgery. Splint your chest incision by hugging a pillow or bringing your arms across your chest when coughing or sneezing. Please try to sleep on your back for the first 4-6 weeks to avoid extra stress on your sternum (breastbone) while it is healing.     No driving for 4 weeks after surgery or while on pain medication.     Shower or wash your incisions daily with antibacterial soap and water (or as instructed), pat dry. Keep wound clean and dry. No baths or swimming for 6 weeks and/or until incisions are completely healed over. Cover chest tube sites with gauze or a Bandaid until they stop draining, then leave open to air. It is normal for chest tube sites to drain fluid for up to 2-3 weeks after surgery. It is not normal to have drainage from other incisions.     Watch for signs of infection: increased redness, tenderness, warmth or any drainage from sternum and leg incisions.  Also a temperature > 100.5 F or chills. Call your surgeon or primary care provider's office immediately.     Exercise is very important in your recovery. Please follow the guidelines set up for you in your cardiac rehab classes at the hospital. If outpatient cardiac rehab was ordered for you, we highly recommend you participate. If you have problems arranging your cardiac rehab, please call [phone] for all locations, with the exception of [place], please call  [phone] and [place], please call [phone].    Avoid sitting for prolonged periods of time, try to walk every hour during the day. If you have a leg incision, elevate your leg often when you are not walking.  Your goal is to work up to 30 minutes of physical activity per day.     Check your weight when you get home from the hospital and continue to check it daily through your recovery for at least a month. If you notice a weight gain of 2-3 pounds overnight, or 5 pounds in a week, notify your primary care physician, cardiologist, or surgeon. Check your blood pressure daily about one hour after you have taken your morning medications. Call you primary care doctor, cardiologist, or cardiac surgeon if your systolic blood pressure (top number) is consistently over 140. Write down your daily weight and blood pressure and bring this information to your follow up appointment.     Bowel activity may be slow after surgery. If necessary, you may take an over the counter laxative such as Milk of Magnesia or Miralax. You may have stool softeners prescribed (docusate sodium, Senokot). We recommend using stool softeners while using narcotics for pain (oxycodone/percocet, hydrocodone/vicodin, hydromorphone/dilaudid).      Wean OFF of narcotics (oxycodone, dilaudid, hydrocodone) as soon as possible. You should continue taking acetaminophen as long as you have any surgical pain as the first choice for pain control and add narcotics as necessary for pain to be tolerable.        REGARDING PRESCRIPTION REFILLS.  If you need a refill on your pain medication contact us to discuss your pain and a possible one time refill.   All other medications will be adjusted, discontinued and re-filled by your primary care physician and/or your cardiologist as they were prior to your surgery. We have given you enough for one to three month with possibly one refill. You may have refills available to you for some of your medications through  the [facility]. If you would like your refills sent to a different a different pharmacy, please contact your preferred pharmacy and let them know that you have prescriptions at [facility] that you would like transferred.    POST-OPERATIVE CLINIC VISITS  CV Surgery Advance Care Practitioners on 06/02/2025 at 1:45 PM the Heart Clinic at [facility] in [place].    Primary Care Doctor, Sienna Higginbotham NP, on 06/03/2025 at 9:00 AM at [facility].  Cardiology, Thiago Knox NP, on 05/30/2025 at the Heart Center at [facility].  Cardiology, Dr. Hartley, on 08/01/2025 at 2:45 PM at the [facility] at [facility].  If you need to cancel or reschedule your cardiology appointments please call [phone].     SURGICAL QUESTIONS  Please call our nurse coordinators, Katharine, at [phone] with questions or concerns related to surgery. For other non-urgent questions and requests, our nurse coordinators can also be reached via email or fax.   Email: [email or web address] or [email or web address]   Fax: [phone]    On weekends or after hours, please call [phone] and ask the  to page the Cardiothoracic Surgeon on-call.      Thank you,    Your Cardiothoracic Surgery Team

## 2025-05-20 ENCOUNTER — DOCUMENTATION ONLY (OUTPATIENT)
Dept: GERIATRICS | Facility: CLINIC | Age: 85
End: 2025-05-20

## 2025-05-20 ENCOUNTER — TRANSITIONAL CARE UNIT VISIT (OUTPATIENT)
Dept: GERIATRICS | Facility: CLINIC | Age: 85
End: 2025-05-20
Payer: COMMERCIAL

## 2025-05-20 ENCOUNTER — TELEPHONE (OUTPATIENT)
Dept: FAMILY MEDICINE | Facility: CLINIC | Age: 85
End: 2025-05-20

## 2025-05-20 VITALS
BODY MASS INDEX: 28.02 KG/M2 | DIASTOLIC BLOOD PRESSURE: 88 MMHG | SYSTOLIC BLOOD PRESSURE: 149 MMHG | HEIGHT: 59 IN | OXYGEN SATURATION: 94 % | WEIGHT: 139 LBS | RESPIRATION RATE: 16 BRPM | HEART RATE: 86 BPM | TEMPERATURE: 98.3 F

## 2025-05-20 DIAGNOSIS — R53.81 PHYSICAL DECONDITIONING: ICD-10-CM

## 2025-05-20 DIAGNOSIS — N17.9 ACUTE KIDNEY INJURY SUPERIMPOSED ON CHRONIC KIDNEY DISEASE: ICD-10-CM

## 2025-05-20 DIAGNOSIS — I10 ESSENTIAL HYPERTENSION: ICD-10-CM

## 2025-05-20 DIAGNOSIS — I25.2 HISTORY OF NON-ST ELEVATION MYOCARDIAL INFARCTION (NSTEMI): Primary | ICD-10-CM

## 2025-05-20 DIAGNOSIS — I50.22 CHRONIC SYSTOLIC HEART FAILURE (H): ICD-10-CM

## 2025-05-20 DIAGNOSIS — R41.0 DELIRIUM: ICD-10-CM

## 2025-05-20 DIAGNOSIS — Z95.1 S/P CABG (CORONARY ARTERY BYPASS GRAFT): ICD-10-CM

## 2025-05-20 DIAGNOSIS — N30.00 ACUTE CYSTITIS WITHOUT HEMATURIA: ICD-10-CM

## 2025-05-20 DIAGNOSIS — N18.9 ACUTE KIDNEY INJURY SUPERIMPOSED ON CHRONIC KIDNEY DISEASE: ICD-10-CM

## 2025-05-20 DIAGNOSIS — I77.1 SUBCLAVIAN ARTERY STENOSIS, RIGHT: ICD-10-CM

## 2025-05-20 NOTE — TELEPHONE ENCOUNTER
Patient Quality Outreach    Patient is due for the following:   Physical Annual Wellness Visit    Action(s) Taken:   Patient has upcoming appointment, these items will be addressed at that time.    Type of outreach:    Chart review performed, no outreach needed.    Questions for provider review:    None         Mallory White, [facility]  Chart routed to None.

## 2025-05-20 NOTE — PROGRESS NOTES
"[facility] GERIATRICS    PRIMARY CARE PROVIDER AND CLINIC:  KOMAL Purvis CNP, [address] [address] [address]  Chief Complaint   Patient presents with    Hospital F/U      [facility] Medical Record Number:  [id number]  Place of Service where encounter took place:  [facility]  ([facility]) [[id number]]    HPI:  Mikayla Sotomayor  is a 84 year old  (1940), admitted to the above facility from  [facility]. Hospital stay 5/10/25 through 5/19/25. HPI information obtained from: facility chart records, facility staff, patient report and [facility] chart review. PMH: HTN, T2DM, and anxiety.  Presented to the ED with complaints of intermittent right arm pain for past 6 weeks as well as intermittent central chest pain for 2 days.  proBNP 1460.  Pulmonary edema on CT scan.  Given IV furosemide x 1.  Troponin 126, EKG with ST changes.  Given IV labetalol in ER.  Cardiology consulted; recommended treatment as NSTEMI.  IV heparin initiated.  Underwent coronary angiogram on 5/10; Severe right subclavian artery stenosis noted and had IABP placed; underwent CABG x3 on 5/11. Circulatory shock requiring Levophed and Norepi. Acute respiratory failure required ventilator briefly. Also treated with IV lasix x1 for hypervolemia. Transfused 1 unit PRBC 5/13.  Intermittent HTN; PTA enalapril increased. Delirum noted; started on seroquel. Navarro removed prior to discharge. New on atorvastatin.    Today:  Nursing staff reports no acute medical concerns.     Patient is seen today for a visit. She is finishing up with lunch. Feels \"good\"; \"no\" pain. She recalls working with therapy; notes walking.  Appetite is good \"as long as it's not something I don't like\". Last BM was \"yesterday\". \"No\" belly pain. \"No\" concerns with bladder.  Sleeping good; reports, \"they put me in bed and I go to sleep\". Mood is \"pretty good\"; \"no\" depression. Denies CP, palpitations, lightheadedness, dizziness, " fatigue, SOB, fever, chills, nausea/vomiting concerns today.      CODE STATUS/ADVANCE DIRECTIVES DISCUSSION:  Prior  CPR/Full code   ALLERGIES:   Allergies   Allergen Reactions    Propoxyphene Hcl      darvon-tylenol #3      PAST MEDICAL HISTORY:   Past Medical History:   Diagnosis Date    Essential hypertension, benign     Fam hx-cardiovas dis NEC     Family history of diabetes mellitus     Mitral valve disorders(424.0) 3/5/2014    Under care of  Dr Richelle Toledo MD, consultant in cardiology     Osteopenia     Premature atrial complexes 3/5/2014    Under care of  Dr Richelle Toledo MD, consultant in cardiology       PAST SURGICAL HISTORY:   has a past surgical history that includes C-SEC ONLY,PREV C-SEC (1965-68); APPENDECTOMY; drainage of hematoma/fluid (1980); ANESTH,SURG BREAST RECONSTRUCTIVE; test not found (March 03); COLONOSCOPY THRU STOMA, DIAGNOSTIC (1/2007); EYE EXAM ESTABLISHED PT (2012); DISCISSION,2ND CATARACT,INCIS (1/2008); Bypass graft artery coronary (N/A, 5/11/2025); Coronary Angiogram (N/A, 5/10/2025); and Intra aortic Balloon Pump Insertion (N/A, 5/10/2025).  FAMILY HISTORY: family history includes Diabetes in her brother and father; Gastrointestinal Disease in her mother; Heart Disease in her father; Hypertension in her mother and sister.  SOCIAL HISTORY:   reports that she has never smoked. She has never used smokeless tobacco. She reports current alcohol use. She reports that she does not use drugs.  Patient's living condition: lives with spouse in home    Post Discharge Medication Reconciliation Status:   MED REC REQUIRED  Post Medication Reconciliation Status: discharge medications reconciled, continue medications without change       Current Outpatient Medications   Medication Sig Dispense Refill    acetaminophen (TYLENOL) 325 MG tablet Take 2 tablets (650 mg) by mouth every 4 hours as needed for mild pain or headaches.      aspirin (ASA) 81 MG EC tablet Take 2 tablets (162 mg) by mouth  "daily.      Calcium-Magnesium-Vitamin D (CALCIUM MAGNESIUM PO) Take 1 tablet by mouth daily.      cephALEXin (KEFLEX) 500 MG capsule Take 1 capsule (500 mg) by mouth every 12 hours for 10 days.      Cyanocobalamin (B-12) 1000 MCG TBCR Take 1,000 mcg by mouth daily 100 tablet 1    enalapril (VASOTEC) 20 MG tablet Take 1 tablet (20 mg) by mouth 2 times daily. 180 tablet 3    furosemide (LASIX) 40 MG tablet Take 1 tablet (40 mg) by mouth daily for 7 days.      hydrALAZINE (APRESOLINE) 10 MG tablet Take 1 tablet (10 mg) by mouth 3 times daily. 90 tablet 1    hydrOXYzine HCl (ATARAX) 10 MG tablet Take 1 tablet (10 mg) by mouth 2 times daily as needed for itching or anxiety. 60 tablet 1    metoprolol succinate ER (TOPROL XL) 50 MG 24 hr tablet Take 1 tablet (50 mg) by mouth daily.      Multiple Vitamins-Minerals (MULTIVITAMIN & MINERAL) LIQD Take 2 oz by mouth daily.      QUEtiapine (SEROQUEL) 25 MG tablet Take 0.5 tablets (12.5 mg) by mouth at bedtime.      rosuvastatin (CRESTOR) 5 MG tablet Take 1 tablet (5 mg) by mouth daily.      senna-docusate (SENOKOT-S/PERICOLACE) 8.6-50 MG tablet Take 1-2 tablets by mouth 2 times daily as needed for constipation.      VITAMIN D, CHOLECALCIFEROL, PO Take 5 drops by mouth daily.       No current facility-administered medications for this visit.       ROS:  4 point ROS including Respiratory, CV, GI and , other than that noted in the HPI,  is negative    Vitals:  BP (!) 149/88   Pulse 86   Temp 98.3  F (36.8  C)   Resp 16   Ht 1.499 m (4' 11\")   Wt 63 kg (139 lb)   SpO2 94%   BMI 28.07 kg/m    Exam:  GENERAL APPEARANCE:  Alert, elderly, in no distress, sitting in wheelchair  HEENT:  atraumatic, [place], EOM intact, moist mucus membranes  RESP:  non-labored breathing, lungs clear on auscultation, no respiratory distress, no cough  CV:  Rate regular, S1 S2 noted, no edema  ABDOMEN:  soft, non-distended, non-tender, bowel sounds active  M/S:   wheelchair bound, moves all " extremities, strength and tone equal bilaterally, no calf pain, arthritic changes noted in hands  SKIN:  surgical incision on chest JUDAH; scabbed; surrounding skin non-erythematous  NEURO:   Face is symmetric, examination of sensation by touch normal, follows and tracks, slow speech, recall difficulty  PSYCH:  calm, cooperative      Lab/Diagnostic data:  Labs reviewed as per Epic and/or Care Everywhere.      ASSESSMENT/PLAN:    {FGS DX2:[id number]}    Orders:  {fgsorders:[id number]}  ***    *** minutes spent on the date of this encounter doing chart review, review labs, discussion with nursing staff, patient visit, and documentation.       Electronically signed by:  Dr. Karey Garcia, DNP, APRN, AGNP-BC, PHN    This note was completed with the assistance of dictation software. Typos and word substitution-errors are expected and unintended.

## 2025-05-20 NOTE — LETTER
5/20/2025      Mikayla BROWN [facility]  [address] [facility] [address]        No notes on file      Sincerely,        Karey Garcia, KOMAL CNP    Electronically signed   "is \"pretty good\"; \"no\" depression. Denies CP, palpitations, lightheadedness, dizziness, fatigue, SOB, fever, chills, nausea/vomiting concerns today.      CODE STATUS/ADVANCE DIRECTIVES DISCUSSION:  Prior  CPR/Full code   ALLERGIES:   Allergies   Allergen Reactions     Propoxyphene Hcl      darvon-tylenol #3      PAST MEDICAL HISTORY:   Past Medical History:   Diagnosis Date     Essential hypertension, benign      Fam hx-cardiovas dis NEC      Family history of diabetes mellitus      Mitral valve disorders(424.0) 3/5/2014    Under care of  Dr Richelle Toledo MD, consultant in cardiology      Osteopenia      Premature atrial complexes 3/5/2014    Under care of  Dr Richelle Toledo MD, consultant in cardiology       PAST SURGICAL HISTORY:   has a past surgical history that includes C-SEC ONLY,PREV C-SEC (1965-68); APPENDECTOMY; drainage of hematoma/fluid (1980); ANESTH,SURG BREAST RECONSTRUCTIVE; test not found (March 03); COLONOSCOPY THRU STOMA, DIAGNOSTIC (1/2007); EYE EXAM ESTABLISHED PT (2012); DISCISSION,2ND CATARACT,INCIS (1/2008); Bypass graft artery coronary (N/A, 5/11/2025); Coronary Angiogram (N/A, 5/10/2025); and Intra aortic Balloon Pump Insertion (N/A, 5/10/2025).  FAMILY HISTORY: family history includes Diabetes in her brother and father; Gastrointestinal Disease in her mother; Heart Disease in her father; Hypertension in her mother and sister.  SOCIAL HISTORY:   reports that she has never smoked. She has never used smokeless tobacco. She reports current alcohol use. She reports that she does not use drugs.  Patient's living condition: lives with spouse in home    Post Discharge Medication Reconciliation Status:   MED REC REQUIRED  Post Medication Reconciliation Status: discharge medications reconciled, continue medications without change       Current Outpatient Medications   Medication Sig Dispense Refill     acetaminophen (TYLENOL) 325 MG tablet Take 2 tablets (650 mg) by mouth every 4 hours as needed " "for mild pain or headaches.       aspirin (ASA) 81 MG EC tablet Take 2 tablets (162 mg) by mouth daily.       Calcium-Magnesium-Vitamin D (CALCIUM MAGNESIUM PO) Take 1 tablet by mouth daily.       cephALEXin (KEFLEX) 500 MG capsule Take 1 capsule (500 mg) by mouth every 12 hours for 10 days.       Cyanocobalamin (B-12) 1000 MCG TBCR Take 1,000 mcg by mouth daily 100 tablet 1     enalapril (VASOTEC) 20 MG tablet Take 1 tablet (20 mg) by mouth 2 times daily. 180 tablet 3     furosemide (LASIX) 40 MG tablet Take 1 tablet (40 mg) by mouth daily for 7 days.       hydrALAZINE (APRESOLINE) 10 MG tablet Take 1 tablet (10 mg) by mouth 3 times daily. 90 tablet 1     hydrOXYzine HCl (ATARAX) 10 MG tablet Take 1 tablet (10 mg) by mouth 2 times daily as needed for itching or anxiety. 60 tablet 1     metoprolol succinate ER (TOPROL XL) 50 MG 24 hr tablet Take 1 tablet (50 mg) by mouth daily.       Multiple Vitamins-Minerals (MULTIVITAMIN & MINERAL) LIQD Take 2 oz by mouth daily.       QUEtiapine (SEROQUEL) 25 MG tablet Take 0.5 tablets (12.5 mg) by mouth at bedtime.       rosuvastatin (CRESTOR) 5 MG tablet Take 1 tablet (5 mg) by mouth daily.       senna-docusate (SENOKOT-S/PERICOLACE) 8.6-50 MG tablet Take 1-2 tablets by mouth 2 times daily as needed for constipation.       VITAMIN D, CHOLECALCIFEROL, PO Take 5 drops by mouth daily.       No current facility-administered medications for this visit.       ROS:  4 point ROS including Respiratory, CV, GI and , other than that noted in the HPI,  is negative    Vitals:  BP (!) 149/88   Pulse 86   Temp 98.3  F (36.8  C)   Resp 16   Ht 1.499 m (4' 11\")   Wt 63 kg (139 lb)   SpO2 94%   BMI 28.07 kg/m    Exam:  GENERAL APPEARANCE:  Alert, elderly, in no distress, sitting in wheelchair  HEENT:  atraumatic, Platinum, EOM intact, moist mucus membranes  RESP:  non-labored breathing, lungs clear on auscultation, no respiratory distress, no cough  CV:  Rate regular, S1 S2 noted, no " edema  ABDOMEN:  soft, non-distended, non-tender, bowel sounds active  M/S:   wheelchair bound, moves all extremities, strength and tone equal bilaterally, no calf pain, arthritic changes noted in hands  SKIN:  surgical incision on chest JUDAH; scabbed; surrounding skin non-erythematous  NEURO:   Face is symmetric, examination of sensation by touch normal, follows and tracks, slow speech, recall difficulty  PSYCH:  calm, cooperative      Lab/Diagnostic data:  Labs reviewed as per Epic and/or Care Everywhere.      ASSESSMENT/PLAN:     History of non-ST elevation myocardial infarction (NSTEMI)  Subclavian artery stenosis, right  S/P CABG (coronary artery bypass graft)  Chronic systolic heart failure (H)  Essential hypertension  //88 mmHg. HR 86 bpm.  -continue metoprolol, hydralazine, and enalapril  -continue ASA and statin  -follow up with cardiology as recommended    Acute kidney injury superimposed on chronic kidney disease  Improved. Last Cr 1.39 (5/19/25).  -avoid nephrotoxins  -trend labs periodically    Acute cystitis without hematuria   Treated with Abx in hospital. Today, no concerns. VSS.  -follow clinically    Delirium   Noted during hospital stay; new on quetiapine and hydroxyzine. Today, no concerns reported.  -quetiapine at bedtime  -hydroxyzine as needed; discontinue if not-using  -monitor mood and behaviors    Physical deconditioning  Secondary to diagnoses above and recent hospitalization. In TCU for rehabilitation.   -PT/OT eval and treat - adv per recommendations   -SW available for safe discharge planning ongoing      45 minutes spent on the date of this encounter doing chart review, review labs, discussion with nursing staff, patient visit, and documentation.       Electronically signed by:  Dr. Karey Garcia, DNP, APRN, AGNP-BC, PHN    This note was completed with the assistance of dictation software. Typos and word substitution-errors are expected and  unintended.                    Sincerely,        Karey Garcia, APRN CNP    Electronically signed

## 2025-05-22 ENCOUNTER — TRANSITIONAL CARE UNIT VISIT (OUTPATIENT)
Dept: GERIATRICS | Facility: CLINIC | Age: 85
End: 2025-05-22
Payer: COMMERCIAL

## 2025-05-22 VITALS
DIASTOLIC BLOOD PRESSURE: 74 MMHG | RESPIRATION RATE: 16 BRPM | TEMPERATURE: 98.4 F | BODY MASS INDEX: 28.43 KG/M2 | SYSTOLIC BLOOD PRESSURE: 128 MMHG | WEIGHT: 141 LBS | HEART RATE: 84 BPM | HEIGHT: 59 IN | OXYGEN SATURATION: 93 %

## 2025-05-22 NOTE — LETTER
5/22/2025      Mikayla BROWN [facility]  [address] [facility] [address]        No notes on file      Sincerely,        KOMAL Busby CNP    Electronically signed

## 2025-05-22 NOTE — PROGRESS NOTES
"[facility] GERIATRICS    Chief Complaint   Patient presents with    RECHECK     HPI:  Mikayla Sotomayor is a 84 year old  (1940), who is being seen today for an episodic care visit at: [facility]  ([facility]) [[id number]]. Today's concern is: ***    Allergies, and PMH/PSH reviewed in [facility] today.  REVIEW OF SYSTEMS:  {zcfnxv14:771812}    Objective:   /74   Pulse 84   Temp 98.4  F (36.9  C)   Resp 16   Ht 1.499 m (4' 11\")   Wt 64 kg (141 lb)   SpO2 93%   BMI 28.48 kg/m    {Nursing home physical exam :369769}    {fgslab:440263}    Assessment/Plan:  {FGS DX2:940111}    MED REC REQUIRED{TIP  Click the link below to document or use med rec list, use list to pull in response :437266}  Post Medication Reconciliation Status: {MED REC LIST:621396}      Orders:  {fgsorders:599879}  ***    Electronically signed by: Natasha Powell CNA ***      "

## 2025-05-23 ENCOUNTER — TELEPHONE (OUTPATIENT)
Dept: CARDIOLOGY | Facility: CLINIC | Age: 85
End: 2025-05-23
Payer: COMMERCIAL

## 2025-05-23 ENCOUNTER — RESULTS FOLLOW-UP (OUTPATIENT)
Dept: GERIATRICS | Facility: CLINIC | Age: 85
End: 2025-05-23

## 2025-05-23 NOTE — TELEPHONE ENCOUNTER
Pt is scheduled for a [facility] appt on 5/30/25 with Thiago Knox.      Pt with a history of systolic heart failure..  Medication list reviewed and pt is not currently on Entresto, Jardiance, Farxiga, and Brenzaavy.    Please initiate coverage check for the above medications.  Please also include any additional grants and/or programs that would be available to the patient.    Thanks,  SUSANA Ennis([facility]) 5/23/25

## 2025-05-28 ENCOUNTER — DISCHARGE SUMMARY NURSING HOME (OUTPATIENT)
Dept: GERIATRICS | Facility: CLINIC | Age: 85
End: 2025-05-28
Payer: COMMERCIAL

## 2025-05-28 VITALS
RESPIRATION RATE: 17 BRPM | HEART RATE: 77 BPM | BODY MASS INDEX: 27.42 KG/M2 | OXYGEN SATURATION: 94 % | SYSTOLIC BLOOD PRESSURE: 135 MMHG | HEIGHT: 59 IN | WEIGHT: 136 LBS | TEMPERATURE: 97.8 F | DIASTOLIC BLOOD PRESSURE: 81 MMHG

## 2025-05-28 DIAGNOSIS — Z95.1 S/P CABG (CORONARY ARTERY BYPASS GRAFT): Primary | ICD-10-CM

## 2025-05-28 DIAGNOSIS — I10 ESSENTIAL HYPERTENSION: ICD-10-CM

## 2025-05-28 DIAGNOSIS — I50.22 CHRONIC SYSTOLIC HEART FAILURE (H): ICD-10-CM

## 2025-05-28 DIAGNOSIS — R60.0 BILATERAL LOWER EXTREMITY EDEMA: ICD-10-CM

## 2025-05-28 DIAGNOSIS — R41.89 COGNITIVE IMPAIRMENT: ICD-10-CM

## 2025-05-28 DIAGNOSIS — M62.81 GENERALIZED MUSCLE WEAKNESS: ICD-10-CM

## 2025-05-28 DIAGNOSIS — N18.32 STAGE 3B CHRONIC KIDNEY DISEASE (H): ICD-10-CM

## 2025-05-28 DIAGNOSIS — I25.2 HISTORY OF NON-ST ELEVATION MYOCARDIAL INFARCTION (NSTEMI): ICD-10-CM

## 2025-05-28 DIAGNOSIS — N30.00 ACUTE CYSTITIS WITHOUT HEMATURIA: ICD-10-CM

## 2025-05-28 PROBLEM — J81.0 PULMONARY EDEMA, ACUTE (H): Status: RESOLVED | Noted: 2025-05-09 | Resolved: 2025-05-28

## 2025-05-28 PROCEDURE — 99316 NF DSCHRG MGMT 30 MIN+: CPT

## 2025-05-28 NOTE — LETTER
5/28/2025      Mikayla Sotomayor  [address] [facility] [address]        [facility] GERIATRICS DISCHARGE SUMMARY  PATIENT'S NAME: Mikayla Sotomayor  YOB: 1940  MEDICAL RECORD NUMBER:  [id number]  Place of Service where encounter took place:  [facility]  ([facility]) [[id number]]    PRIMARY CARE PROVIDER AND CLINIC RESPONSIBLE AFTER TRANSFER:   KOMAL Purvis CNP, [address] [facility] [address]     Transferring providers: KOMAL Busby CNP, Madalyn Hartley MD  Recent Hospitalization/ED:  [facility] stay 5/10/25 to 5/19/25.  Date of SNF Admission: May 19, 2025  Date of SNF (anticipated) Discharge: May 30, 2025  Discharged to: previous independent home with   Cognitive Scores: see therapy notes  Physical Function: ambulates 200 ft with FWW contact guard assistnace  DME: No new DME needed    CODE STATUS/ADVANCE DIRECTIVES DISCUSSION:  Full Code   ALLERGIES: Propoxyphene hcl    NURSING FACILITY COURSE   NSTEMI  CABG x2 (LIMA to LAD and SVG to OM)  Generalized weakness  Chronic systolic heart failure  HTN  Lower extremity edema  Continue Toprol 50 mg daily, PTA hydralazine 10 mg TID, enalapril to 20 mg daily now that renal function back to baseline.   Aspirin 162 mg daily, continue rosuvastatin 5 mg daily and up titrate as able (new med for patient)   Follow up with [facility]  weight stable today- 136  PT/OT in TCU  PRN tylenol for pain control  Bps 130s/70-80s  +1/2 bilateral lower extremity edema- apply compression stockings during the day, elevate legs 6 inches above heart for 15 mins 3 times daily     UTI  Retention in the hospital requiring rand catheter placement  Complete course of Cephalexin 500 mg BID for 10 days  No urinary symptoms today     CKD stage 3b  Chronic, stable  Baseline creat 1.6  5/19/25 1.39; eGFR 37     Cognitive impairment  Was experiencing some anxiety agitation in the  "hospital and upon admission to TCU. Baseline memory issues  No concerns from nursing  Discontinue seroquel per family request     Current Outpatient Medications   Medication Sig Dispense Refill     acetaminophen (TYLENOL) 325 MG tablet Take 2 tablets (650 mg) by mouth every 4 hours as needed for mild pain or headaches.       aspirin (ASA) 81 MG EC tablet Take 2 tablets (162 mg) by mouth daily.       cephALEXin (KEFLEX) 500 MG capsule Take 1 capsule (500 mg) by mouth every 12 hours for 10 days.       Cyanocobalamin (B-12) 1000 MCG TBCR Take 1,000 mcg by mouth daily 100 tablet 1     enalapril (VASOTEC) 20 MG tablet Take 1 tablet (20 mg) by mouth 2 times daily. 180 tablet 3     hydrALAZINE (APRESOLINE) 10 MG tablet Take 1 tablet (10 mg) by mouth 3 times daily. 90 tablet 1     metoprolol succinate ER (TOPROL XL) 50 MG 24 hr tablet Take 1 tablet (50 mg) by mouth daily.       Multiple Vitamins-Minerals (MULTIVITAMIN & MINERAL) LIQD Take 2 oz by mouth daily.       rosuvastatin (CRESTOR) 5 MG tablet Take 1 tablet (5 mg) by mouth daily.       senna-docusate (SENOKOT-S/PERICOLACE) 8.6-50 MG tablet Take 1-2 tablets by mouth 2 times daily as needed for constipation.       VITAMIN D, CHOLECALCIFEROL, PO Take 5 drops by mouth daily.       No current facility-administered medications for this visit.      Controlled medications:   not applicable/none     Past Medical History:   Past Medical History:   Diagnosis Date     Essential hypertension, benign      Fam hx-cardiovas dis NEC      Family history of diabetes mellitus      Mitral valve disorders(424.0) 3/5/2014    Under care of  Dr Richelle Toledo MD, consultant in cardiology      Osteopenia      Premature atrial complexes 3/5/2014    Under care of  Dr Richelle Toledo MD, consultant in cardiology      Physical Exam:   Vital signs:/81   Pulse 77   Temp 97.8  F (36.6  C)   Resp 17   Ht 1.499 m (4' 11\")   Wt 61.7 kg (136 lb)   SpO2 94%   BMI 27.47 kg/m     GENERAL " APPEARANCE: Well developed, well nourished, in no acute distress.  LUNGS: Lung sounds CTA, no adventitious sounds, respiratory effort normal.  CARD: RRR, S1, S2, without murmurs, gallops, rubs, no JVD, peripheral pulses 2+ and symmetric  ABD: Soft, nondistended and nontender with normal bowel sounds.   MSK: Muscle strength and tone were equal bilaterally. Moves all extremities easily and intentionally.   EXTREMITIES:  +1/2 bilateral lower extremity edema  NEURO: Alert  with cognitive impairment  SKIN: Left leg incision well healing, midline chest incision well healing. Both without s/s of infection   PSYCH: memory and judgement impaired at baseline    SNF labs: Recent labs in [facility] reviewed by me today.     DISCHARGE PLAN:  Follow up labs: No labs orders/due  Medical Follow Up:      Follow up with primary care provider in 1 weeks              Follow up with cardiology as scheduled  [facility] scheduled appointments:  Appointments in Next Year      May 30, 2025 9:00 AM  LAB with [facility]  [facility] ([facility]) [phone]     May 30, 2025 10:00 AM  (Arrive by 9:55 AM)  ENROLLMENT CORE with Iggy Knox NP  [facility] ([facility]) [phone]     Jun 02, 2025 1:45 PM  Post Op with [facility] CARDIOTHORACIC [facility]GERY[place]  [facility] ([facility] ) [phone]     Jun 03, 2025 9:00 AM  (Arrive by 8:45 AM)  ED/Hospital Follow Up with KOMAL Purvis CNP  [facility] ([facility] ) [phone]     Jun 03, 2025 2:30 PM  (Arrive by 2:15 PM)  Cardiac Evaluation with [facility] CARDIAC REHAB 5  [facility] Cardiac and Pulmonary [facility] [place] ([facility]) [phone]     Aug 01, 2025 2:45 PM  (Arrive by 2:40 PM)  New Cardiology with Tyree Hartley MD  [facility]  [facility] ([facility]) [phone]           Discharge Services: Home Care:  Occupational Therapy, Physical Therapy, Registered Nurse, and Home Health Aide  Discharge Instructions Verbalized to Patient at Discharge:   Weigh yourself daily in the morning and keep a record. Call your primary clinic: a) if you are more short of breath, or b) if your weight changes more than 3 pounds in one day or more than 5 pounds in one week.   OK to shower but no bathing or soaking until approved by surgeon.   Incision may be kept open to air.   Notify your surgeon if you have increased redness, swelling, tenderness, or drainage at your incision site.   Notify pcp if you have a fever greater than 100.5 degrees.   Driving is not recommended due to cognitive impairment  24-hour supervision is recommended for safety.     TOTAL DISCHARGE TIME:   Greater than 30 minutes  Electronically signed by:  KOMAL Busby CNP on 5/28/2025 at 4:18 PM     Documentation of Face to Face and Certification for Home Health Services    I certify that patient: Mikayla Sotomayor is under my care and that I, or a nurse practitioner or physician's assistant working with me, had a face-to-face encounter that meets the physician face-to-face encounter requirements with this patient on: 5/28/2025.    This encounter with the patient was in whole, or in part, for the following medical condition, which is the primary reason for home health care:   NSTEMI  CABG x2 (LIMA to LAD and SVG to OM)  Generalized weakness  Chronic systolic heart failure  HTN  Lower extremity edema  UTI  CKD stage 3b  Cognitive impairment    I certify that, based on my findings, the following services are medically necessary home health services: Nursing, Occupational Therapy, Physical Therapy, and Home health aide.    My clinical findings support the need for the above services because: Nurse is needed: To provide assessment and  oversight required in the home to assure adherence to the medical plan due to: inability to manage medications independently safely.., Occupational Therapy Services are needed to assess and treat cognitive ability and address ADL safety due to impairment in ability to transfer independently safely., and Physical Therapy Services are needed to assess and treat the following functional impairments: ability to transfer safely independently.    Further, I certify that my clinical findings support that this patient is homebound (i.e. absences from home require considerable and taxing effort and are for medical reasons or [employer] services or infrequently or of short duration when for other reasons) because: Requires assistance of another person or specialized equipment to access medical services because patient: Is unable to walk greater than 250 feet without rest. and Requires supervision of another for safe transfer...    Based on the above findings. I certify that this patient is confined to the home and needs intermittent skilled nursing care, physical therapy and/or speech therapy.  The patient is under my care, and I have initiated the establishment of the plan of care.  This patient will be followed by a physician who will periodically review the plan of care.  Physician/Provider to provide follow up care: Paige Higginbotham    Attending hospital physician (the Medicare certified [facility] provider): KOMAL Busby CNP   Physician Signature: See electronic signature associated with these discharge orders.  Date: 5/28/2025              Sincerely,        KOMAL Busby CNP    Electronically signed

## 2025-05-28 NOTE — PROGRESS NOTES
[facility] GERIATRICS DISCHARGE SUMMARY  PATIENT'S NAME: Mikayla Sotomayor  YOB: 1940  MEDICAL RECORD NUMBER:  [id number]  Place of Service where encounter took place:  [facility]  ([facility]) [[id number]]    PRIMARY CARE PROVIDER AND CLINIC RESPONSIBLE AFTER TRANSFER:   KOMAL Purvis CNP, [address] [facility] [id number]     Transferring providers: KOMAL Busby CNP, Madalyn Hartley MD  Recent Hospitalization/ED:  [facility] stay 5/10/25 to 5/19/25.  Date of SNF Admission: May 19, 2025  Date of SNF (anticipated) Discharge: May 30, 2025  Discharged to: previous independent home with   Cognitive Scores: see therapy notes  Physical Function: ambulates 200 ft with FWW contact guard assistnace  DME: No new DME needed    CODE STATUS/ADVANCE DIRECTIVES DISCUSSION:  Full Code   ALLERGIES: Propoxyphene hcl    NURSING FACILITY COURSE   NSTEMI  CABG x2 (LIMA to LAD and SVG to OM)  Generalized weakness  Chronic systolic heart failure  HTN  Lower extremity edema  Continue Toprol 50 mg daily, PTA hydralazine 10 mg TID, enalapril to 20 mg daily now that renal function back to baseline.   Aspirin 162 mg daily, continue rosuvastatin 5 mg daily and up titrate as able (new med for patient)   Follow up with CORE clinic  weight stable today- 136  PT/OT in TCU  PRN tylenol for pain control  Bps 130s/70-80s  +1/2 bilateral lower extremity edema- apply compression stockings during the day, elevate legs 6 inches above heart for 15 mins 3 times daily     UTI  Retention in the hospital requiring rand catheter placement  Complete course of Cephalexin 500 mg BID for 10 days  No urinary symptoms today     CKD stage 3b  Chronic, stable  Baseline creat 1.6  5/19/25 1.39; eGFR 37     Cognitive impairment  Was experiencing some anxiety agitation in the hospital and upon admission to TCU. Baseline memory issues  No concerns from  "nursing  Discontinue seroquel per family request     Current Outpatient Medications   Medication Sig Dispense Refill    acetaminophen (TYLENOL) 325 MG tablet Take 2 tablets (650 mg) by mouth every 4 hours as needed for mild pain or headaches.      aspirin (ASA) 81 MG EC tablet Take 2 tablets (162 mg) by mouth daily.      cephALEXin (KEFLEX) 500 MG capsule Take 1 capsule (500 mg) by mouth every 12 hours for 10 days.      Cyanocobalamin (B-12) 1000 MCG TBCR Take 1,000 mcg by mouth daily 100 tablet 1    enalapril (VASOTEC) 20 MG tablet Take 1 tablet (20 mg) by mouth 2 times daily. 180 tablet 3    hydrALAZINE (APRESOLINE) 10 MG tablet Take 1 tablet (10 mg) by mouth 3 times daily. 90 tablet 1    metoprolol succinate ER (TOPROL XL) 50 MG 24 hr tablet Take 1 tablet (50 mg) by mouth daily.      Multiple Vitamins-Minerals (MULTIVITAMIN & MINERAL) LIQD Take 2 oz by mouth daily.      rosuvastatin (CRESTOR) 5 MG tablet Take 1 tablet (5 mg) by mouth daily.      senna-docusate (SENOKOT-S/PERICOLACE) 8.6-50 MG tablet Take 1-2 tablets by mouth 2 times daily as needed for constipation.      VITAMIN D, CHOLECALCIFEROL, PO Take 5 drops by mouth daily.       No current facility-administered medications for this visit.      Controlled medications:   not applicable/none     Past Medical History:   Past Medical History:   Diagnosis Date    Essential hypertension, benign     Fam hx-cardiovas dis NEC     Family history of diabetes mellitus     Mitral valve disorders(424.0) 3/5/2014    Under care of  Dr Richelle Toledo MD, consultant in cardiology     Osteopenia     Premature atrial complexes 3/5/2014    Under care of  Dr Richelle Toledo MD, consultant in cardiology      Physical Exam:   Vital signs:/81   Pulse 77   Temp 97.8  F (36.6  C)   Resp 17   Ht 1.499 m (4' 11\")   Wt 61.7 kg (136 lb)   SpO2 94%   BMI 27.47 kg/m     GENERAL APPEARANCE: Well developed, well nourished, in no acute distress.  LUNGS: Lung sounds CTA, no " adventitious sounds, respiratory effort normal.  CARD: RRR, S1, S2, without murmurs, gallops, rubs, no JVD, peripheral pulses 2+ and symmetric  ABD: Soft, nondistended and nontender with normal bowel sounds.   MSK: Muscle strength and tone were equal bilaterally. Moves all extremities easily and intentionally.   EXTREMITIES:  +1/2 bilateral lower extremity edema  NEURO: Alert  with cognitive impairment  SKIN: Left leg incision well healing, midline chest incision well healing. Both without s/s of infection   PSYCH: memory and judgement impaired at baseline    SNF labs: Recent labs in [facility] reviewed by me today.     DISCHARGE PLAN:  Follow up labs: No labs orders/due  Medical Follow Up:      Follow up with primary care provider in 1 weeks              Follow up with cardiology as scheduled  [facility] scheduled appointments:  Appointments in Next Year      May 30, 2025 9:00 AM  LAB with RU LAB  [facility] ([facility]) [phone]     May 30, 2025 10:00 AM  (Arrive by 9:55 AM)  ENROLLMENT CORE with Iggy Knox NP  [facility] ([facility]) [phone]     Jun 02, 2025 1:45 PM  Post Op with [facility] CARDIOTHORACIC SURGERY, [place]  [facility] ([facility] ) [phone]     Jun 03, 2025 9:00 AM  (Arrive by 8:45 AM)  ED/Hospital Follow Up with KOMAL Purvis CNP  [facility] ([facility] ) [phone]     Jun 03, 2025 2:30 PM  (Arrive by 2:15 PM)  Cardiac Evaluation with [facility] CARDIAC REHAB 5  [facility] [place] ([facility]) [phone]     Aug 01, 2025 2:45 PM  (Arrive by 2:40 PM)  New Cardiology with Tyree Hartley MD  [facility] ([facility]) [phone]            Discharge Services: Home Care:  Occupational Therapy, Physical Therapy, Registered Nurse, and Home Health Aide  Discharge Instructions Verbalized to Patient at Discharge:   Weigh yourself daily in the morning and keep a record. Call your primary clinic: a) if you are more short of breath, or b) if your weight changes more than 3 pounds in one day or more than 5 pounds in one week.   OK to shower but no bathing or soaking until approved by surgeon.   Incision may be kept open to air.   Notify your surgeon if you have increased redness, swelling, tenderness, or drainage at your incision site.   Notify pcp if you have a fever greater than 100.5 degrees.   Driving is not recommended due to cognitive impairment  24-hour supervision is recommended for safety.     TOTAL DISCHARGE TIME:   Greater than 30 minutes  Electronically signed by:  KOMAL Busyb CNP on 5/28/2025 at 4:18 PM     Documentation of Face to Face and Certification for Home Health Services    I certify that patient: Mikayla Sotomayor is under my care and that I, or a nurse practitioner or physician's assistant working with me, had a face-to-face encounter that meets the physician face-to-face encounter requirements with this patient on: 5/28/2025.    This encounter with the patient was in whole, or in part, for the following medical condition, which is the primary reason for home health care:   NSTEMI  CABG x2 (LIMA to LAD and SVG to OM)  Generalized weakness  Chronic systolic heart failure  HTN  Lower extremity edema  UTI  CKD stage 3b  Cognitive impairment    I certify that, based on my findings, the following services are medically necessary home health services: Nursing, Occupational Therapy, Physical Therapy, and Home health aide.    My clinical findings support the need for the above services because: Nurse is needed: To provide assessment and oversight required in the home to assure adherence to the medical plan due to: inability to  manage medications independently safely.., Occupational Therapy Services are needed to assess and treat cognitive ability and address ADL safety due to impairment in ability to transfer independently safely., and Physical Therapy Services are needed to assess and treat the following functional impairments: ability to transfer safely independently.    Further, I certify that my clinical findings support that this patient is homebound (i.e. absences from home require considerable and taxing effort and are for medical reasons or [employer] services or infrequently or of short duration when for other reasons) because: Requires assistance of another person or specialized equipment to access medical services because patient: Is unable to walk greater than 250 feet without rest. and Requires supervision of another for safe transfer...    Based on the above findings. I certify that this patient is confined to the home and needs intermittent skilled nursing care, physical therapy and/or speech therapy.  The patient is under my care, and I have initiated the establishment of the plan of care.  This patient will be followed by a physician who will periodically review the plan of care.  Physician/Provider to provide follow up care: Paige Higginbotham    Attending hospital physician (the Medicare certified [place] provider): KOMAL Busby CNP   Physician Signature: See electronic signature associated with these discharge orders.  Date: 5/28/2025

## 2025-05-30 ENCOUNTER — RESULTS FOLLOW-UP (OUTPATIENT)
Dept: GERIATRICS | Facility: CLINIC | Age: 85
End: 2025-05-30

## 2025-06-02 ENCOUNTER — OFFICE VISIT (OUTPATIENT)
Dept: CARDIOLOGY | Facility: CLINIC | Age: 85
End: 2025-06-02
Payer: COMMERCIAL

## 2025-06-02 ENCOUNTER — HOSPITAL ENCOUNTER (OUTPATIENT)
Dept: GENERAL RADIOLOGY | Facility: CLINIC | Age: 85
Discharge: HOME | End: 2025-06-02
Attending: PHYSICIAN ASSISTANT | Admitting: PHYSICIAN ASSISTANT
Payer: COMMERCIAL

## 2025-06-02 VITALS
HEIGHT: 59 IN | BODY MASS INDEX: 27.01 KG/M2 | SYSTOLIC BLOOD PRESSURE: 145 MMHG | WEIGHT: 134 LBS | DIASTOLIC BLOOD PRESSURE: 55 MMHG | HEART RATE: 73 BPM | OXYGEN SATURATION: 98 %

## 2025-06-02 DIAGNOSIS — Z95.1 S/P CABG (CORONARY ARTERY BYPASS GRAFT): ICD-10-CM

## 2025-06-02 DIAGNOSIS — Z95.1 S/P CABG (CORONARY ARTERY BYPASS GRAFT): Primary | ICD-10-CM

## 2025-06-02 PROCEDURE — 3078F DIAST BP <80 MM HG: CPT | Performed by: PHYSICIAN ASSISTANT

## 2025-06-02 PROCEDURE — 71046 X-RAY EXAM CHEST 2 VIEWS: CPT

## 2025-06-02 PROCEDURE — 3077F SYST BP >= 140 MM HG: CPT | Performed by: PHYSICIAN ASSISTANT

## 2025-06-02 PROCEDURE — 99024 POSTOP FOLLOW-UP VISIT: CPT | Performed by: PHYSICIAN ASSISTANT

## 2025-06-02 RX ORDER — HYDROCHLOROTHIAZIDE 12.5 MG/1
12.5 TABLET ORAL DAILY
COMMUNITY

## 2025-06-02 NOTE — PATIENT INSTRUCTIONS
Date of Surgery: 25     Drivin25     10 lbs weight restriction ends: 25 then slowly increase weight, if any popping or clicking stop    Follow up apt:  Dr. Roshan Hartley 25 at 2:45 pm    Keep legs elevated during the day, wear compression stocking as well  Take blood pressure 30-60 mins after morning pills. Goal blood pressure <140 mmHg  Resume taking hydrochlorothiazide (hydrochlorthiazide-water pill) daily in the am.

## 2025-06-02 NOTE — PROGRESS NOTES
CV Surgery Post Op Follow Up Note:     Update: CXR showing L pleural effusion, will schedule us thoracentesis    S:  From discharge summary: She tolerated the surgery well and was weaned off vasoactive infusions by POD1. She was initiated on beta blocker prior to discharge. Heart rhythm remained stable. She had some fluid overload treated with diuretic medication. At discharge she is above her pre-op weight, appears mildly hypervolemic, and sent with 7 days of PO Lasix. Instructed to trend daily weight and blood pressure until cardiac surgery follow up appointment. As above diagnosed with acute systolic heart failure this admission, [facility] cardiology follow-up arranged     She was extubated on POD1 and weaned off supplemental O2 following institution of pulmonary toilet and diuresis.      She has a history of diet-controlled DMT2 and had some transient hyperglycemia which was initially treated with an insulin infusion, then transitioned to sliding scale insulin, and has no insulin need at discharge.     She had a rand catheter placed intraoperatively which was removed on POD2. Noted to have intermittent urinary retention after follow removal, and rand was replaced on POD4. On POD7 she was noted to have increasing WBC. UA consistent with infection and she was started on antibiotics and Rand removed. No further retention noted following Rand removal, recommend PRN bladder scanning at TCU. Discharged with 5 day course of PO antibiotics for UTI.      Per family, she has some baseline memory issues. Postoperatively noted to have intermittent confusion consistent with delirium. She improved to baseline mentation by the time of discharge with the addition of low dose Seroquel at HS.      By the time of discharge of she had full return of bowel and bladder function, her pain was adequately controlled, she progressed with therapies but remained well below her functional baseline and is discharged to TCU on POD8 with the  help of her family.     Since being discharged from hospital, patient is recovering at tcu and now home.     Allergies:   Allergies   Allergen Reactions    Propoxyphene Hcl      darvon-tylenol #3       Medications:   Current Outpatient Medications:     acetaminophen (TYLENOL) 325 MG tablet, Take 2 tablets (650 mg) by mouth every 4 hours as needed for mild pain or headaches., Disp: , Rfl:     aspirin (ASA) 81 MG EC tablet, Take 2 tablets (162 mg) by mouth daily., Disp: , Rfl:     Cyanocobalamin (B-12) 1000 MCG TBCR, Take 1,000 mcg by mouth daily, Disp: 100 tablet, Rfl: 1    enalapril (VASOTEC) 20 MG tablet, Take 1 tablet (20 mg) by mouth 2 times daily., Disp: 180 tablet, Rfl: 3    hydrALAZINE (APRESOLINE) 10 MG tablet, Take 1 tablet (10 mg) by mouth 3 times daily., Disp: 90 tablet, Rfl: 1    metoprolol succinate ER (TOPROL XL) 50 MG 24 hr tablet, Take 1 tablet (50 mg) by mouth daily., Disp: , Rfl:     Multiple Vitamins-Minerals (MULTIVITAMIN & MINERAL) LIQD, Take 2 oz by mouth daily., Disp: , Rfl:     rosuvastatin (CRESTOR) 5 MG tablet, Take 1 tablet (5 mg) by mouth daily., Disp: , Rfl:     senna-docusate (SENOKOT-S/PERICOLACE) 8.6-50 MG tablet, Take 1-2 tablets by mouth 2 times daily as needed for constipation., Disp: , Rfl:     VITAMIN D, CHOLECALCIFEROL, PO, Take 5 drops by mouth daily., Disp: , Rfl:     Physical Exam:   Gen: NAD  CV: s1/s2, no m/r/g  Lungs: diminished L base, r is clear  Sternum: cdi  Extremities: 2-3+ LE edema, legs ace wrapped by myself     Assessment/Plan:     Mrs. Sotomayor is doing well in their post operative period.   SBP/HR at home: 130-140s/70s, HR: 60-70s  Resp: breathing, using IS couple times, only 500, aim to 1000  Incisions: healing, washing with antibacterial soap  Extremities/weight: 2-3+ lower extremity edema, compression stockings to be worn in the future.   Rehab plans: walking daily and will start outpatient home therapy.   Eating more now that she is home.   Follow up apts:  Dr. Roshan Hartley in August.      All of the patient's questions were answered. Our contact information was given to her if they should have any further questions/concerns. No further follow-up is needed with us.      Flower Baeza PA-C  CV Surgery  [facility]  Pager: [phone]

## 2025-06-03 ENCOUNTER — HOSPITAL ENCOUNTER (OUTPATIENT)
Dept: ULTRASOUND IMAGING | Facility: CLINIC | Age: 85
Discharge: HOME | End: 2025-06-03
Attending: PHYSICIAN ASSISTANT
Payer: COMMERCIAL

## 2025-06-03 ENCOUNTER — TELEPHONE (OUTPATIENT)
Dept: FAMILY MEDICINE | Facility: CLINIC | Age: 85
End: 2025-06-03

## 2025-06-03 DIAGNOSIS — Z09 HOSPITAL DISCHARGE FOLLOW-UP: ICD-10-CM

## 2025-06-03 DIAGNOSIS — Z95.1 S/P CABG (CORONARY ARTERY BYPASS GRAFT): ICD-10-CM

## 2025-06-03 PROCEDURE — 32555 ASPIRATE PLEURA W/ IMAGING: CPT

## 2025-06-03 PROCEDURE — 250N000009 HC RX 250: Performed by: RADIOLOGY

## 2025-06-03 RX ADMIN — LIDOCAINE HYDROCHLORIDE 10 ML: 10 INJECTION, SOLUTION EPIDURAL; INFILTRATION; INTRACAUDAL; PERINEURAL at 15:18

## 2025-06-03 NOTE — TELEPHONE ENCOUNTER
Home Care is calling regarding an established patient with [facility].  Requesting orders from: Paige Higginbotham RN APPROVED: RN able to provide verbal orders.  Home Care will send orders for signature.  RN will close encounter.  Is this a request for a temporary pause in the home care episode?  No    Orders Requested    Skilled Nursing  Request for initial certification (first set of orders)   Frequency: 1x/week x6 weeks  RN gave verbal order: Yes        Phone number Home Care can be reached at: MIGUEL Lozada with [facility] [phone]  Okay to leave a detailed message?: Yes      Frannie Navarro RN

## 2025-06-03 NOTE — PROGRESS NOTES
Patient tolerated left thoracentesis well.  450 mL of blood tinged isaias  fluid drained done by Dr. Caro. Dressing clean dry and intact with no drainage.  No albumin given. Patient states understanding discharge instructions, taking P.O and home per family.

## 2025-06-04 ENCOUNTER — TELEPHONE (OUTPATIENT)
Dept: FAMILY MEDICINE | Facility: CLINIC | Age: 85
End: 2025-06-04
Payer: COMMERCIAL

## 2025-06-04 NOTE — TELEPHONE ENCOUNTER
MTM referral from: Transitions of Care (recent hospital discharge, TCU discharge, or ED visit)    MTM referral outreach attempt #1 on June 4, 2025 at 11:30 AM      Outcome: Spoke with patient . Spoke with . Feels appt with MTM is not needed     Use bcbs part d MAP for the carrier/Plan on the flowsheet      Guera Amador CMA  MTM

## 2025-06-10 ENCOUNTER — HOSPITAL ENCOUNTER (OUTPATIENT)
Dept: CARDIAC REHAB | Facility: CLINIC | Age: 85
Discharge: HOME | End: 2025-06-10
Attending: STUDENT IN AN ORGANIZED HEALTH CARE EDUCATION/TRAINING PROGRAM
Payer: COMMERCIAL

## 2025-06-10 PROCEDURE — 93798 PHYS/QHP OP CAR RHAB W/ECG: CPT

## 2025-06-10 PROCEDURE — 93797 PHYS/QHP OP CAR RHAB WO ECG: CPT

## 2025-06-13 ENCOUNTER — HOSPITAL ENCOUNTER (OUTPATIENT)
Dept: CARDIAC REHAB | Facility: CLINIC | Age: 85
Discharge: HOME | End: 2025-06-13
Attending: STUDENT IN AN ORGANIZED HEALTH CARE EDUCATION/TRAINING PROGRAM
Payer: COMMERCIAL

## 2025-06-13 PROCEDURE — 93798 PHYS/QHP OP CAR RHAB W/ECG: CPT

## 2025-06-16 DIAGNOSIS — Z95.1 S/P CABG (CORONARY ARTERY BYPASS GRAFT): ICD-10-CM

## 2025-06-16 DIAGNOSIS — I10 ESSENTIAL HYPERTENSION, BENIGN: ICD-10-CM

## 2025-06-16 RX ORDER — HYDRALAZINE HYDROCHLORIDE 10 MG/1
10 TABLET, FILM COATED ORAL 3 TIMES DAILY
Qty: 270 TABLET | Refills: 0 | Status: SHIPPED | OUTPATIENT
Start: 2025-06-16 | End: 2025-06-18

## 2025-06-16 RX ORDER — ROSUVASTATIN CALCIUM 5 MG/1
5 TABLET, COATED ORAL DAILY
Qty: 90 TABLET | Refills: 0 | Status: SHIPPED | OUTPATIENT
Start: 2025-06-16 | End: 2025-06-18

## 2025-06-16 RX ORDER — METOPROLOL SUCCINATE 50 MG/1
50 TABLET, EXTENDED RELEASE ORAL DAILY
Qty: 90 TABLET | Refills: 0 | Status: SHIPPED | OUTPATIENT
Start: 2025-06-16 | End: 2025-06-18

## 2025-06-16 NOTE — TELEPHONE ENCOUNTER
"Routing to KOMAL Purvis CNP  - please review and send refills as appropriate. Pt has appt Wednesday 6/18.     Received call from pt's , Sincere, stating that they have been \"waiting all day for these prescriptions\" to go through.     Sincere reports that they are needing the hydralazine and metoprolol, however, he reports that he would \"rather she didn't get on a statin regimen right now\". RN had to ask multiple times his reasoning for this. He eventually stated \"because of her mental ability\" but did not elaborate further. He reports the last time she took the rosuvastatin was \"maybe a week ago, maybe a few days...\"      Would prefer she didn't take a statin drug at this time.     Just had bipass surgery - follow-up appt was cancelled due to KOMAL Purvis CNP  out of office, in the meantime they have run out of scripts.     Mallory RODRIGUEZ RN  [facility]    "

## 2025-06-16 NOTE — TELEPHONE ENCOUNTER
Please process refills asap-pt ran out of the below t-d up meds.  Recent CABG surgery.  Has an appointment with PCP on Wednesday.      Please call patient's  Sincere once taken care of.      Routing high priority at this time as they are out of the below medications.      Bre Grayson RN BSN  Clinic Nurse  [facility]

## 2025-06-17 ENCOUNTER — HOSPITAL ENCOUNTER (OUTPATIENT)
Dept: CARDIAC REHAB | Facility: CLINIC | Age: 85
Discharge: HOME | End: 2025-06-17
Attending: STUDENT IN AN ORGANIZED HEALTH CARE EDUCATION/TRAINING PROGRAM
Payer: COMMERCIAL

## 2025-06-17 PROCEDURE — 93798 PHYS/QHP OP CAR RHAB W/ECG: CPT

## 2025-06-17 NOTE — TELEPHONE ENCOUNTER
Sincere ([facility]) calling back about med refill.  He never received a call yesterday from the clinic saying they had been signed.  He is upset at this time.      Pt only received enalapril and hydrochlorothiazide this am so far.  Pt's  was instructed to go  the missing medication and give to patient ASAP.  She has Cardiac rehab this afternoon at 1:00pm-I advised them to still attend and they will assess her prior to her doing activity.      No further questions at this time.    Bre Grayson RN BSN  Clinic Nurse  [facility]

## 2025-06-18 ENCOUNTER — OFFICE VISIT (OUTPATIENT)
Dept: FAMILY MEDICINE | Facility: CLINIC | Age: 85
End: 2025-06-18
Payer: COMMERCIAL

## 2025-06-18 VITALS
SYSTOLIC BLOOD PRESSURE: 138 MMHG | WEIGHT: 131 LBS | TEMPERATURE: 97.2 F | HEIGHT: 59 IN | BODY MASS INDEX: 26.41 KG/M2 | RESPIRATION RATE: 19 BRPM | OXYGEN SATURATION: 97 % | DIASTOLIC BLOOD PRESSURE: 71 MMHG | HEART RATE: 73 BPM

## 2025-06-18 DIAGNOSIS — Z95.1 S/P CABG (CORONARY ARTERY BYPASS GRAFT): Primary | ICD-10-CM

## 2025-06-18 DIAGNOSIS — R00.0 RACING HEART BEAT: ICD-10-CM

## 2025-06-18 DIAGNOSIS — N18.32 STAGE 3B CHRONIC KIDNEY DISEASE (H): ICD-10-CM

## 2025-06-18 DIAGNOSIS — I10 ESSENTIAL HYPERTENSION, BENIGN: ICD-10-CM

## 2025-06-18 DIAGNOSIS — I21.4 NSTEMI (NON-ST ELEVATED MYOCARDIAL INFARCTION) (H): ICD-10-CM

## 2025-06-18 RX ORDER — ROSUVASTATIN CALCIUM 5 MG/1
5 TABLET, COATED ORAL DAILY
Qty: 90 TABLET | Refills: 1 | Status: SHIPPED | OUTPATIENT
Start: 2025-06-18

## 2025-06-18 RX ORDER — HYDROCHLOROTHIAZIDE 25 MG/1
12.5 TABLET ORAL DAILY
Qty: 90 TABLET | Refills: 1 | Status: SHIPPED | OUTPATIENT
Start: 2025-06-18

## 2025-06-18 RX ORDER — HYDRALAZINE HYDROCHLORIDE 10 MG/1
10 TABLET, FILM COATED ORAL 3 TIMES DAILY
Qty: 270 TABLET | Refills: 1 | Status: SHIPPED | OUTPATIENT
Start: 2025-06-18

## 2025-06-18 RX ORDER — METOPROLOL SUCCINATE 50 MG/1
50 TABLET, EXTENDED RELEASE ORAL DAILY
Qty: 90 TABLET | Refills: 1 | Status: SHIPPED | OUTPATIENT
Start: 2025-06-18

## 2025-06-18 RX ORDER — ASPIRIN 81 MG/1
162 TABLET, COATED ORAL DAILY
Qty: 180 TABLET | Refills: 3 | Status: SHIPPED | OUTPATIENT
Start: 2025-06-18

## 2025-06-18 NOTE — PROGRESS NOTES
Mikayla Sotomayor arrived here on 6/18/2025 11:52 AM for 3-7 Days  Zio monitor placement per ordering provider Paige Higginbotham for the diagnosis Racing Heart Beat R00.0.  Patient s skin was prepped per protocol. Zio monitor was placed.  Instructions were reviewed with and given to the patient.  Patient verbalized understanding of wear, troubleshooting and monitor return instructions.    Tia Swanson [facility]

## 2025-06-18 NOTE — PROGRESS NOTES
Assessment & Plan     (Z95.1) S/P CABG (coronary artery bypass graft)  (primary encounter diagnosis)  (I21.4) NSTEMI (non-ST elevated myocardial infarction) (H)  Comment: Doing well post-operatively with stable blood pressures at home; I reviewed the home blood pressure log. Hydrochlorothiazide was resumed on 6/2 to help with bilateral lower extremity edema. Started cardiac rehab. Patient and family very hesitant to continue statin due to potential side effects. Needs a high intensity statin which family would like to discuss with cardiology. Will continue rosuvastatin 5 mg daily for now. Has follow-up with cardiology scheduled for 8/1/2025. Refills provided.   Plan: aspirin (ASA) 81 MG EC tablet, metoprolol         succinate ER (TOPROL XL) 50 MG 24 hr tablet,         rosuvastatin (CRESTOR) 5 MG tablet    (R00.0) Racing heart beat  Comment: Patient reports that she has woken up a few times with a racing heart. Denies SOB, CP. EKG interpretation: Normal sinus rhythm, no significant ST changes, no axis deviation, improvement since last EKG on 5/11/2025. Zio patch ordered. Seek emergent care if patient develops warning signs/symptoms.   Plan: ZIO PATCH 3-7 DAYS (additional cost to         patient), ZIO PATCH 3-7 DAYS APPLICATION, EKG         12-lead complete w/read - Clinics    (I10) Essential hypertension, benign  Comment: Chronic stable medical condition. Currently taking enalapril 20 mg twice daily, hydrochlorothiazide 12.5 mg daily, Toprol XL 59 mg daily, and hydralazine 10 mg TID  Plan: hydrALAZINE (APRESOLINE) 10 MG tablet,         hydrochlorothiazide (HYDRODIURIL) 25 MG tablet    (N18.32) Stage 3b chronic kidney disease (H)  Comment: Chronic stable medical condition. Baseline creat is 1.6.         MED REC REQUIRED  Post Medication Reconciliation Status: discharge medications reconciled and changed, per note/orders  BMI  Estimated body mass index is 26.46 kg/m  as calculated from the following:    Height as of  "this encounter: 1.499 m (4' 11\").    Weight as of this encounter: 59.4 kg (131 lb).     The longitudinal plan of care for the diagnosis(es)/condition(s) as documented were addressed during this visit. Due to the added complexity in care, I will continue to support Mikayla in the subsequent management and with ongoing continuity of care.    Subjective   Mikayla is a 84 year old, presenting for the following health issues:  No chief complaint on file.    HPI        Hospital Follow-up Visit:  Per hospital records: \"Mikayla Sotomayor is a 84 year old female with a PMH of hypertension, dyslipidemia, DMT2, and CKD stage 3 who was admitted to [facility] 05/09 with one month history of right arm pain which progressed to resting chest pain and found to have NSTEMI. HS troponin peal 2844 and not trended further. TTE 05/10 with EF 35% with multiple WMA, mild MR, and mild TR. Transferred to [facility] 05/10 for urgent angiogram which demonstrated critical 99% LMCA stenosis, IABP placed, and CV surgery consulted for consideration of surgical revascularization. Underwent urgent CABG x3 (LIMA to LAD, SVG to OM2) on 05/11 with Dr. Guzman.\" She was discharged to TCU on 5/18 and subsequently discharged home from the TCU on 5/28. She did require a thoracentesis on 6/3 and reports feeling much better. She has had follow-up visit with CV surgery and cardiology.     She reported that she no longer needs her walker consistently, although she keeps it handy for safety, and has been using a cane as needed for extra stability. She mentioned that after receiving a thoracentesis, where 450 mL of fluid was removed, her breathing improved noticeably since her thoracentesis, although she still experiences episodes at night when her heart starts racing. These occurrences of a fast heartbeat, which she described as potentially an arrhythmia, have been most noticeable in the middle of the night.    She noted that since being discharged, there has been some confusion " with medications. Mikayla stated that she ran out of the discharge medications and had to rely on a duplicate prescription from a prior order. She recounted discussions regarding her medication regimen, including acetaminophen, aspirin, a statin at a 5 mg dose for cholesterol and heart protection, and hydrochlorothiazide (12.5 mg) which was started post-hospitalization at a clinic visit. Mikayla described frustrations with some pharmacy issues. Her daughter emphasized her concerns about the side effects of the statin based on previous experiences and memory of similar medications in her family.     Throughout her account, Mikayla conveyed overall satisfaction with her recovery progress, although she expressed understandable anxiety over medication changes and past episodes of heart rhythm irregularities. Her narrative reflects both her physical recovery steps and her vigilance regarding managing her heart condition and medication regimen.     Hospital/Nursing Home/IP Rehab Facility: [facility]  Most Recent Admission Date: 5/10/2025   Most Recent Admission Diagnosis: ST elevation MI (STEMI) (H) - I21.3  Status post coronary angiogram - Z98.890     Most Recent Discharge Date: 5/19/2025   Most Recent Discharge Diagnosis: ST elevation MI (STEMI) (H) - I21.3  NSTEMI (non-ST elevated myocardial infarction) (H) - I21.4  S/P CABG (coronary artery bypass graft) - Z95.1   Do you have any other stressors you would like to discuss with your provider? No    Problems taking medications regularly:  None  Medication changes since discharge: None  Problems adhering to non-medication therapy:  None    Summary of hospitalization:  [facility] discharge summary reviewed  Diagnostic Tests/Treatments reviewed.  Follow up needed: none  Other Healthcare Providers Involved in Patient s Care:         Specialist appointment - 6/17/2025  Update since discharge: improved.         Plan of care communicated with  patient and family               Objective    There were no vitals taken for this visit.  There is no height or weight on file to calculate BMI.  Physical Exam   GENERAL: alert and no distress  RESP: lungs clear to auscultation - no rales, rhonchi or wheezes  CV: regular rate and rhythm, normal S1 S2, no S3 or S4, no murmur, click or rub        Signed Electronically by: KOMAL Purvis CNP

## 2025-06-24 ENCOUNTER — HOSPITAL ENCOUNTER (OUTPATIENT)
Dept: CARDIAC REHAB | Facility: CLINIC | Age: 85
Discharge: HOME | End: 2025-06-24
Attending: STUDENT IN AN ORGANIZED HEALTH CARE EDUCATION/TRAINING PROGRAM
Payer: COMMERCIAL

## 2025-06-24 PROCEDURE — 93798 PHYS/QHP OP CAR RHAB W/ECG: CPT

## 2025-06-27 ENCOUNTER — HOSPITAL ENCOUNTER (OUTPATIENT)
Dept: CARDIAC REHAB | Facility: CLINIC | Age: 85
Discharge: HOME | End: 2025-06-27
Attending: STUDENT IN AN ORGANIZED HEALTH CARE EDUCATION/TRAINING PROGRAM
Payer: COMMERCIAL

## 2025-06-27 PROCEDURE — 93798 PHYS/QHP OP CAR RHAB W/ECG: CPT

## 2025-07-01 ENCOUNTER — HOSPITAL ENCOUNTER (OUTPATIENT)
Dept: CARDIAC REHAB | Facility: CLINIC | Age: 85
Discharge: HOME | End: 2025-07-01
Attending: STUDENT IN AN ORGANIZED HEALTH CARE EDUCATION/TRAINING PROGRAM
Payer: COMMERCIAL

## 2025-07-01 PROCEDURE — 93798 PHYS/QHP OP CAR RHAB W/ECG: CPT

## 2025-07-02 LAB — CV ZIO PRELIM RESULTS: NORMAL

## 2025-07-06 LAB — CV ZIO PRELIM RESULTS: NORMAL

## 2025-07-14 ENCOUNTER — TELEPHONE (OUTPATIENT)
Dept: CARDIOLOGY | Facility: CLINIC | Age: 85
End: 2025-07-14
Payer: COMMERCIAL

## 2025-07-14 DIAGNOSIS — I10 ESSENTIAL HYPERTENSION: ICD-10-CM

## 2025-07-14 DIAGNOSIS — I48.91 A-FIB (H): Primary | ICD-10-CM

## 2025-07-14 DIAGNOSIS — I25.5 ISCHEMIC CARDIOMYOPATHY: ICD-10-CM

## 2025-07-14 NOTE — TELEPHONE ENCOUNTER
I was notified that this patient had an episode of atrial fibrillation on a recent [employer] Patch monitor. [id number] Score is 6 (female, age++, HTN, DM, and CAD). Recommend follow up visit with me or another [facility] to discuss risks/benefits of starting on anticoagulation for stroke prevention given high [id number] Score. Thank you!

## 2025-07-15 ENCOUNTER — HOSPITAL ENCOUNTER (OUTPATIENT)
Dept: CARDIAC REHAB | Facility: CLINIC | Age: 85
Discharge: HOME | End: 2025-07-15
Attending: STUDENT IN AN ORGANIZED HEALTH CARE EDUCATION/TRAINING PROGRAM
Payer: COMMERCIAL

## 2025-07-15 PROCEDURE — 93798 PHYS/QHP OP CAR RHAB W/ECG: CPT

## 2025-07-15 NOTE — TELEPHONE ENCOUNTER
See message from Thiago Knox CNP regarding follow up visit. Placed orders for follow up and sent message to scheduling.     Lianna RIVERA  [facility]

## (undated) DEVICE — WIRE GUIDE 0.035"X260CM SAFE-T-J EXCHANGE G00517

## (undated) DEVICE — SU PROLENE 7-0 BV175-8 24" M8747

## (undated) DEVICE — POSITIONER ASSIST ESTECH 3S T401210S

## (undated) DEVICE — LINEN LEG ROLL 5489

## (undated) DEVICE — LINEN TOWEL PACK X30 5481

## (undated) DEVICE — SU PROLENE 7-0 BV-1DA 24" 8702H

## (undated) DEVICE — SU ETHIBOND 3-0 BBDA 36" X588H

## (undated) DEVICE — TUBING SUCTION MEDI-VAC SOFT 3/16"X20' N520A

## (undated) DEVICE — PUMP CP37 QUANTUM CENTRIFUGAL BLOOD CP37V-V0

## (undated) DEVICE — SU PLEDGET SOFT TFE 3/8"X3/26"X1/16" PCP40

## (undated) DEVICE — INTRO GLIDESHEATH SLENDER 6FR 10X45CM 60-1060

## (undated) DEVICE — PROTECTOR ARM ONE-STEP TRENDELENBURG 40418

## (undated) DEVICE — COVER TABLE POLY 65X90" 8186

## (undated) DEVICE — CANNULA PERFUSION ARTERIAL EOPA 18FR 12" 77418

## (undated) DEVICE — ADPT PERFUSION MULTIPLE

## (undated) DEVICE — LINEN TOWEL PACK X5 5464

## (undated) DEVICE — SU PROLENE 6-0 C-1DA 30" M8706

## (undated) DEVICE — SU PROLENE 4-0 RB-1DA 36" 8557H

## (undated) DEVICE — LINEN TOWEL PACK X6 WHITE 5487

## (undated) DEVICE — BAG DECANTER STERILE WHITE DYNJDEC09

## (undated) DEVICE — GOWN IMPERVIOUS SPECIALTY XL/XLONG 39049

## (undated) DEVICE — OXYGENATOR PERFUSION AFFINITY FUSION BB841

## (undated) DEVICE — SU VICRYL 2-0 CT-1 27" UND J259H

## (undated) DEVICE — DRAIN CHEST TUBE 32FR STR 8032

## (undated) DEVICE — GLOVE GAMMEX NEOPRENE ULTRA SZ 7.5 LF 8515

## (undated) DEVICE — SU PROLENE 8-0 BV130-5 24" M8732

## (undated) DEVICE — SOL NACL 0.9% IRRIG 1000ML BOTTLE 2F7124

## (undated) DEVICE — BLADE KNIFE BEAVER MINI STR BEAVER6900

## (undated) DEVICE — DEVICE ASSEMBLY SUCTION/ANTI COAG BTC93

## (undated) DEVICE — SU STEEL 6 CCS 4X18" M654G

## (undated) DEVICE — SUCTION CANISTER MEDIVAC LINER 3000ML W/LID 65651-530

## (undated) DEVICE — RX SURGIFLO HEMOSTATIC MATRIX W/THROMBIN 8ML 2994

## (undated) DEVICE — CATH ANGIO INFINITI JR4 4FRX100CM 538421

## (undated) DEVICE — SU ETHIBOND 2-0 SHDA 30" X563H

## (undated) DEVICE — RAD INTRODUCER KIT MICRO 5FRX10CM .018 NITINOL G/W

## (undated) DEVICE — SUCTION DRY CHEST DRAIN OASIS 3600-100

## (undated) DEVICE — KIT TURNOVER FAIRVIEW SOUTHDALE FULL SP3889

## (undated) DEVICE — MANIFOLD KIT ANGIO AUTOMATED 014613

## (undated) DEVICE — CATH DIAG 4FR JL 4.5 538417

## (undated) DEVICE — PACK SURGICAL PROCEDURE CUSTOM 3/8IN X 3/8IN BB11W21R

## (undated) DEVICE — PACK OPEN HEART PV12OH524

## (undated) DEVICE — LEAD PACER MYOCARDIAL BIPOLAR TEMPORARY 53CM 6495F

## (undated) DEVICE — CABLE MYO/LEAD PACING WHITE DISP 019-530

## (undated) DEVICE — SU MONOCRYL 4-0 PS-2 18" UND Y496G

## (undated) DEVICE — SOL WATER IRRIG 1000ML BOTTLE 2F7114

## (undated) DEVICE — SU SILK 1 TIE 10X30" SA87G

## (undated) DEVICE — CANISTER WOUND VAC W/GEL 500ML M8275063/5

## (undated) DEVICE — DRAPE NEW SLUSH/WARMER HUSHSLUSH 2.0 ESD340 ESD340

## (undated) DEVICE — TOTE ANGIO CORP PC15AT SAN32CC83O

## (undated) DEVICE — BLOWER/MISTER CLEARVIEW 22150

## (undated) DEVICE — SU PROLENE 4-0 SHDA 36" 8521H

## (undated) DEVICE — PUNCH AORTIC 4.0MMX8" RCB40

## (undated) DEVICE — DRSG ADAPTIC 3X8" 6113

## (undated) DEVICE — DEVICE TISSUE STABILIZATION OCTOBASE 28707

## (undated) DEVICE — KIT HAND CONTROL ANGIOTOUCH ACIST 65CM AT-P65

## (undated) DEVICE — KIT ENDO VASOVIEW HEMOPRO 2 VH-4000

## (undated) DEVICE — KIT WASH CELL SAVING ATL2001

## (undated) DEVICE — CANNULA PERFUSION 28/36FR 2 STG VNS 91228

## (undated) DEVICE — DRAIN CHEST TUBE RIGHT ANGLED 28FR 8128

## (undated) DEVICE — INTRO SHEATH 4FRX10CM PINNACLE RSS402

## (undated) DEVICE — Device

## (undated) DEVICE — PREP CHLORAPREP W/ORANGE TINT 10.5ML 930715

## (undated) DEVICE — CANNULA PERFUSION AORTIC ROOT 22FR 20012

## (undated) DEVICE — DEFIB PRO-PADZ LVP LQD GEL ADULT 8900-2105-01

## (undated) DEVICE — RESERVOIR CELL SAVING BLOOD COLLECTION EL2120

## (undated) DEVICE — SU ETHIBOND 0 CT-1 CR 8X18" CX21D

## (undated) DEVICE — DRSG WOUND VAC SPONGE MED BLACK M8275052/5

## (undated) DEVICE — CATH BALLOON IABP 7.5FRX40ML INTRA-AORTIC 0684-00-0568-01

## (undated) DEVICE — SOMASENSOR CEREBRAL OXIMETER ADULT SAFB-SM

## (undated) RX ORDER — HEPARIN SODIUM 1000 [USP'U]/ML
INJECTION, SOLUTION INTRAVENOUS; SUBCUTANEOUS
Status: DISPENSED
Start: 2025-05-11

## (undated) RX ORDER — DEXAMETHASONE SODIUM PHOSPHATE 4 MG/ML
INJECTION, SOLUTION INTRA-ARTICULAR; INTRALESIONAL; INTRAMUSCULAR; INTRAVENOUS; SOFT TISSUE
Status: DISPENSED
Start: 2025-05-11

## (undated) RX ORDER — SODIUM CHLORIDE, SODIUM GLUCONATE, SODIUM ACETATE, POTASSIUM CHLORIDE AND MAGNESIUM CHLORIDE 526; 502; 368; 37; 30 MG/100ML; MG/100ML; MG/100ML; MG/100ML; MG/100ML
INJECTION, SOLUTION INTRAVENOUS
Status: DISPENSED

## (undated) RX ORDER — CALCIUM CHLORIDE 100 MG/ML
INJECTION INTRAVENOUS; INTRAVENTRICULAR
Status: DISPENSED

## (undated) RX ORDER — ONDANSETRON 2 MG/ML
INJECTION INTRAMUSCULAR; INTRAVENOUS
Status: DISPENSED
Start: 2025-05-10

## (undated) RX ORDER — VECURONIUM BROMIDE 1 MG/ML
INJECTION, POWDER, LYOPHILIZED, FOR SOLUTION INTRAVENOUS
Status: DISPENSED
Start: 2025-05-11

## (undated) RX ORDER — FENTANYL CITRATE 50 UG/ML
INJECTION, SOLUTION INTRAMUSCULAR; INTRAVENOUS
Status: DISPENSED
Start: 2025-05-11

## (undated) RX ORDER — HYDROMORPHONE HYDROCHLORIDE 1 MG/ML
INJECTION, SOLUTION INTRAMUSCULAR; INTRAVENOUS; SUBCUTANEOUS
Status: DISPENSED
Start: 2025-05-11

## (undated) RX ORDER — HEPARIN SODIUM 200 [USP'U]/100ML
INJECTION, SOLUTION INTRAVENOUS
Status: DISPENSED
Start: 2025-05-10

## (undated) RX ORDER — PHENYLEPHRINE HCL IN 0.9% NACL 1 MG/10 ML
SYRINGE (ML) INTRAVENOUS
Status: DISPENSED

## (undated) RX ORDER — SODIUM CHLORIDE 9 MG/ML
INJECTION, SOLUTION INTRAVENOUS
Status: DISPENSED

## (undated) RX ORDER — HEPARIN SODIUM 1000 [USP'U]/ML
INJECTION, SOLUTION INTRAVENOUS; SUBCUTANEOUS
Status: DISPENSED

## (undated) RX ORDER — DEXMEDETOMIDINE HYDROCHLORIDE 4 UG/ML
INJECTION, SOLUTION INTRAVENOUS
Status: DISPENSED
Start: 2025-05-11

## (undated) RX ORDER — VERAPAMIL HYDROCHLORIDE 2.5 MG/ML
INJECTION INTRAVENOUS
Status: DISPENSED
Start: 2025-05-10

## (undated) RX ORDER — PAPAVERINE HYDROCHLORIDE 30 MG/ML
INJECTION INTRAMUSCULAR; INTRAVENOUS
Status: DISPENSED
Start: 2025-05-11

## (undated) RX ORDER — HEPARIN SODIUM 1000 [USP'U]/ML
INJECTION, SOLUTION INTRAVENOUS; SUBCUTANEOUS
Status: DISPENSED
Start: 2025-05-10

## (undated) RX ORDER — ASPIRIN 81 MG/1
TABLET, CHEWABLE ORAL
Status: DISPENSED
Start: 2025-05-10

## (undated) RX ORDER — PROTAMINE SULFATE 10 MG/ML
INJECTION, SOLUTION INTRAVENOUS
Status: DISPENSED
Start: 2025-05-11

## (undated) RX ORDER — SODIUM CHLORIDE, SODIUM GLUCONATE, SODIUM ACETATE, POTASSIUM CHLORIDE AND MAGNESIUM CHLORIDE 526; 502; 368; 37; 30 MG/100ML; MG/100ML; MG/100ML; MG/100ML; MG/100ML
INJECTION, SOLUTION INTRAVENOUS
Status: DISPENSED
Start: 2025-05-11

## (undated) RX ORDER — FENTANYL CITRATE 50 UG/ML
INJECTION, SOLUTION INTRAMUSCULAR; INTRAVENOUS
Status: DISPENSED
Start: 2025-05-10

## (undated) RX ORDER — NITROGLYCERIN 5 MG/ML
VIAL (ML) INTRAVENOUS
Status: DISPENSED
Start: 2025-05-10

## (undated) RX ORDER — CEFAZOLIN SODIUM 1 G/3ML
INJECTION, POWDER, FOR SOLUTION INTRAMUSCULAR; INTRAVENOUS
Status: DISPENSED
Start: 2025-05-11